# Patient Record
Sex: FEMALE | Race: WHITE | NOT HISPANIC OR LATINO | Employment: OTHER | URBAN - METROPOLITAN AREA
[De-identification: names, ages, dates, MRNs, and addresses within clinical notes are randomized per-mention and may not be internally consistent; named-entity substitution may affect disease eponyms.]

---

## 2017-03-06 ENCOUNTER — ALLSCRIPTS OFFICE VISIT (OUTPATIENT)
Dept: OTHER | Facility: OTHER | Age: 82
End: 2017-03-06

## 2017-03-06 DIAGNOSIS — E03.9 HYPOTHYROIDISM: ICD-10-CM

## 2017-03-06 DIAGNOSIS — R23.1 PALLOR: ICD-10-CM

## 2017-08-07 DIAGNOSIS — E03.9 HYPOTHYROIDISM: ICD-10-CM

## 2017-09-11 ENCOUNTER — ALLSCRIPTS OFFICE VISIT (OUTPATIENT)
Dept: OTHER | Facility: OTHER | Age: 82
End: 2017-09-11

## 2017-10-13 ENCOUNTER — APPOINTMENT (EMERGENCY)
Dept: CT IMAGING | Facility: HOSPITAL | Age: 82
DRG: 305 | End: 2017-10-13
Payer: MEDICARE

## 2017-10-13 ENCOUNTER — OFFICE VISIT (OUTPATIENT)
Dept: URGENT CARE | Facility: MEDICAL CENTER | Age: 82
End: 2017-10-13
Payer: MEDICARE

## 2017-10-13 ENCOUNTER — APPOINTMENT (EMERGENCY)
Dept: RADIOLOGY | Facility: HOSPITAL | Age: 82
DRG: 305 | End: 2017-10-13
Payer: MEDICARE

## 2017-10-13 ENCOUNTER — HOSPITAL ENCOUNTER (INPATIENT)
Facility: HOSPITAL | Age: 82
LOS: 7 days | Discharge: HOME | DRG: 305 | End: 2017-10-20
Attending: EMERGENCY MEDICINE | Admitting: FAMILY MEDICINE
Payer: MEDICARE

## 2017-10-13 DIAGNOSIS — I16.0 HYPERTENSIVE URGENCY: ICD-10-CM

## 2017-10-13 DIAGNOSIS — I48.91 ATRIAL FIBRILLATION (HCC): ICD-10-CM

## 2017-10-13 DIAGNOSIS — E03.9 HYPOTHYROIDISM: ICD-10-CM

## 2017-10-13 DIAGNOSIS — R55 SYNCOPE: ICD-10-CM

## 2017-10-13 DIAGNOSIS — R51.9 HEADACHE: ICD-10-CM

## 2017-10-13 DIAGNOSIS — R53.1 GENERALIZED WEAKNESS: Primary | ICD-10-CM

## 2017-10-13 LAB
ALBUMIN SERPL BCP-MCNC: 4 G/DL (ref 3.5–5)
ALP SERPL-CCNC: 75 U/L (ref 46–116)
ALT SERPL W P-5'-P-CCNC: 30 U/L (ref 12–78)
ANION GAP SERPL CALCULATED.3IONS-SCNC: 7 MMOL/L (ref 4–13)
APTT PPP: 26 SECONDS (ref 23–35)
AST SERPL W P-5'-P-CCNC: 23 U/L (ref 5–45)
BASOPHILS # BLD AUTO: 0.01 THOUSANDS/ΜL (ref 0–0.1)
BASOPHILS NFR BLD AUTO: 0 % (ref 0–1)
BILIRUB SERPL-MCNC: 0.44 MG/DL (ref 0.2–1)
BILIRUB UR QL STRIP: NEGATIVE
BUN SERPL-MCNC: 16 MG/DL (ref 5–25)
CALCIUM SERPL-MCNC: 9.8 MG/DL (ref 8.3–10.1)
CHLORIDE SERPL-SCNC: 99 MMOL/L (ref 100–108)
CLARITY UR: CLEAR
CO2 SERPL-SCNC: 31 MMOL/L (ref 21–32)
COLOR UR: YELLOW
CREAT SERPL-MCNC: 0.78 MG/DL (ref 0.6–1.3)
EOSINOPHIL # BLD AUTO: 0.05 THOUSAND/ΜL (ref 0–0.61)
EOSINOPHIL NFR BLD AUTO: 1 % (ref 0–6)
ERYTHROCYTE [DISTWIDTH] IN BLOOD BY AUTOMATED COUNT: 14 % (ref 11.6–15.1)
GFR SERPL CREATININE-BSD FRML MDRD: 69 ML/MIN/1.73SQ M
GLUCOSE SERPL-MCNC: 97 MG/DL (ref 65–140)
GLUCOSE UR STRIP-MCNC: NEGATIVE MG/DL
HCT VFR BLD AUTO: 40.1 % (ref 34.8–46.1)
HGB BLD-MCNC: 13.7 G/DL (ref 11.5–15.4)
HGB UR QL STRIP.AUTO: NEGATIVE
INR PPP: 0.87 (ref 0.86–1.16)
KETONES UR STRIP-MCNC: ABNORMAL MG/DL
LEUKOCYTE ESTERASE UR QL STRIP: NEGATIVE
LYMPHOCYTES # BLD AUTO: 1.36 THOUSANDS/ΜL (ref 0.6–4.47)
LYMPHOCYTES NFR BLD AUTO: 20 % (ref 14–44)
MCH RBC QN AUTO: 31.5 PG (ref 26.8–34.3)
MCHC RBC AUTO-ENTMCNC: 34.2 G/DL (ref 31.4–37.4)
MCV RBC AUTO: 92 FL (ref 82–98)
MONOCYTES # BLD AUTO: 0.57 THOUSAND/ΜL (ref 0.17–1.22)
MONOCYTES NFR BLD AUTO: 8 % (ref 4–12)
NEUTROPHILS # BLD AUTO: 4.81 THOUSANDS/ΜL (ref 1.85–7.62)
NEUTS SEG NFR BLD AUTO: 71 % (ref 43–75)
NITRITE UR QL STRIP: NEGATIVE
NRBC BLD AUTO-RTO: 0 /100 WBCS
PH UR STRIP.AUTO: 7 [PH] (ref 4.5–8)
PLATELET # BLD AUTO: 194 THOUSANDS/UL (ref 149–390)
PMV BLD AUTO: 9.7 FL (ref 8.9–12.7)
POTASSIUM SERPL-SCNC: 3.9 MMOL/L (ref 3.5–5.3)
PROT SERPL-MCNC: 7.7 G/DL (ref 6.4–8.2)
PROT UR STRIP-MCNC: NEGATIVE MG/DL
PROTHROMBIN TIME: 11.8 SECONDS (ref 12.1–14.4)
RBC # BLD AUTO: 4.35 MILLION/UL (ref 3.81–5.12)
SODIUM SERPL-SCNC: 137 MMOL/L (ref 136–145)
SP GR UR STRIP.AUTO: 1.01 (ref 1–1.03)
SPECIMEN SOURCE: NORMAL
TROPONIN I BLD-MCNC: 0 NG/ML (ref 0–0.08)
TSH SERPL DL<=0.05 MIU/L-ACNC: 8.07 UIU/ML (ref 0.36–3.74)
UROBILINOGEN UR QL STRIP.AUTO: 0.2 E.U./DL
WBC # BLD AUTO: 6.8 THOUSAND/UL (ref 4.31–10.16)

## 2017-10-13 PROCEDURE — 36415 COLL VENOUS BLD VENIPUNCTURE: CPT | Performed by: EMERGENCY MEDICINE

## 2017-10-13 PROCEDURE — 85610 PROTHROMBIN TIME: CPT | Performed by: EMERGENCY MEDICINE

## 2017-10-13 PROCEDURE — 70450 CT HEAD/BRAIN W/O DYE: CPT

## 2017-10-13 PROCEDURE — 71020 HB CHEST X-RAY 2VW FRONTAL&LATL: CPT

## 2017-10-13 PROCEDURE — 96361 HYDRATE IV INFUSION ADD-ON: CPT

## 2017-10-13 PROCEDURE — 84484 ASSAY OF TROPONIN QUANT: CPT

## 2017-10-13 PROCEDURE — 84443 ASSAY THYROID STIM HORMONE: CPT | Performed by: EMERGENCY MEDICINE

## 2017-10-13 PROCEDURE — G0463 HOSPITAL OUTPT CLINIC VISIT: HCPCS

## 2017-10-13 PROCEDURE — 99202 OFFICE O/P NEW SF 15 MIN: CPT

## 2017-10-13 PROCEDURE — 80053 COMPREHEN METABOLIC PANEL: CPT | Performed by: EMERGENCY MEDICINE

## 2017-10-13 PROCEDURE — 99285 EMERGENCY DEPT VISIT HI MDM: CPT

## 2017-10-13 PROCEDURE — 85025 COMPLETE CBC W/AUTO DIFF WBC: CPT | Performed by: EMERGENCY MEDICINE

## 2017-10-13 PROCEDURE — 96374 THER/PROPH/DIAG INJ IV PUSH: CPT

## 2017-10-13 PROCEDURE — 81003 URINALYSIS AUTO W/O SCOPE: CPT

## 2017-10-13 PROCEDURE — 81002 URINALYSIS NONAUTO W/O SCOPE: CPT | Performed by: EMERGENCY MEDICINE

## 2017-10-13 PROCEDURE — 85730 THROMBOPLASTIN TIME PARTIAL: CPT | Performed by: EMERGENCY MEDICINE

## 2017-10-13 PROCEDURE — 93005 ELECTROCARDIOGRAM TRACING: CPT

## 2017-10-13 RX ORDER — ALPRAZOLAM 0.25 MG/1
0.25 TABLET ORAL ONCE
Status: DISCONTINUED | OUTPATIENT
Start: 2017-10-13 | End: 2017-10-13

## 2017-10-13 RX ORDER — OLMESARTAN MEDOXOMIL 40 MG/1
40 TABLET ORAL DAILY
COMMUNITY
End: 2017-10-20 | Stop reason: HOSPADM

## 2017-10-13 RX ORDER — ALPRAZOLAM 0.25 MG/1
0.12 TABLET ORAL ONCE
Status: COMPLETED | OUTPATIENT
Start: 2017-10-13 | End: 2017-10-13

## 2017-10-13 RX ORDER — SERTRALINE HYDROCHLORIDE 25 MG/1
25 TABLET, FILM COATED ORAL DAILY
COMMUNITY
End: 2018-06-20 | Stop reason: SDUPTHER

## 2017-10-13 RX ORDER — LEVOTHYROXINE SODIUM 0.03 MG/1
25 TABLET ORAL DAILY
COMMUNITY
End: 2017-10-20 | Stop reason: HOSPADM

## 2017-10-13 RX ORDER — LABETALOL HYDROCHLORIDE 5 MG/ML
10 INJECTION, SOLUTION INTRAVENOUS ONCE
Status: COMPLETED | OUTPATIENT
Start: 2017-10-13 | End: 2017-10-13

## 2017-10-13 RX ORDER — AMLODIPINE BESYLATE AND BENAZEPRIL HYDROCHLORIDE 5; 20 MG/1; MG/1
1 CAPSULE ORAL DAILY
COMMUNITY
End: 2017-10-20 | Stop reason: HOSPADM

## 2017-10-13 RX ORDER — ALPRAZOLAM 0.25 MG/1
0.25 TABLET ORAL AS NEEDED
COMMUNITY
End: 2018-05-17 | Stop reason: ALTCHOICE

## 2017-10-13 RX ADMIN — LABETALOL 20 MG/4 ML (5 MG/ML) INTRAVENOUS SYRINGE 10 MG: at 19:11

## 2017-10-13 RX ADMIN — ALPRAZOLAM 0.12 MG: 0.25 TABLET ORAL at 21:58

## 2017-10-13 RX ADMIN — LABETALOL 20 MG/4 ML (5 MG/ML) INTRAVENOUS SYRINGE 10 MG: at 18:43

## 2017-10-13 RX ADMIN — SODIUM CHLORIDE 1000 ML: 0.9 INJECTION, SOLUTION INTRAVENOUS at 16:55

## 2017-10-13 NOTE — ED NOTES
Patient transported to [address] [place] [address] [facility]  10/13/17 7375

## 2017-10-13 NOTE — ED NOTES
Physician notified of patient BP, Orders received        [address], RN  10/13/17 5996

## 2017-10-13 NOTE — ED PROVIDER NOTES
History  Chief Complaint   Patient presents with    Weakness - Generalized     Pt reports generalized weakness, fatigue and headache off and on since wednesday  pt reports had a syncopal episode wednesday but denies hitting head or LOC    Headache     Pt  Had a syncopal episode 2 days ago, witnessed by her   +LOC for few seconds  Pt  Lives in independent living -walks with a walker  Then yesterday pt  Called the nurse at the facility that she lives at and was checked out , her vitals were fine   +generalized weakness since 2 days ago  +headaches (pressure), comes and goes for 2 days  No fevers, pt  Has some post nasal drip, no cough, no n/v/d  Pt  Is eating and drinking okay the past 2 days  Pt  Was at urgent care and then sent here  Her bp was normal at urgent care but elevated to 221/84 here now in the ER  She is taking her bp meds regularly  Doesn't take blood thinners  Prior to Admission Medications   Prescriptions Last Dose Informant Patient Reported? Taking? ALPRAZolam (XANAX) 0 25 mg tablet   Yes Yes   Sig: Take 0 25 mg by mouth as needed for anxiety   amLODIPine-benazepril (LOTREL 5-20) 5-20 MG per capsule   Yes Yes   Sig: Take 1 capsule by mouth daily   levothyroxine 25 mcg tablet   Yes Yes   Sig: Take 25 mcg by mouth daily   olmesartan (BENICAR) 40 mg tablet   Yes Yes   Sig: Take 40 mg by mouth daily   sertraline (ZOLOFT) 25 mg tablet   Yes Yes   Sig: Take 25 mg by mouth daily      Facility-Administered Medications: None       Past Medical History:   Diagnosis Date    Hypertension        History reviewed  No pertinent surgical history  History reviewed  No pertinent family history  I have reviewed and agree with the history as documented  Social History   Substance Use Topics    Smoking status: Former Smoker    Smokeless tobacco: Never Used    Alcohol use Yes        Review of Systems   Constitutional: Negative for appetite change, fatigue and fever     HENT: Negative for rhinorrhea and sore throat  Respiratory: Negative for cough, shortness of breath and wheezing  Cardiovascular: Negative for chest pain and leg swelling  Gastrointestinal: Negative for abdominal pain, diarrhea, nausea and vomiting  Genitourinary: Negative for dysuria and flank pain  Musculoskeletal: Negative for back pain and neck pain  Skin: Negative for rash  Neurological: Positive for dizziness, syncope, weakness, light-headedness and headaches  Psychiatric/Behavioral:        Mood normal       Physical Exam  ED Triage Vitals [10/13/17 1615]   Temperature Pulse Respirations Blood Pressure SpO2   97 8 °F (36 6 °C) 80 18 (!) 221/84 100 %      Temp Source Heart Rate Source Patient Position - Orthostatic VS BP Location FiO2 (%)   Oral Monitor Lying Right arm --      Pain Score       5           Physical Exam   Constitutional: She is oriented to person, place, and time  She appears well-developed and well-nourished  HENT:   Head: Normocephalic and atraumatic  Mouth/Throat: Oropharynx is clear and moist    Eyes: Pupils are equal, round, and reactive to light  Neck: Normal range of motion  Neck supple  Cardiovascular: Normal rate and regular rhythm  Pulmonary/Chest: Effort normal and breath sounds normal    Abdominal: Soft  There is no tenderness  Musculoskeletal: Normal range of motion  All ext  FROM, nontender   Neurological: She is alert and oriented to person, place, and time  Skin: Skin is warm and dry  Nursing note and vitals reviewed        ED Medications  Medications   sodium chloride 0 9 % bolus 1,000 mL (0 mL Intravenous Stopped 10/13/17 1831)   labetalol (NORMODYNE) injection 10 mg (10 mg Intravenous Given 10/13/17 1843)   labetalol (NORMODYNE) injection 10 mg (10 mg Intravenous Given 10/13/17 1911)   ALPRAZolam (XANAX) tablet 0 125 mg (0 125 mg Oral Given 10/13/17 2158)       Diagnostic Studies  Labs Reviewed   PROTIME-INR - Abnormal        Result Value Ref Range Status    Protime 11 8 (*) 12 1 - 14 4 seconds Final    INR 0 87  0 86 - 1 16 Final   COMPREHENSIVE METABOLIC PANEL - Abnormal     Chloride 99 (*) 100 - 108 mmol/L Final    Sodium 137  136 - 145 mmol/L Final    Potassium 3 9  3 5 - 5 3 mmol/L Final    CO2 31  21 - 32 mmol/L Final    Anion Gap 7  4 - 13 mmol/L Final    BUN 16  5 - 25 mg/dL Final    Creatinine 0 78  0 60 - 1 30 mg/dL Final    Comment: Standardized to IDMS reference method    Glucose 97  65 - 140 mg/dL Final    Comment:   If the patient is fasting, the ADA then defines impaired fasting glucose as > 100 mg/dL and diabetes as > or equal to 123 mg/dL  Specimen collection should occur prior to Sulfasalazine administration due to the potential for falsely depressed results  Specimen collection should occur prior to Sulfapyridine administration due to the potential for falsely elevated results  Calcium 9 8  8 3 - 10 1 mg/dL Final    AST 23  5 - 45 U/L Final    Comment:   Specimen collection should occur prior to Sulfasalazine administration due to the potential for falsely depressed results  ALT 30  12 - 78 U/L Final    Comment:   Specimen collection should occur prior to Sulfasalazine administration due to the potential for falsely depressed results  Alkaline Phosphatase 75  46 - 116 U/L Final    Total Protein 7 7  6 4 - 8 2 g/dL Final    Albumin 4 0  3 5 - 5 0 g/dL Final    Total Bilirubin 0 44  0 20 - 1 00 mg/dL Final    eGFR 69  ml/min/1 73sq m Final    Narrative:     National Kidney Disease Education Program recommendations are as follows:  GFR calculation is accurate only with a steady state creatinine  Chronic Kidney disease less than 60 ml/min/1 73 sq  meters  Kidney failure less than 15 ml/min/1 73 sq  meters     TSH, 3RD GENERATION - Abnormal     [facility] (*) 0 358 - 3 740 uIU/mL Final    Narrative:     Patients undergoing fluorescein dye angiography may retain small amounts of fluorescein in the body for 48-72 hours post procedure  Samples containing fluorescein can produce falsely depressed TSH values  If the patient had this procedure,a specimen should be resubmitted post fluorescein clearance            The recommended reference ranges for TSH during pregnancy are as follows:  First trimester 0 1 to 2 5 uIU/mL  Second trimester  0 2 to 3 0 uIU/mL  Third trimester 0 3 to 3 0 uIU/m     POCT URINALYSIS DIPSTICK - Abnormal    ED URINE MACROSCOPIC - Abnormal     Ketones, UA Trace (*) Negative mg/dl Final    Color, UA Yellow   Final    Clarity, UA Clear   Final    pH, UA 7 0  4 5 - 8 0 Final    Leukocytes, UA Negative  Negative Final    Nitrite, UA Negative  Negative Final    Protein, UA Negative  Negative mg/dl Final    Glucose, UA Negative  Negative mg/dl Final    Urobilinogen, UA 0 2  0 2, 1 0 E U /dl E U /dl Final    Bilirubin, UA Negative  Negative Final    Blood, UA Negative  Negative Final    Specific [place], UA 1 015  1 003 - 1 030 Final    Narrative:     CLINITEK RESULT   CBC AND DIFFERENTIAL - Normal    WBC 6 80  4 31 - 10 16 Thousand/uL Final    RBC 4 35  3 81 - 5 12 Million/uL Final    Hemoglobin 13 7  11 5 - 15 4 g/dL Final    Hematocrit 40 1  34 8 - 46 1 % Final    MCV 92  82 - 98 fL Final    MCH 31 5  26 8 - 34 3 pg Final    MCHC 34 2  31 4 - 37 4 g/dL Final    RDW 14 0  11 6 - 15 1 % Final    MPV 9 7  8 9 - 12 7 fL Final    Platelets 517  598 - 390 Thousands/uL Final    nRBC 0  /100 WBCs Final    Neutrophils Relative 71  43 - 75 % Final    Lymphocytes Relative 20  14 - 44 % Final    Monocytes Relative 8  4 - 12 % Final    Eosinophils Relative 1  0 - 6 % Final    Basophils Relative 0  0 - 1 % Final    Neutrophils Absolute 4 81  1 85 - 7 62 Thousands/µL Final    Lymphocytes Absolute 1 36  0 60 - 4 47 Thousands/µL Final    Monocytes Absolute 0 57  0 17 - 1 22 Thousand/µL Final    Eosinophils Absolute 0 05  0 00 - 0 61 Thousand/µL Final    Basophils Absolute 0 01  0 00 - 0 10 Thousands/µL Final   APTT - Normal    PTT 26  23 - 35 seconds Final    Narrative: Therapeutic Heparin Range = 60-90 seconds   POCT TROPONIN - Normal    POC Troponin I 0 00  0 00 - 0 08 ng/ml Final    Specimen Type VENOUS   Final    Narrative:     [employer] i-Stat handheld analyzer 99% cutoff is > 0 08ng/mL in [facility] Emergency Departments    o cTnI 99% cutoff is useful only when applied to patients in the clinical setting of myocardial ischemia  o cTnI 99% cutoff should be interpreted in the context of clinical history, ECG findings and possibly cardiac imaging to establish correct diagnosis  o cTnI 99% cutoff may be suggestive but clearly not indicative of a coronary event without the clinical setting of myocardial ischemia  CT head without contrast   Final Result   1  No acute intracranial abnormality  2  Hyperdensity in the right basal ganglia representing a focus of calcification  Workstation performed: [id number]         XR chest 2 views   Final Result      No active pulmonary disease  Workstation performed: [id number]         CT head without contrast   Final Result   1  Tiny 1 mm hyperdensity identified in the right basal ganglia likely represents focus of calcification and not acute hemorrhage  I have no priors for comparison  Follow-up noncontrast head CT is recommended in 3-4 hours to assure stability  No other    areas of concern for acute intracranial abnormality  2  Volume loss/atrophy and white matter microangiopathy           Workstation performed: [id number]             Procedures  ECG 12 Lead Documentation  Date/Time: 10/13/2017 4:29 PM  Performed by: DOMENICA Whelan  Authorized by: DOMENICA Whelan     Rate:     ECG rate:  68    ECG rate assessment: normal    Rhythm:     Rhythm: sinus rhythm    Comments:      No st elevation or depression          Phone Contacts  ED Phone Contact    ED Course  ED Course                                MDM  Number of Diagnoses or Management Options  Generalized weakness:   Headache:   Hypertensive urgency:   Syncope:      Amount and/or Complexity of Data Reviewed  Clinical lab tests: ordered and reviewed  Tests in the radiology section of CPT®: ordered and reviewed    Risk of Complications, Morbidity, and/or Mortality  Presenting problems: moderate  General comments: Pt  Admitted for further work up      Adilene Lund Time    Disposition  Final diagnoses:   Generalized weakness   Syncope   Headache   Hypertensive urgency     ED Disposition     ED Disposition Condition Comment    Admit  Case was discussed with CHRISSY  and the patient's admission status was agreed to be inpt /tele      Follow-up Information    None       Current Discharge Medication List      CONTINUE these medications which have NOT CHANGED    Details   ALPRAZolam (XANAX) 0 25 mg tablet Take 0 25 mg by mouth as needed for anxiety      amLODIPine-benazepril (LOTREL 5-20) 5-20 MG per capsule Take 1 capsule by mouth daily      levothyroxine 25 mcg tablet Take 25 mcg by mouth daily      olmesartan (BENICAR) 40 mg tablet Take 40 mg by mouth daily      sertraline (ZOLOFT) 25 mg tablet Take 25 mg by mouth daily           No discharge procedures on file      ED Provider  Electronically Signed by       Collins Gannon MD  10/14/17 0674

## 2017-10-13 NOTE — ED NOTES
Patient ambulatory to restroom with cane  Steady coordinated gait          [address], RN  10/13/17 1491

## 2017-10-14 PROBLEM — I16.0 HYPERTENSIVE URGENCY: Status: ACTIVE | Noted: 2017-10-14

## 2017-10-14 PROBLEM — R53.1 WEAKNESS: Status: ACTIVE | Noted: 2017-10-14

## 2017-10-14 PROBLEM — E78.5 HLD (HYPERLIPIDEMIA): Status: ACTIVE | Noted: 2017-10-14

## 2017-10-14 PROBLEM — E03.9 HYPOTHYROIDISM: Status: ACTIVE | Noted: 2017-10-14

## 2017-10-14 PROBLEM — R51.9 HEADACHE: Status: ACTIVE | Noted: 2017-10-14

## 2017-10-14 PROBLEM — R53.1 GENERALIZED WEAKNESS: Status: ACTIVE | Noted: 2017-10-14

## 2017-10-14 PROBLEM — R55 SYNCOPE: Status: ACTIVE | Noted: 2017-10-14

## 2017-10-14 LAB
ANION GAP SERPL CALCULATED.3IONS-SCNC: 7 MMOL/L (ref 4–13)
BUN SERPL-MCNC: 11 MG/DL (ref 5–25)
CALCIUM SERPL-MCNC: 9 MG/DL (ref 8.3–10.1)
CHLORIDE SERPL-SCNC: 104 MMOL/L (ref 100–108)
CO2 SERPL-SCNC: 27 MMOL/L (ref 21–32)
CREAT SERPL-MCNC: 0.71 MG/DL (ref 0.6–1.3)
ERYTHROCYTE [DISTWIDTH] IN BLOOD BY AUTOMATED COUNT: 14.1 % (ref 11.6–15.1)
GFR SERPL CREATININE-BSD FRML MDRD: 77 ML/MIN/1.73SQ M
GLUCOSE SERPL-MCNC: 83 MG/DL (ref 65–140)
HCT VFR BLD AUTO: 36 % (ref 34.8–46.1)
HGB BLD-MCNC: 12.2 G/DL (ref 11.5–15.4)
MCH RBC QN AUTO: 31.4 PG (ref 26.8–34.3)
MCHC RBC AUTO-ENTMCNC: 33.9 G/DL (ref 31.4–37.4)
MCV RBC AUTO: 93 FL (ref 82–98)
PLATELET # BLD AUTO: 172 THOUSANDS/UL (ref 149–390)
PMV BLD AUTO: 9.6 FL (ref 8.9–12.7)
POTASSIUM SERPL-SCNC: 3.4 MMOL/L (ref 3.5–5.3)
RBC # BLD AUTO: 3.88 MILLION/UL (ref 3.81–5.12)
SODIUM SERPL-SCNC: 138 MMOL/L (ref 136–145)
T4 FREE SERPL-MCNC: 0.86 NG/DL (ref 0.76–1.46)
WBC # BLD AUTO: 5.05 THOUSAND/UL (ref 4.31–10.16)

## 2017-10-14 PROCEDURE — 84439 ASSAY OF FREE THYROXINE: CPT | Performed by: PHYSICIAN ASSISTANT

## 2017-10-14 PROCEDURE — 80048 BASIC METABOLIC PNL TOTAL CA: CPT | Performed by: FAMILY MEDICINE

## 2017-10-14 PROCEDURE — 85027 COMPLETE CBC AUTOMATED: CPT | Performed by: FAMILY MEDICINE

## 2017-10-14 RX ORDER — SERTRALINE HYDROCHLORIDE 25 MG/1
25 TABLET, FILM COATED ORAL DAILY
Status: DISCONTINUED | OUTPATIENT
Start: 2017-10-14 | End: 2017-10-20 | Stop reason: HOSPADM

## 2017-10-14 RX ORDER — LEVOTHYROXINE SODIUM 0.07 MG/1
37.5 TABLET ORAL
Status: DISCONTINUED | OUTPATIENT
Start: 2017-10-15 | End: 2017-10-20 | Stop reason: HOSPADM

## 2017-10-14 RX ORDER — POTASSIUM CHLORIDE 20 MEQ/1
20 TABLET, EXTENDED RELEASE ORAL ONCE
Status: COMPLETED | OUTPATIENT
Start: 2017-10-14 | End: 2017-10-14

## 2017-10-14 RX ORDER — OLMESARTAN MEDOXOMIL 20 MG/1
40 TABLET ORAL DAILY
Status: DISCONTINUED | OUTPATIENT
Start: 2017-10-14 | End: 2017-10-14

## 2017-10-14 RX ORDER — ALPRAZOLAM 0.25 MG/1
0.25 TABLET ORAL 3 TIMES DAILY PRN
Status: DISCONTINUED | OUTPATIENT
Start: 2017-10-14 | End: 2017-10-20 | Stop reason: HOSPADM

## 2017-10-14 RX ORDER — AMLODIPINE BESYLATE 5 MG/1
5 TABLET ORAL DAILY
Status: DISCONTINUED | OUTPATIENT
Start: 2017-10-14 | End: 2017-10-20 | Stop reason: HOSPADM

## 2017-10-14 RX ORDER — ONDANSETRON 2 MG/ML
4 INJECTION INTRAMUSCULAR; INTRAVENOUS EVERY 6 HOURS PRN
Status: DISCONTINUED | OUTPATIENT
Start: 2017-10-14 | End: 2017-10-17

## 2017-10-14 RX ORDER — HEPARIN SODIUM 5000 [USP'U]/ML
5000 INJECTION, SOLUTION INTRAVENOUS; SUBCUTANEOUS EVERY 8 HOURS SCHEDULED
Status: DISCONTINUED | OUTPATIENT
Start: 2017-10-14 | End: 2017-10-20 | Stop reason: HOSPADM

## 2017-10-14 RX ORDER — SODIUM CHLORIDE 9 MG/ML
75 INJECTION, SOLUTION INTRAVENOUS CONTINUOUS
Status: DISCONTINUED | OUTPATIENT
Start: 2017-10-14 | End: 2017-10-15

## 2017-10-14 RX ORDER — FLUTICASONE PROPIONATE 50 MCG
1 SPRAY, SUSPENSION (ML) NASAL DAILY
Status: DISCONTINUED | OUTPATIENT
Start: 2017-10-14 | End: 2017-10-20 | Stop reason: HOSPADM

## 2017-10-14 RX ORDER — OLMESARTAN MEDOXOMIL 20 MG/1
20 TABLET ORAL DAILY
Status: DISCONTINUED | OUTPATIENT
Start: 2017-10-15 | End: 2017-10-16

## 2017-10-14 RX ORDER — HYDRALAZINE HYDROCHLORIDE 20 MG/ML
5 INJECTION INTRAMUSCULAR; INTRAVENOUS EVERY 6 HOURS PRN
Status: DISCONTINUED | OUTPATIENT
Start: 2017-10-14 | End: 2017-10-20 | Stop reason: HOSPADM

## 2017-10-14 RX ORDER — LEVOTHYROXINE SODIUM 0.03 MG/1
25 TABLET ORAL
Status: DISCONTINUED | OUTPATIENT
Start: 2017-10-14 | End: 2017-10-14

## 2017-10-14 RX ORDER — ACETAMINOPHEN 325 MG/1
650 TABLET ORAL EVERY 6 HOURS PRN
Status: DISCONTINUED | OUTPATIENT
Start: 2017-10-14 | End: 2017-10-20 | Stop reason: HOSPADM

## 2017-10-14 RX ORDER — HEPARIN SODIUM 5000 [USP'U]/ML
5000 INJECTION, SOLUTION INTRAVENOUS; SUBCUTANEOUS EVERY 8 HOURS SCHEDULED
Status: DISCONTINUED | OUTPATIENT
Start: 2017-10-14 | End: 2017-10-14

## 2017-10-14 RX ORDER — ALPRAZOLAM 0.25 MG/1
0.25 TABLET ORAL AS NEEDED
Status: DISCONTINUED | OUTPATIENT
Start: 2017-10-14 | End: 2017-10-14 | Stop reason: CLARIF

## 2017-10-14 RX ADMIN — SODIUM CHLORIDE 125 ML/HR: 0.9 INJECTION, SOLUTION INTRAVENOUS at 08:29

## 2017-10-14 RX ADMIN — LEVOTHYROXINE SODIUM 25 MCG: 25 TABLET ORAL at 05:52

## 2017-10-14 RX ADMIN — SODIUM CHLORIDE 75 ML/HR: 0.9 INJECTION, SOLUTION INTRAVENOUS at 23:54

## 2017-10-14 RX ADMIN — ACETAMINOPHEN 650 MG: 325 TABLET, FILM COATED ORAL at 14:08

## 2017-10-14 RX ADMIN — BENAZEPRIL HYDROCHLORIDE: 10 TABLET ORAL at 08:20

## 2017-10-14 RX ADMIN — SERTRALINE HYDROCHLORIDE 25 MG: 25 TABLET ORAL at 08:20

## 2017-10-14 RX ADMIN — ACETAMINOPHEN 650 MG: 325 TABLET, FILM COATED ORAL at 23:52

## 2017-10-14 RX ADMIN — HEPARIN SODIUM 5000 UNITS: 5000 INJECTION, SOLUTION INTRAVENOUS; SUBCUTANEOUS at 05:52

## 2017-10-14 RX ADMIN — SODIUM CHLORIDE 75 ML/HR: 0.9 INJECTION, SOLUTION INTRAVENOUS at 16:09

## 2017-10-14 RX ADMIN — OLMESARTAN MEDOXOMIL 40 MG: 20 TABLET, FILM COATED ORAL at 08:20

## 2017-10-14 RX ADMIN — HEPARIN SODIUM 5000 UNITS: 5000 INJECTION, SOLUTION INTRAVENOUS; SUBCUTANEOUS at 21:45

## 2017-10-14 RX ADMIN — HEPARIN SODIUM 5000 UNITS: 5000 INJECTION, SOLUTION INTRAVENOUS; SUBCUTANEOUS at 01:05

## 2017-10-14 RX ADMIN — POTASSIUM CHLORIDE 20 MEQ: 1500 TABLET, EXTENDED RELEASE ORAL at 08:20

## 2017-10-14 RX ADMIN — SODIUM CHLORIDE 125 ML/HR: 0.9 INJECTION, SOLUTION INTRAVENOUS at 01:04

## 2017-10-14 RX ADMIN — FLUTICASONE PROPIONATE 1 SPRAY: 50 SPRAY, METERED NASAL at 12:05

## 2017-10-14 RX ADMIN — HEPARIN SODIUM 5000 UNITS: 5000 INJECTION, SOLUTION INTRAVENOUS; SUBCUTANEOUS at 14:08

## 2017-10-14 NOTE — H&P
History and Physical - [facility] Internal Medicine    Patient Information: Tony Flood 80 y o  female MRN: [id number]  Unit/Bed#: E4 -01 Encounter: [id number]  Admitting Physician: Cliff Small MD  PCP: Shawn Reina DO  Date of Admission:  10/14/17    Assessment/Plan:    Hospital Problem List:     Principal Problem:    Syncope  Active Problems:    Generalized weakness    Headache    Hypertensive urgency    Weakness    Syncope/Head pressure  CT of hypertensive crisis head was unremarkable for any acute abnormality  Will get orthostatic vitals  Fall precaution  Under carotid Doppler and 2D echocardiogram for further workup of syncope     Accelerated hypertension  Blood pressure improved with IV labetalol  Allow permissive hypertension  Will put holding parameters on Lotrel 5/20 and olmesartan    Depression  Continue home medications    Hypokalemia   Replete    Hypothyroidism  Continue Synthroid    VTE Prophylaxis: Heparin  / sequential compression device   Code Status:  Full code  POLST: POLST form is not discussed and not completed at this time  Anticipated Length of Stay:  Patient will be admitted on an Inpatient basis with an anticipated length of stay of  < 2 midnights  Justification for Hospital Stay:  Syncope workup, blood pressure control and monitor    Total Time for Visit, including Counseling / Coordination of Care: 45 minutes  Greater than 50% of this total time spent on direct patient counseling and coordination of care  Chief Complaint:   Syncope, generalized weakness, head pressure    History of Present Illness:    Tony Flood is a 80 y o  female who presents with syncope  Patient resides at an independent living facility  Patient states the symptoms started 4 days ago when she became suddenly very weak and dizzy and passed out for for few seconds  This was witnessed by her     She does get a visiting nurse who checked her vital signs and her vital signs were okay  The next day she had a similar symptom, but this time  she did not pass out but suddenly became very weak  She laid down , and felt randy  Today again she had a similar problem and was advised to come to the urgent care center by her nurse  At the urgent care center , patient was evaluated and her blood pressure was markedly elevated  She was advised to go to the emergency room  She denies any chest pain, no palpitations, no shortness of breath, no fever, no nausea, no vomiting, no urinary symptoms  She ambulates with a walker  She does complain of pressure in the head  This has also been ongoing for a few days and not corresponding to her episodes of weakness  The head pressure is on and off  Review of Systems:    Review of Systems   Constitutional: Positive for fatigue  Cardiovascular: Negative for palpitations  Gastrointestinal: Negative for nausea  Neurological: Positive for dizziness  Head pressure   All other systems reviewed and are negative  Past Medical and Surgical History:     Past Medical History:   Diagnosis Date    Hypertension        History reviewed  No pertinent surgical history  Meds/Allergies:    Prior to Admission medications    Medication Sig Start Date End Date Taking? Authorizing Provider   ALPRAZolam Means María) 0 25 mg tablet Take 0 25 mg by mouth as needed for anxiety   Yes Historical Provider, MD   amLODIPine-benazepril (LOTREL 5-20) 5-20 MG per capsule Take 1 capsule by mouth daily   Yes Historical Provider, MD   levothyroxine 25 mcg tablet Take 25 mcg by mouth daily   Yes Historical Provider, MD   olmesartan (BENICAR) 40 mg tablet Take 40 mg by mouth daily   Yes Historical Provider, MD   sertraline (ZOLOFT) 25 mg tablet Take 25 mg by mouth daily   Yes Historical Provider, MD     I have reviewed home medications with patient personally  Allergies:    Allergies   Allergen Reactions    Penicillins        Social History:     Marital Status: /Civil [employer]   Occupation:   Patient Pre-hospital Living Situation:   Patient Pre-hospital Level of Mobility:   Patient Pre-hospital Diet Restrictions:   Substance Use History:   History   Alcohol Use    Yes     History   Smoking Status    Former Smoker   Smokeless Tobacco    Never Used     History   Drug Use No       Family History:    History reviewed  No pertinent family history  Physical Exam:     Vitals:   Blood Pressure: 142/60 (10/13/17 2343)  Pulse: 70 (10/13/17 2343)  Temperature: 98 7 °F (37 1 °C) (10/13/17 2343)  Temp Source: Oral (10/13/17 1615)  Respirations: 18 (10/13/17 2343)  Height: 5' (152 4 cm) (10/13/17 2343)  Weight - Scale: 45 4 kg (100 lb) (10/13/17 2343)  SpO2: 97 % (10/13/17 2343)    Physical Exam   Constitutional: She is oriented to person, place, and time  She appears well-developed and well-nourished  HENT:   Head: Normocephalic and atraumatic  Eyes: Conjunctivae and EOM are normal  Pupils are equal, round, and reactive to light  Neck: Normal range of motion  Neck supple  Cardiovascular: Normal rate, regular rhythm and normal heart sounds  Pulmonary/Chest: Effort normal and breath sounds normal    Abdominal: Soft  Bowel sounds are normal    Musculoskeletal: Normal range of motion  Neurological: She is alert and oriented to person, place, and time  She has normal reflexes  Skin: Skin is warm and dry  Psychiatric: She has a normal mood and affect  Her behavior is normal  Judgment and thought content normal    Nursing note and vitals reviewed  Additional Data:     Lab Results: I have personally reviewed pertinent reports          Results from last 7 days  Lab Units 10/14/17  0505 10/13/17  1654   WBC Thousand/uL 5 05 6 80   HEMOGLOBIN g/dL 12 2 13 7   HEMATOCRIT % 36 0 40 1   PLATELETS Thousands/uL 172 194   NEUTROS PCT %  --  71   LYMPHS PCT %  --  20   MONOS PCT %  --  8   EOS PCT %  --  1       Results from last 7 days  Lab Units 10/14/17  0505 10/13/17  1654   SODIUM mmol/L 138 137   POTASSIUM mmol/L 3 4* 3 9   CHLORIDE mmol/L 104 99*   CO2 mmol/L 27 31   BUN mg/dL 11 16   CREATININE mg/dL 0 71 0 78   CALCIUM mg/dL 9 0 9 8   TOTAL PROTEIN g/dL  --  7 7   BILIRUBIN TOTAL mg/dL  --  0 44   ALK PHOS U/L  --  75   ALT U/L  --  30   AST U/L  --  23   GLUCOSE RANDOM mg/dL 83 97       Results from last 7 days  Lab Units 10/13/17  1654   INR  0 87       Imaging: I have personally reviewed pertinent reports  Xr Chest 2 Views    Result Date: 10/13/2017  Narrative: CHEST INDICATION:  Syncope COMPARISON:  None VIEWS:  Frontal and lateral projections IMAGES:  2 FINDINGS:     Cardiomediastinal silhouette appears unremarkable  The lungs are clear  Mild chronic structure very changes are present  No pneumothorax or pleural effusion  There is moderate lumbar levoscoliosis  There is mild diffuse bone demineralization  Impression: No active pulmonary disease  Workstation performed: [id number]     Ct Head Without Contrast    Result Date: 10/13/2017  Narrative: CT BRAIN - WITHOUT CONTRAST INDICATION:  Hyperdensity in the right basal ganglia  COMPARISON:  Same date CT  TECHNIQUE:  CT examination of the brain was performed  In addition to axial images, coronal reformatted images were created and submitted for interpretation  Radiation dose length product (DLP) for this visit:  1004 mGy-cm   This examination, like all CT scans performed in the [place], was performed utilizing techniques to minimize radiation dose exposure, including the use of iterative reconstruction and automated exposure control  IMAGE QUALITY:  Diagnostic  FINDINGS:  PARENCHYMA:  No intracranial mass, mass effect or midline shift  No CT signs of acute infarction  There is no parenchymal hemorrhage  Right basal ganglia hyperdensity representing a focus of calcification rather than hemorrhage   VENTRICLES AND EXTRA-AXIAL SPACES:  Enlargement of ventricles and extra-axial CSF spaces consistent with cerebral and cerebellar atrophy  VISUALIZED ORBITS AND PARANASAL SINUSES:  Unchanged  CALVARIUM AND EXTRACRANIAL SOFT TISSUES:   Normal      Impression: 1  No acute intracranial abnormality  2  Hyperdensity in the right basal ganglia representing a focus of calcification  Workstation performed: [id number]     Ct Head Without Contrast    Result Date: 10/13/2017  Narrative: CT BRAIN - WITHOUT CONTRAST INDICATION:  Syncope, head injury COMPARISON:  None  TECHNIQUE:  CT examination of the brain was performed  In addition to axial images, coronal reformatted images were created and submitted for interpretation  Radiation dose length product (DLP) for this visit:  959 mGy-cm   This examination, like all CT scans performed in the [facility], was performed utilizing techniques to minimize radiation dose exposure, including the use of iterative reconstruction and automated exposure control  IMAGE QUALITY:  Diagnostic  FINDINGS:  PARENCHYMA:  Decreased attenuation is noted in the supratentorial white matter demonstrating an appearance most consistent with moderate to marked microangiopathic change  No intracranial mass, mass effect or midline shift  No CT signs of acute infarction  Tiny 1 mm hyperdensity identified in the right basal ganglia likely represents focus of calcification rather than acute hemorrhage  I have no priors for comparison  Follow-up noncontrast head CT is recommended in 3-4 hours  VENTRICLES AND EXTRA-AXIAL SPACES:  Enlargement of ventricles and extra-axial CSF spaces consistent with cerebral and cerebellar atrophy  VISUALIZED ORBITS AND PARANASAL SINUSES:  Unremarkable  CALVARIUM AND EXTRACRANIAL SOFT TISSUES:   Normal      Impression: 1  Tiny 1 mm hyperdensity identified in the right basal ganglia likely represents focus of calcification and not acute hemorrhage  I have no priors for comparison   Follow-up noncontrast head CT is recommended in 3-4 hours to assure stability  No other areas of concern for acute intracranial abnormality  2  Volume loss/atrophy and white matter microangiopathy  Workstation performed: [id number]       EKG, Pathology, and Other Studies Reviewed on Admission:   · EKG:  Normal sinus rhythm at 68 beats per minute    Allscripts/Epic Records Reviewed: Yes     ** Please Note: Dragon 360 Dictation voice to text software may have been used in the creation of this document   **

## 2017-10-14 NOTE — PROGRESS NOTES
[facility] Progress Note  Patient: Jeni Farrar 80 y o  female   MRN: [id number]  PCP: Margot Troy DO  Unit/Bed#: E4 -01 Encounter: [id number]  Date Of Visit: 10/14/17    Assessment:    Principal Problem:    Syncope  Active Problems:    Generalized weakness    Headache    Hypertensive urgency    Weakness    HLD (hyperlipidemia)    Hypothyroidism    Post admission note:  Please note the patient was admitted at 7:24 a m  [facility] Please refer to admission H&P for further details    Plan:    · Syncope:  Patient was noted have a syncopal episode on Wednesday  She felt weak prior to this however had no dizziness is preceding symptoms  Continue to monitor telemetry  No events at this time  Obtain orthostatic vital signs  Obtain echocardiogram and carotid artery ultrasound  Obtain orthostatic vital signs  No focal neurologic deficits on exam  · Head pressure:  Patient without evidence of sinus congestion  · Accelerated hypertension/hypertensive urgency, present on admission:  Status post IV labetalol  Continue amlodipine, benicar  Currently ordered as amlodipine/benazepril  This is not how patient takes it  She is already on an Arb  Patient takes 2 5 mg of Norvasc and 20 mg of Benicar  Will increase Norvasc to 5 mg, continue Benicar 20 mg, continue monitor blood pressure closely with IV hydralazine p r n  · 1 mm hyperdensity and right basal ganglia:  Initial CT scan was noted to have concern for hemorrhage versus calcification  Repeat was noted to show calcification with no evidence of hemorrhage  · Depression:  Continue Zoloft  · Hypokalemia:  Give 20 mEq p o  x1  · Hypothyroidism:  TSH 2 months ago was noted to be 2  Currently 8 069  We will check free T4, increased Synthroid from 25 mcg to 37 5 and check labs as outpatient in 4-6 weeks     · Hyperlipidemia:  Dietary and lifestyle modifications      VTE Pharmacologic Prophylaxis:   Pharmacologic: Heparin  Mechanical VTE Prophylaxis in Place: Yes    Patient Centered Rounds: I have performed bedside rounds with nursing staff today  Discussions with Specialists or Other Care Team Provider:     Education and Discussions with Family / Patient: patient    Time Spent for Care: 45 minutes  More than 50% of total time spent on counseling and coordination of care as described above  Current Length of Stay: 1 day(s)    Current Patient Status: Inpatient   Certification Statement: The patient will continue to require additional inpatient hospital stay due to syncope work up, continue to evaluate syncope etiology    Discharge Plan / Estimated Discharge Date: not stable for discharge- await ECHO, carotids  Monitor BP  Code Status: Level 1 - Full Code      Subjective:   She is having head pressure and feels her entire head has a pressure sensation  No unilateral weakness, no blurred vision or dizziness  No chest pain or SOB  On Wednesday was noted to have syncopal episode and was noted to be standing and walking cleaning up the bedroom and getting ready for the day and making the bed when she was noted to feel suddenly weak  She then was noted to called her  and was noted to lay on the bed and then had a syncopal episode for a few seconds when she awoke, she was not postictal   There was no seizure-like activity  There was no urinary incontinence  Since that time she has felt continually weak and has had head pressure  No med changes since december    Objective:     Vitals:   Temp (24hrs), Av °F (36 7 °C), Min:97 4 °F (36 3 °C), Max:98 7 °F (37 1 °C)    HR:  [60-84] 69  Resp:  [16-18] 18  BP: (142-221)/() 170/83  SpO2:  [97 %-100 %] 100 %  Body mass index is 19 53 kg/m²  Input and Output Summary (last 24 hours):        Intake/Output Summary (Last 24 hours) at 10/14/17 0989  Last data filed at 10/14/17 0623   Gross per 24 hour   Intake             1000 ml   Output              550 ml   Net              450 ml       Physical Exam:     Physical Exam   Constitutional: She is oriented to person, place, and time  No distress  HENT:   Head: Normocephalic and atraumatic  Eyes: Conjunctivae are normal    Neck: No JVD present  Cardiovascular: Normal rate, regular rhythm, normal heart sounds and intact distal pulses  No murmur heard  Pulmonary/Chest: Effort normal and breath sounds normal  No respiratory distress  She has no wheezes  She has no rales  Abdominal: Soft  Bowel sounds are normal  She exhibits no distension  There is no tenderness  There is no rebound and no guarding  Musculoskeletal: She exhibits no edema  Neurological: She is alert and oriented to person, place, and time  No cranial nerve deficit  Awake, alert, oriented to person place and time  Follows commands  Cranial nerves 2-12 intact  No evidence of nystagmus, strabismus, lid lag  Extraocular movements intact  Muscle strength 5/5 throughout  Skin: Skin is warm and dry  No rash noted  She is not diaphoretic  No erythema  Psychiatric: She has a normal mood and affect  Her behavior is normal    Nursing note and vitals reviewed  Additional Data:     Labs:      Results from last 7 days  Lab Units 10/14/17  0505 10/13/17  1654   WBC Thousand/uL 5 05 6 80   HEMOGLOBIN g/dL 12 2 13 7   HEMATOCRIT % 36 0 40 1   PLATELETS Thousands/uL 172 194   NEUTROS PCT %  --  71   LYMPHS PCT %  --  20   MONOS PCT %  --  8   EOS PCT %  --  1       Results from last 7 days  Lab Units 10/14/17  0505 10/13/17  1654   SODIUM mmol/L 138 137   POTASSIUM mmol/L 3 4* 3 9   CHLORIDE mmol/L 104 99*   CO2 mmol/L 27 31   BUN mg/dL 11 16   CREATININE mg/dL 0 71 0 78   CALCIUM mg/dL 9 0 9 8   TOTAL PROTEIN g/dL  --  7 7   BILIRUBIN TOTAL mg/dL  --  0 44   ALK PHOS U/L  --  75   ALT U/L  --  30   AST U/L  --  23   GLUCOSE RANDOM mg/dL 83 97       Results from last 7 days  Lab Units 10/13/17  1654   INR  0 87       * I Have Reviewed All Lab Data Listed Above    * Additional Pertinent Lab Tests Reviewed: [facility] Admission Reviewed    Imaging:    Imaging Reports Reviewed Today Include: CT head x 2 CXR  Imaging Personally Reviewed by Myself Includes:  none    Recent Cultures (last 7 days):           Last 24 Hours Medication List:     amLODIPine 5 mg Oral Daily   fluticasone 1 spray Each Nare Daily   heparin (porcine) 5,000 Units Subcutaneous Q8H [facility]   [START ON 10/15/2017] levothyroxine 37 5 mcg Oral Early Morning   [START ON 10/15/2017] olmesartan 20 mg Oral Daily   sertraline 25 mg Oral Daily        Today, Patient Was Seen By: Luna Christensen PA-C    ** Please Note: This note has been constructed using a voice recognition system   **

## 2017-10-14 NOTE — PLAN OF CARE
CARDIOVASCULAR - ADULT     Maintains optimal cardiac output and hemodynamic stability Progressing        DISCHARGE PLANNING     Discharge to home or other facility with appropriate resources Progressing        INFECTION - ADULT     Absence or prevention of progression during hospitalization Progressing     Absence of fever/infection during neutropenic period Progressing        Knowledge Deficit     Patient/family/caregiver demonstrates understanding of disease process, treatment plan, medications, and discharge instructions Progressing        METABOLIC, FLUID AND ELECTROLYTES - ADULT     Electrolytes maintained within normal limits Progressing     Fluid balance maintained Progressing        NEUROSENSORY - ADULT     Achieves stable or improved neurological status Progressing     Achieves maximal functionality and self care Progressing        PAIN - ADULT     Verbalizes/displays adequate comfort level or baseline comfort level Progressing        Potential for Falls     Patient will remain free of falls Progressing        SAFETY ADULT     Maintain or return to baseline ADL function Progressing     Maintain or return mobility status to optimal level Progressing        SKIN/TISSUE INTEGRITY - ADULT     Skin integrity remains intact Progressing

## 2017-10-15 LAB
ANION GAP SERPL CALCULATED.3IONS-SCNC: 4 MMOL/L (ref 4–13)
BUN SERPL-MCNC: 11 MG/DL (ref 5–25)
CALCIUM SERPL-MCNC: 8.6 MG/DL (ref 8.3–10.1)
CHLORIDE SERPL-SCNC: 106 MMOL/L (ref 100–108)
CO2 SERPL-SCNC: 25 MMOL/L (ref 21–32)
CREAT SERPL-MCNC: 0.74 MG/DL (ref 0.6–1.3)
ERYTHROCYTE [DISTWIDTH] IN BLOOD BY AUTOMATED COUNT: 14.2 % (ref 11.6–15.1)
GFR SERPL CREATININE-BSD FRML MDRD: 74 ML/MIN/1.73SQ M
GLUCOSE SERPL-MCNC: 91 MG/DL (ref 65–140)
HCT VFR BLD AUTO: 32.6 % (ref 34.8–46.1)
HGB BLD-MCNC: 10.9 G/DL (ref 11.5–15.4)
MAGNESIUM SERPL-MCNC: 1.8 MG/DL (ref 1.6–2.6)
MCH RBC QN AUTO: 31 PG (ref 26.8–34.3)
MCHC RBC AUTO-ENTMCNC: 33.4 G/DL (ref 31.4–37.4)
MCV RBC AUTO: 93 FL (ref 82–98)
PLATELET # BLD AUTO: 129 THOUSANDS/UL (ref 149–390)
PMV BLD AUTO: 9.3 FL (ref 8.9–12.7)
POTASSIUM SERPL-SCNC: 3.8 MMOL/L (ref 3.5–5.3)
RBC # BLD AUTO: 3.52 MILLION/UL (ref 3.81–5.12)
SODIUM SERPL-SCNC: 135 MMOL/L (ref 136–145)
WBC # BLD AUTO: 4.97 THOUSAND/UL (ref 4.31–10.16)

## 2017-10-15 PROCEDURE — 83735 ASSAY OF MAGNESIUM: CPT | Performed by: PHYSICIAN ASSISTANT

## 2017-10-15 PROCEDURE — 80048 BASIC METABOLIC PNL TOTAL CA: CPT | Performed by: PHYSICIAN ASSISTANT

## 2017-10-15 PROCEDURE — 97163 PT EVAL HIGH COMPLEX 45 MIN: CPT

## 2017-10-15 PROCEDURE — 85027 COMPLETE CBC AUTOMATED: CPT | Performed by: PHYSICIAN ASSISTANT

## 2017-10-15 PROCEDURE — G8978 MOBILITY CURRENT STATUS: HCPCS

## 2017-10-15 PROCEDURE — G8979 MOBILITY GOAL STATUS: HCPCS

## 2017-10-15 RX ADMIN — LEVOTHYROXINE SODIUM 37.5 MCG: 75 TABLET ORAL at 05:51

## 2017-10-15 RX ADMIN — OLMESARTAN MEDOXOMIL 20 MG: 20 TABLET, FILM COATED ORAL at 09:30

## 2017-10-15 RX ADMIN — FLUTICASONE PROPIONATE 1 SPRAY: 50 SPRAY, METERED NASAL at 09:30

## 2017-10-15 RX ADMIN — SERTRALINE HYDROCHLORIDE 25 MG: 25 TABLET ORAL at 09:30

## 2017-10-15 RX ADMIN — AMLODIPINE BESYLATE 5 MG: 5 TABLET ORAL at 09:30

## 2017-10-15 RX ADMIN — HEPARIN SODIUM 5000 UNITS: 5000 INJECTION, SOLUTION INTRAVENOUS; SUBCUTANEOUS at 22:30

## 2017-10-15 RX ADMIN — HEPARIN SODIUM 5000 UNITS: 5000 INJECTION, SOLUTION INTRAVENOUS; SUBCUTANEOUS at 14:16

## 2017-10-15 RX ADMIN — HEPARIN SODIUM 5000 UNITS: 5000 INJECTION, SOLUTION INTRAVENOUS; SUBCUTANEOUS at 05:51

## 2017-10-15 NOTE — PHYSICAL THERAPY NOTE
PT EVALUATION  Patient Active Problem List   Diagnosis    Generalized weakness    Headache    Hypertensive urgency    Syncope    Weakness    HLD (hyperlipidemia)    Hypothyroidism     Past Medical History:   Diagnosis Date    Hypertension      History reviewed  No pertinent surgical history  10/15/17 1233   Note Type   Note type Eval only   Pain Assessment   Pain Score No Pain   Home Living   Type of Home Apartment  (Independent Living)   Home Layout One level  (0STE)   Home Equipment Walker;Cane   Prior Function   Level of [place] Independent with ADLs and functional mobility  (w/ RW)   Lives With Spouse   Receives Help From Family   ADL Assistance Independent   Falls in the last 6 months 0   Restrictions/Precautions   Weight Bearing Precautions Per Order No   Other Precautions Telemetry; Fall Risk   General   Additional Pertinent History chronic LLE weakness w/ L foot drop 2* to spinal stenosis   Family/Caregiver Present No   Cognition   Overall Cognitive Status WFL   Arousal/Participation Alert   Orientation Level Oriented to person;Oriented to place;Oriented to time   Following Commands Follows multistep commands without difficulty   RUE Assessment   RUE Assessment WFL   RUE Strength   RUE Overall Strength Within Functional Limits - strength 5/5   LUE Assessment   LUE Assessment WFL   LUE Strength   LUE Overall Strength Within Functional Limits - strength 5/5   RLE Assessment   RLE Assessment WFL   Strength RLE   RLE Overall Strength 4+/5   LLE Assessment   LLE Assessment WFL   Strength LLE   LLE Overall Strength 4/5   L Ankle Dorsiflexion 3-/5   Coordination   Movements are Fluid and Coordinated 1   Sensation WFL   Bed Mobility   Additional Comments unable to assess as pt OOB in chair pre & post session   Transfers   Sit to Stand 4  Minimal assistance   Additional items Assist x 1; Armrests; Increased time required;Verbal cues   Stand to Sit 4  Minimal assistance   Additional items Assist x 1;Armrests; Increased time required;Verbal cues   Ambulation/Elevation   Gait pattern Decreased foot clearance; Short stride; Excessively slow; Steppage  (L foot drop)   Gait Assistance 4  Minimal assist   Additional items Assist x 1;Verbal cues; Tactile cues   Assistive Device Rolling walker   Distance 20'x1   Balance   Static Sitting Good   Static Standing Fair -   Ambulatory Poor +   Activity Tolerance   Activity Tolerance Treatment limited secondary to medical complications (Comment)  (elevated BP + pressure at base of head)   Nurse Made Aware Chastity   Assessment   Prognosis Good   Problem List Decreased strength;Decreased endurance; Impaired balance;Decreased mobility   Assessment Pt  86 y  o female admitted for syncope & hypertensive urgency  Pt referred to PT for mobility assessment & D/C planning  PTA, pt reports being I w/ RW  On eval, pt demonstrate dec mobility, balance, endurance & amb 2* to present illness & deconditioning  Pt require minAx1 for most functional mobility + cues for techniques  Gait deviations as above, slow w/ L steppage gait 2* to L foot drop but no gross LOB noted  Pt reports chronic LLE weakness & L foot drop 2* to spinal stenosis  Limit amb 2* to elevated BP  No dizziness reported t/o session however c/o pressure at the base of head  BP as follows: 170/76 sitting; 202/94 standing  Will continue skilled PT to improve function & safety  At this time, D/C rec: STR vs HHPT pending pt's progress  Of note, pt prefers to return home  CM to follow  Pt remain OOB in chair at end of session w/o issues  Call bell in reach  Nsg staff to continue to mobilized pt as tolerated to prevent further decline in function  Nsg notified  Barriers to Discharge Other (Comment)  (medical status)   Goals   Patient Goals go home   STG Expiration Date 10/25/17   Short Term Goal #1 Goals to be met in 10 days: 1) inc strength & balance by 1/2 grade; 2) inc functional mobility to modified I; 3) inc amb w/ RW approx  >80' w/ S; 4) inc barthel score to 80; 5) pt/caregiver ed   Treatment Day 0   Plan   Treatment/Interventions Functional transfer training;LE strengthening/ROM; Therapeutic exercise; Endurance training;Patient/family training;Bed mobility;Gait training;Spoke to nursing   PT Frequency 5x/wk   Recommendation   Recommendation Short-term skilled PT; Home PT; Home with family support  (depending on progress; pt prefers to return home)   Equipment Recommended Walker  (RW)   PT - OK to Discharge No   Additional Comments pt to achieve PT goals prior to D/C home   Barthel Index   Feeding 10   Bathing 0   Grooming Score 5   Dressing Score 5   Bladder Score 10   Bowels Score 10   Toilet Use Score 10   Transfers (Bed/Chair) Score 10   Mobility (Level Surface) Score 0   Stairs Score 0   Barthel Index Score 60   Hx/personal factors: co-morbidities; fall risk; currently functioning below baseline; telemetry; elevated BP  Examination: dec mobility, dec balance, dec endurance, dec amb, high risk for falls  Clinical: unpredictable (ongoing medical status; abnormal lab values; carotid doppler pending; fall risk)  Complexity: high    Velinda Bullocks, PT

## 2017-10-15 NOTE — PROGRESS NOTES
[facility] Progress Note  Patient: Rivas Christian 80 y o  female   MRN: [id number]  PCP: Loyd Colon DO  Unit/Bed#: E4 -01 Encounter: [id number]  Date Of Visit: 10/15/17    Assessment:    Principal Problem:    Syncope  Active Problems:    Generalized weakness    Headache    Hypertensive urgency    Weakness    HLD (hyperlipidemia)    Hypothyroidism      Plan:    · Syncope unclear etiology but did present with hypertensive crisis continues to have some postural changes and sensation of head fullness especially the back of her head  BP remains labile apparently was only on Benicar 20 mg and Norvasc 2 5 which has now been increased to 5 mg daily was not taking Ace inhibitor as noted in [facility] assess further with 2D echocardiogram  · Headache continue sensation of fullness await carotid Doppler may need to assess further with an MRA to assess posterior circulation  · Hypothyroidism relatively new diagnosis early this year with an elevated TSH of over 8 0 with a normal free T4 we have increased her dosage to 37 5 mcg and should have repeat TSH in 4 to 6 weeks      VTE Pharmacologic Prophylaxis:   Pharmacologic: Heparin  Mechanical VTE Prophylaxis in Place: Yes    Discussions with Specialists or Other Care Team Provider:  No    Time Spent for Care: 45 minutes  More than 50% of total time spent on counseling and coordination of care as described above  Subjective:   Continue to complain of a fullness in the back of her head also some lightheadedness when she standing in remains with labile blood pressures and postural changes  She denies any nausea denies any ewa headache or chest pain  Objective:     Vitals:   Temp (24hrs), Av 7 °F (36 5 °C), Min:97 2 °F (36 2 °C), Max:98 °F (36 7 °C)    HR:  [61-99] 85  Resp:  [18] 18  BP: (101-176)/(56-79) 176/79  SpO2:  [96 %-99 %] 98 %  Body mass index is 19 53 kg/m²  Input and Output Summary (last 24 hours):        Intake/Output Summary (Last 24 hours) at 10/15/17 1007  Last data filed at 10/15/17 0901   Gross per 24 hour   Intake             3187 ml   Output             3075 ml   Net              112 ml       Physical Exam:     Physical Exam:   General appearance: alert, appears stated age and cooperative  Head: Normocephalic, without obvious abnormality, atraumatic  Lungs: clear to auscultation bilaterally  Heart: regular rate and rhythm  Abdomen: soft, non-tender; bowel sounds normal; no masses,  no organomegaly  Back: negative  Extremities: extremities normal, atraumatic, no cyanosis or edema  Neurologic: Grossly normal      Additional Data:     Labs:      Results from last 7 days  Lab Units 10/15/17  0438  10/13/17  1654   WBC Thousand/uL 4 97  < > 6 80   HEMOGLOBIN g/dL 10 9*  < > 13 7   HEMATOCRIT % 32 6*  < > 40 1   PLATELETS Thousands/uL 129*  < > 194   NEUTROS PCT %  --   --  71   LYMPHS PCT %  --   --  20   MONOS PCT %  --   --  8   EOS PCT %  --   --  1   < > = values in this interval not displayed  Results from last 7 days  Lab Units 10/15/17  0438  10/13/17  1654   SODIUM mmol/L 135*  < > 137   POTASSIUM mmol/L 3 8  < > 3 9   CHLORIDE mmol/L 106  < > 99*   CO2 mmol/L 25  < > 31   BUN mg/dL 11  < > 16   CREATININE mg/dL 0 74  < > 0 78   CALCIUM mg/dL 8 6  < > 9 8   TOTAL PROTEIN g/dL  --   --  7 7   BILIRUBIN TOTAL mg/dL  --   --  0 44   ALK PHOS U/L  --   --  75   ALT U/L  --   --  30   AST U/L  --   --  23   GLUCOSE RANDOM mg/dL 91  < > 97   < > = values in this interval not displayed  Results from last 7 days  Lab Units 10/13/17  1654   INR  0 87       * I Have Reviewed All Lab Data Listed Above  * Additional Pertinent Lab Tests Reviewed: All Labs For Current Hospital Admission Reviewed    Imaging:  Xr Chest 2 Views    Result Date: 10/13/2017  Narrative: CHEST INDICATION:  Syncope COMPARISON:  None VIEWS:  Frontal and lateral projections IMAGES:  2 FINDINGS:     Cardiomediastinal silhouette appears unremarkable   The lungs are clear  Mild chronic structure very changes are present  No pneumothorax or pleural effusion  There is moderate lumbar levoscoliosis  There is mild diffuse bone demineralization  Impression: No active pulmonary disease  Workstation performed: [id number]     Ct Head Without Contrast    Result Date: 10/13/2017  Narrative: CT BRAIN - WITHOUT CONTRAST INDICATION:  Hyperdensity in the right basal ganglia  COMPARISON:  Same date CT  TECHNIQUE:  CT examination of the brain was performed  In addition to axial images, coronal reformatted images were created and submitted for interpretation  Radiation dose length product (DLP) for this visit:  1004 mGy-cm   This examination, like all CT scans performed in the [facility], was performed utilizing techniques to minimize radiation dose exposure, including the use of iterative reconstruction and automated exposure control  IMAGE QUALITY:  Diagnostic  FINDINGS:  PARENCHYMA:  No intracranial mass, mass effect or midline shift  No CT signs of acute infarction  There is no parenchymal hemorrhage  Right basal ganglia hyperdensity representing a focus of calcification rather than hemorrhage  VENTRICLES AND EXTRA-AXIAL SPACES:  Enlargement of ventricles and extra-axial CSF spaces consistent with cerebral and cerebellar atrophy  VISUALIZED ORBITS AND PARANASAL SINUSES:  Unchanged  CALVARIUM AND EXTRACRANIAL SOFT TISSUES:   Normal      Impression: 1  No acute intracranial abnormality  2  Hyperdensity in the right basal ganglia representing a focus of calcification  Workstation performed: [id number]     Ct Head Without Contrast    Result Date: 10/13/2017  Narrative: CT BRAIN - WITHOUT CONTRAST INDICATION:  Syncope, head injury COMPARISON:  None  TECHNIQUE:  CT examination of the brain was performed  In addition to axial images, coronal reformatted images were created and submitted for interpretation    Radiation dose length product (DLP) for this visit:  959 mGy-cm   This examination, like all CT scans performed in the [facility], was performed utilizing techniques to minimize radiation dose exposure, including the use of iterative reconstruction and automated exposure control  IMAGE QUALITY:  Diagnostic  FINDINGS:  PARENCHYMA:  Decreased attenuation is noted in the supratentorial white matter demonstrating an appearance most consistent with moderate to marked microangiopathic change  No intracranial mass, mass effect or midline shift  No CT signs of acute infarction  Tiny 1 mm hyperdensity identified in the right basal ganglia likely represents focus of calcification rather than acute hemorrhage  I have no priors for comparison  Follow-up noncontrast head CT is recommended in 3-4 hours  VENTRICLES AND EXTRA-AXIAL SPACES:  Enlargement of ventricles and extra-axial CSF spaces consistent with cerebral and cerebellar atrophy  VISUALIZED ORBITS AND PARANASAL SINUSES:  Unremarkable  CALVARIUM AND EXTRACRANIAL SOFT TISSUES:   Normal      Impression: 1  Tiny 1 mm hyperdensity identified in the right basal ganglia likely represents focus of calcification and not acute hemorrhage  I have no priors for comparison  Follow-up noncontrast head CT is recommended in 3-4 hours to assure stability  No other areas of concern for acute intracranial abnormality  2  Volume loss/atrophy and white matter microangiopathy  Workstation performed: [id number]     Imaging Reports Reviewed Today Include:  Review CT of head and chest x-ray  Imaging Personally Reviewed by Myself Includes:  No  Procedure: Xr Chest 2 Views    Result Date: 10/13/2017  Narrative: CHEST INDICATION:  Syncope COMPARISON:  None VIEWS:  Frontal and lateral projections IMAGES:  2 FINDINGS:     Cardiomediastinal silhouette appears unremarkable  The lungs are clear  Mild chronic structure very changes are present  No pneumothorax or pleural effusion  There is moderate lumbar levoscoliosis    There is mild diffuse bone demineralization  Impression: No active pulmonary disease  Workstation performed: [id number]     Procedure: Ct Head Without Contrast    Result Date: 10/13/2017  Narrative: CT BRAIN - WITHOUT CONTRAST INDICATION:  Hyperdensity in the right basal ganglia  COMPARISON:  Same date CT  TECHNIQUE:  CT examination of the brain was performed  In addition to axial images, coronal reformatted images were created and submitted for interpretation  Radiation dose length product (DLP) for this visit:  1004 mGy-cm   This examination, like all CT scans performed in the [facility], was performed utilizing techniques to minimize radiation dose exposure, including the use of iterative reconstruction and automated exposure control  IMAGE QUALITY:  Diagnostic  FINDINGS:  PARENCHYMA:  No intracranial mass, mass effect or midline shift  No CT signs of acute infarction  There is no parenchymal hemorrhage  Right basal ganglia hyperdensity representing a focus of calcification rather than hemorrhage  VENTRICLES AND EXTRA-AXIAL SPACES:  Enlargement of ventricles and extra-axial CSF spaces consistent with cerebral and cerebellar atrophy  VISUALIZED ORBITS AND PARANASAL SINUSES:  Unchanged  CALVARIUM AND EXTRACRANIAL SOFT TISSUES:   Normal      Impression: 1  No acute intracranial abnormality  2  Hyperdensity in the right basal ganglia representing a focus of calcification  Workstation performed: [id number]     Procedure: Ct Head Without Contrast    Result Date: 10/13/2017  Narrative: CT BRAIN - WITHOUT CONTRAST INDICATION:  Syncope, head injury COMPARISON:  None  TECHNIQUE:  CT examination of the brain was performed  In addition to axial images, coronal reformatted images were created and submitted for interpretation  Radiation dose length product (DLP) for this visit:  959 mGy-cm     This examination, like all CT scans performed in the [facility], was performed utilizing techniques to minimize radiation dose exposure, including the use of iterative reconstruction and automated exposure control  IMAGE QUALITY:  Diagnostic  FINDINGS:  PARENCHYMA:  Decreased attenuation is noted in the supratentorial white matter demonstrating an appearance most consistent with moderate to marked microangiopathic change  No intracranial mass, mass effect or midline shift  No CT signs of acute infarction  Tiny 1 mm hyperdensity identified in the right basal ganglia likely represents focus of calcification rather than acute hemorrhage  I have no priors for comparison  Follow-up noncontrast head CT is recommended in 3-4 hours  VENTRICLES AND EXTRA-AXIAL SPACES:  Enlargement of ventricles and extra-axial CSF spaces consistent with cerebral and cerebellar atrophy  VISUALIZED ORBITS AND PARANASAL SINUSES:  Unremarkable  CALVARIUM AND EXTRACRANIAL SOFT TISSUES:   Normal      Impression: 1  Tiny 1 mm hyperdensity identified in the right basal ganglia likely represents focus of calcification and not acute hemorrhage  I have no priors for comparison  Follow-up noncontrast head CT is recommended in 3-4 hours to assure stability  No other areas of concern for acute intracranial abnormality  2  Volume loss/atrophy and white matter microangiopathy  Workstation performed: [id number]        Recent Cultures (last 7 days):           Last 24 Hours Medication List:     amLODIPine 5 mg Oral Daily   fluticasone 1 spray Each Nare Daily   heparin (porcine) 5,000 Units Subcutaneous Q8H [address]   levothyroxine 37 5 mcg Oral Early Morning   olmesartan 20 mg Oral Daily   sertraline 25 mg Oral Daily        Today, Patient Was Seen By: Beto Simpson MD    ** Please Note: Dragon 360 Dictation voice to text software may have been used in the creation of this document   **

## 2017-10-15 NOTE — PROGRESS NOTES
Assessment  1  Syncope (780 2) (R55)    Discussion/Summary  Discussion Summary: To emergency room, Noelle called Liliya  Counseling Documentation With Imm: The was counseled regarding impressions,-- importance of compliance with treatment  Chief Complaint  1  Weakness  Chief Complaint Free Text Note Form: Pt states Wed morning 8 am she felt weak, called her  to help her back to her bed and she passed out, woke up on the floor  She lives at [address] apartment, the nurse came over and all her vitals were fine  Thurs 10:30 am felt weak again but did not pass out  Nurse came again and vitals were fine  The nurse told her she could not call them again, if it happens she must to go emergicenter  Today at 11 am felt weak again, has head pressure and hot and cold  History of Present Illness  HPI: Patient states Wednesday morning she awoke, felt weak and passed out  She awoke on the floor and not sure how long she was out  Yesterday she had a near syncopal episode with this similar symptoms  Today she feels weak and is if she could pass out again  She also feels a pressure in the left side of her head  She denies any visual changes  No other associated complaints  No previous history  Hospital Based Practices Required Assessment:   Abuse And Domestic Violence Screen   Domestic violence screen not done today  Reason DV Screen not done: family in room    Depression And Suicide Screen  Suicide screen not done today  -- Reason suicide screen not done: family in room  Prefered Language is  [place]  Primary Language is  English  Review of Systems  ROS Reviewed:   ROS reviewed  Active Problems  1  Adult hypothyroidism (244 9) (E03 9)   2  Anxiety and depression (300 00,311) (F41 8)   3  Benign essential HTN (401 1) (I10)   4  Mixed hyperlipidemia (272 2) (E78 2)   5  Need for pneumococcal vaccination (V03 82) (Z23)   6  Pallor (782 61) (R23 1)   7   Sick sinus syndrome (427 81) (I49 5)   8  Spinal stenosis of lumbar region (724 02) (M48 061)    Past Medical History  1  History of hyperlipidemia (V12 29) (Z86 39)   2  History of hypertension (V12 59) (Z86 79)   3  History of spinal stenosis (V13 59) (Z87 39)   4  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)    Family History  Father    1  Family history of cardiac pacemaker (V17 49) (Z84 89)    Social History   · Housewife or homemaker   · Never a smoker   · Social drinker (V49 89) (Z78 9)    Surgical History  1  Denied: History of Recent Surgery    Current Meds   1  AmLODIPine Besylate 2 5 MG Oral Tablet; TAKE 1 TABLET DAILY  Requested for:   05Lbg0858; Last Rx:[id number] Ordered   2  Levothyroxine Sodium 25 MCG Oral Tablet; TAKE 1 TABLET DAILY AS DIRECTED; Therapy: 37BVZ4616 to [phone])  Requested for: 05Uhf9303; Last   Rx:[id number] Ordered   3  Olmesartan Medoxomil 20 MG Oral Tablet; TAKE 1 TABLET DAILY; Therapy: 72IHW4371 to (Evaluate:10Mar2018)  Requested for: 02Pze1825; Last   Rx:[id number] Ordered   4  Sertraline HCl - 25 MG Oral Tablet; one P O  in am with breakfast;   Therapy: 29UUP7487 to (Last Rx:06Mar2017)  Requested for: 72ZKC3786 Ordered    Allergies  1  Cozaar   2  HydroCHLOROthiazide TABS   3  Penicillins    Vitals  Signs   Recorded: 13SBX7498 03:27PM   Temperature: 97 3 F  Heart Rate: 75  Respiration: 18  Systolic: 254  Diastolic: 66  O2 Saturation: 100    Physical Exam    Constitutional   General appearance: No acute distress, well appearing and well nourished  Pulmonary   Respiratory effort: No increased work of breathing or signs of respiratory distress      Psychiatric   Orientation to person, place, and time: Normal     Mood and affect: Normal        Future Appointments    Date/Time Provider Specialty Site   05/26/3881 54:44 AM Kasey Van, [facility]     Signatures   Electronically signed by : Jamari Alicia, [facility]; Oct 13 2017  3:44PM EST (Author)    Electronically signed by : Campbell Goldsmith DO; Oct 14 2017  8:48AM EST                       (Co-author)

## 2017-10-15 NOTE — PLAN OF CARE
Problem: PHYSICAL THERAPY ADULT  Goal: Performs mobility at highest level of function for planned discharge setting  See evaluation for individualized goals  Treatment/Interventions: Functional transfer training, LE strengthening/ROM, Therapeutic exercise, Endurance training, Patient/family training, Bed mobility, Gait training, Spoke to nursing  Equipment Recommended: Darling Dill (RW)       See flowsheet documentation for full assessment, interventions and recommendations  Outcome: Progressing  Prognosis: Good  Problem List: Decreased strength, Decreased endurance, Impaired balance, Decreased mobility  Assessment: Pt  86 y  o female admitted for syncope & hypertensive urgency  Pt referred to PT for mobility assessment & D/C planning  PTA, pt reports being I w/ RW  On eval, pt demonstrate dec mobility, balance, endurance & amb 2* to present illness & deconditioning  Pt require minAx1 for most functional mobility + cues for techniques  Gait deviations as above, slow w/ L steppage gait 2* to L foot drop but no gross LOB noted  Pt reports chronic LLE weakness & L foot drop 2* to spinal stenosis  Limit amb 2* to elevated BP  No dizziness reported t/o session however c/o pressure at the base of head  BP as follows: 170/76 sitting; 202/94 standing  Will continue skilled PT to improve function & safety  At this time, D/C rec: STR vs HHPT pending pt's progress  Of note, pt prefers to return home  CM to follow  Pt remain OOB in chair at end of session w/o issues  Call bell in reach  [facility] staff to continue to mobilized pt as tolerated to prevent further decline in function  [facility] notified  Barriers to Discharge: Other (Comment) (medical status)     Recommendation: Short-term skilled PT, Home PT, Home with family support (depending on progress; pt prefers to return home)     PT - OK to Discharge: No    See flowsheet documentation for full assessment

## 2017-10-16 ENCOUNTER — GENERIC CONVERSION - ENCOUNTER (OUTPATIENT)
Dept: OTHER | Facility: OTHER | Age: 82
End: 2017-10-16

## 2017-10-16 ENCOUNTER — APPOINTMENT (INPATIENT)
Dept: NON INVASIVE DIAGNOSTICS | Facility: HOSPITAL | Age: 82
DRG: 305 | End: 2017-10-16
Payer: MEDICARE

## 2017-10-16 LAB
ANION GAP SERPL CALCULATED.3IONS-SCNC: 6 MMOL/L (ref 4–13)
BUN SERPL-MCNC: 11 MG/DL (ref 5–25)
CALCIUM SERPL-MCNC: 9.7 MG/DL (ref 8.3–10.1)
CHLORIDE SERPL-SCNC: 104 MMOL/L (ref 100–108)
CO2 SERPL-SCNC: 26 MMOL/L (ref 21–32)
CREAT SERPL-MCNC: 0.67 MG/DL (ref 0.6–1.3)
GFR SERPL CREATININE-BSD FRML MDRD: 80 ML/MIN/1.73SQ M
GLUCOSE SERPL-MCNC: 105 MG/DL (ref 65–140)
POTASSIUM SERPL-SCNC: 3.7 MMOL/L (ref 3.5–5.3)
SODIUM SERPL-SCNC: 136 MMOL/L (ref 136–145)

## 2017-10-16 PROCEDURE — 93005 ELECTROCARDIOGRAM TRACING: CPT | Performed by: INTERNAL MEDICINE

## 2017-10-16 PROCEDURE — 93306 TTE W/DOPPLER COMPLETE: CPT

## 2017-10-16 PROCEDURE — 93880 EXTRACRANIAL BILAT STUDY: CPT

## 2017-10-16 PROCEDURE — 80048 BASIC METABOLIC PNL TOTAL CA: CPT | Performed by: INTERNAL MEDICINE

## 2017-10-16 PROCEDURE — 97530 THERAPEUTIC ACTIVITIES: CPT

## 2017-10-16 PROCEDURE — 97116 GAIT TRAINING THERAPY: CPT

## 2017-10-16 RX ORDER — KETOROLAC TROMETHAMINE 30 MG/ML
15 INJECTION, SOLUTION INTRAMUSCULAR; INTRAVENOUS EVERY 12 HOURS PRN
Status: DISPENSED | OUTPATIENT
Start: 2017-10-16 | End: 2017-10-18

## 2017-10-16 RX ORDER — ASPIRIN 81 MG/1
81 TABLET ORAL DAILY
Status: DISCONTINUED | OUTPATIENT
Start: 2017-10-16 | End: 2017-10-20 | Stop reason: HOSPADM

## 2017-10-16 RX ORDER — METOPROLOL TARTRATE 5 MG/5ML
5 INJECTION INTRAVENOUS EVERY 6 HOURS PRN
Status: DISCONTINUED | OUTPATIENT
Start: 2017-10-16 | End: 2017-10-16

## 2017-10-16 RX ADMIN — AMLODIPINE BESYLATE 5 MG: 5 TABLET ORAL at 09:32

## 2017-10-16 RX ADMIN — SERTRALINE HYDROCHLORIDE 25 MG: 25 TABLET ORAL at 09:32

## 2017-10-16 RX ADMIN — LEVOTHYROXINE SODIUM 37.5 MCG: 75 TABLET ORAL at 05:05

## 2017-10-16 RX ADMIN — FLUTICASONE PROPIONATE 1 SPRAY: 50 SPRAY, METERED NASAL at 09:32

## 2017-10-16 RX ADMIN — HEPARIN SODIUM 5000 UNITS: 5000 INJECTION, SOLUTION INTRAVENOUS; SUBCUTANEOUS at 21:45

## 2017-10-16 RX ADMIN — METOPROLOL TARTRATE 25 MG: 25 TABLET ORAL at 21:45

## 2017-10-16 RX ADMIN — METOPROLOL TARTRATE 5 MG: 5 INJECTION INTRAVENOUS at 07:26

## 2017-10-16 RX ADMIN — METOPROLOL TARTRATE 25 MG: 25 TABLET ORAL at 09:32

## 2017-10-16 RX ADMIN — HEPARIN SODIUM 5000 UNITS: 5000 INJECTION, SOLUTION INTRAVENOUS; SUBCUTANEOUS at 13:37

## 2017-10-16 RX ADMIN — ASPIRIN 81 MG: 81 TABLET, COATED ORAL at 09:32

## 2017-10-16 NOTE — CONSULTS
Consultation - Neurology   Mag Hoffmann 80 y o  female MRN: [id number]  Unit/Bed#: E4 -01 Encounter: [id number]      Assessment/Plan   Assessment:  1  Episodes syncope and near syncope likely due to cerebral hypoperfusion from cardiac dysrhythmias (atrial fibrillation with rapid ventricular rate, pauses or bradycardia)  2  Intermittent brief episodes of head pressure associated with 1  These episodes do not seem to correlate with hypertension  3  Cognitive decline, MCI by history  4  Hypothyroidism  5  History of lumbar stenosis with left lower extremity neuropathic pain and distal left lower extremity weakness  This was redemonstrated on today's exam, additionally has mild sensory loss in the left toes  Otherwise neurologic examination is nonfocal  Plan:  1  Management of atrial fibrillation/sick sinus syndrome per Cardiology  This will likely result in relief of the head pressure sensations as well  2   Head pressure episodes are too brief to treat pharmacologically  3  Continue physical therapy and occupational therapy  4  Can follow in the [facility] [place] [id number] if desired  5  Further comments/recommendations per the attending neurologist    History of Present Illness     Physician Requesting Consult: Destiney Sanchez MD  Reason for Consult / Principal Problem:  Headache, syncope  Hx and PE limited by: Inconsistent and vague historian, intermittent anxiety    HPI: Mag Hoffmann is a 80y o  year old female who was referred from the urgent care to the emergency department at [facility] in [facility] and admitted on 10/13/2017 after 3 episodes of syncope or near-syncope since 10/11/2017  She presented with hypertension, highest reading to 221/105, for which she was given labetalol  She was admitted with syncope, hypertensive urgency  Her blood pressure medicines have been adjusted by the primary service, but she remains hypertensive at intervals  Her records indicate a history of sick sinus syndrome, but she remained in sinus rhythm until 0 300 on 10/16/2017, at which time atrial fibrillation was noted on telemetry with rapid ventricular rate, for which she was symptomatic with "weakness and heaviness "  Rate control was achieved using metoprolol  She has been seen by Cardiology, who will continue to use a beta-blocker, and are considering amiodarone  Anticoagulation at this point is deemed to be of greater risk than benefit  Neurology is asked to evaluate regarding the syncopal episodes and headaches  She has had episodes of weakness and collapse for several years, recalling at least 1 episode 5 years ago when she loss consciousness and was unable to [facility] from the neck down    She does not recall the results of her workup, but she was seen by a neurologist in [place]  In the past several months she has noted more frequency of these episodes culminating in multiple episodes last week  The 1st reason syncopal event happened on 10/11:  She became weak after exiting the bathroom after having a bowel movement, called for assistance, then passed out in the presence of her   Loss of consciousness was brief, and she was assessed by the facility nurse and found to be stable  She recalls that her blood pressure was 132/78  On 10/12, she again had an episode of weakness in the morning but without syncope, and her vital signs were again deemed stable by the facility nurse  On 10/13 at 1100 hours, she felt weak, felt pressure in the left side of her head, and was presyncopal   She was sent to the urgent care center, where her blood pressure was 152/66  She was then forwarded to the emergency department at [facility], as above    Since admission, she has had complaints of head fullness or pressure, sometimes described as localized to the back of the head, and sometimes seeming more global   During this interview, she insists that the pressure sensation is always posterior in her head, but he can radiate down her neck, through the bilateral shoulders into both arms  Events last anywhere from 30 seconds to 1 minute, always occur when she feels weak but can also occur without weakness symptoms  She believes she has had these head pressure sensations for several months  She can identify no triggers, nor any alleviating factors  She denies any associated neurologic deficits when she has the head pressure to include: visual changes, numbness or tingling, focal weakness  She denies nausea or vomiting  She has had occasional lightheadedness with stance  At time she describes her weakness as only occurring in her head      At the time of this examination, she is feeling 100% normal, without headache, weakness, heaviness  Inpatient consult to Neurology  Consult performed by: Maurice Burch ordered by: Chetna Kunz          Review of Systems   Constitutional:        No recent acute illnesses   HENT: Positive for hearing loss and sinus pressure  Eyes: Negative for visual disturbance  Respiratory: Negative for chest tightness and shortness of breath  Cardiovascular: Negative for chest pain and palpitations  Gastrointestinal: Negative for constipation, diarrhea, nausea and vomiting  Endocrine:        Hands and feet her chronically cold   Genitourinary: Positive for urgency  Hesitancy and slow stream   Musculoskeletal: Positive for back pain and gait problem  Negative for neck pain and neck stiffness  Chronic left ankle weakness   Skin: Negative  Neurological: Positive for syncope, weakness (Chronic left lower extremity weakness with foot drop), light-headedness and headaches  Neuropathic pain left lower extremity  Previously diagnosed with mild cognitive impairment    Psychiatric/Behavioral: Negative for confusion  The patient is nervous/anxious      All other systems reviewed and are negative  Historical Information   Past Medical History:   Diagnosis Date    Anxiety     Depression     Hyperlipidemia     Hypertension     Hyponatremia     Unclear etiology, possible SIADH    Left hand weakness     Neuralgia    Lumbar stenosis     w/ LLE weakness, pain    MCI (mild cognitive impairment)     Neuro cognitive testing revealed below standard memory performance      Sick sinus syndrome ([address] )      History reviewed  No pertinent surgical history  Social History   History   Alcohol Use    Yes     History   Drug Use No     History   Smoking Status    Former Smoker   Smokeless Tobacco    Never Used     Family History:   Family History   Problem Relation Age of Onset    Atrial fibrillation Sister     Father  at age 80, had pacemaker implanted    Review of previous medical records was  completed  Meds/Allergies   current meds:   Current Facility-Administered Medications   Medication Dose Route Frequency    acetaminophen (TYLENOL) tablet 650 mg  650 mg Oral Q6H PRN    ALPRAZolam (XANAX) tablet 0 25 mg  0 25 mg Oral TID PRN    amLODIPine (NORVASC) tablet 5 mg  5 mg Oral Daily    aspirin (ECOTRIN LOW STRENGTH) EC tablet 81 mg  81 mg Oral Daily    fluticasone (FLONASE) 50 mcg/act nasal spray 1 spray  1 spray Each Nare Daily    heparin (porcine) subcutaneous injection 5,000 Units  5,000 Units Subcutaneous Q8H [facility]    hydrALAZINE (APRESOLINE) injection 5 mg  5 mg Intravenous Q6H PRN    ketorolac (TORADOL) 30 mg/mL injection 15 mg  15 mg Intravenous Q12H PRN    levothyroxine tablet 37 5 mcg  37 5 mcg Oral Early Morning    metoprolol tartrate (LOPRESSOR) tablet 25 mg  25 mg Oral Q12H EVIE    ondansetron (ZOFRAN) injection 4 mg  4 mg Intravenous Q6H PRN    sertraline (ZOLOFT) tablet 25 mg  25 mg Oral Daily    and PTA meds:   Prior to Admission Medications   Prescriptions Last Dose Informant Patient Reported? Taking?    ALPRAZolam (XANAX) 0 25 mg tablet   Yes Yes   Sig: Take 0 25 mg by mouth as needed for anxiety   amLODIPine-benazepril (LOTREL 5-20) 5-20 MG per capsule   Yes Yes   Sig: Take 1 capsule by mouth daily   levothyroxine 25 mcg tablet   Yes Yes   Sig: Take 25 mcg by mouth daily   olmesartan (BENICAR) 40 mg tablet   Yes Yes   Sig: Take 40 mg by mouth daily   sertraline (ZOLOFT) 25 mg tablet   Yes Yes   Sig: Take 25 mg by mouth daily      Facility-Administered Medications: None    Aspirin 81 mg was recently discontinued by the PCP    Allergies   Allergen Reactions    Penicillins        Objective   Vitals:Blood pressure 121/85, pulse 63, temperature (!) 97 3 °F (36 3 °C), temperature source Tympanic, resp  rate 20, height 5' (1 524 m), weight 45 4 kg (100 lb), SpO2 98 %  ,Body mass index is 19 53 kg/m²  Intake/Output Summary (Last 24 hours) at 10/16/17 1425  Last data filed at 10/16/17 1401   Gross per 24 hour   Intake              480 ml   Output             1700 ml   Net            -1220 ml    BP ranges:    10/16 121//110  10/15 141//79  10/14 101//83  10/13 142//105    1 set of orthostatic vital signs on 10/15 showed increase by 30 mmHg from sitting to upright    Physical Exam   Constitutional: No distress  Thin, frail   HENT:   Head: Normocephalic and atraumatic  Mouth/Throat: Oropharynx is clear and moist    Nontender scalp   Eyes: Conjunctivae and EOM are normal  Pupils are equal, round, and reactive to light  No scleral icterus  Neck: Normal range of motion  Neck supple  No spinous process tenderness and no muscular tenderness present  Carotid bruit is not present  Passive range of motion does not trigger   Cardiovascular: Intact distal pulses  An irregularly irregular rhythm present  No murmur heard  Pulmonary/Chest: Effort normal and breath sounds normal    Abdominal: Soft  Bowel sounds are normal    Musculoskeletal: Normal range of motion  She exhibits edema (Trace in right greater than left foot)     Neurological: She has a normal Finger-Nose-Finger Test and a normal Heel to [employer]    Reflex Scores:       Tricep reflexes are 3+ on the right side and 3+ on the left side  Bicep reflexes are 3+ on the right side and 3+ on the left side  Brachioradialis reflexes are 3+ on the right side and 3+ on the left side  Patellar reflexes are 3+ on the right side and 3+ on the left side  Achilles reflexes are 2+ on the right side and 0 on the left side  Skin: Skin is dry  She is not diaphoretic  Distal lower extremities cool to touch   Psychiatric: Her speech is normal and behavior is normal  Judgment and thought content normal    Anxious; anxiety escalates during interview  Vitals reviewed  Neurologic Exam     Mental Status   Oriented to person  Oriented to place  Registration: recalls 3 of 3 objects  Recall at 5 minutes: recalls 2 of 3 objects (Unable to retrieve 3rd object from category cues or list)  Follows 2 step commands  Attention: decreased (Intact recitation of reverse order months, impaired attention to exam and examiner)  Concentration: decreased  Speech: speech is normal   Level of consciousness: alert  Able to perform simple calculations (Able to correct errors)  Able to name object  Able to repeat  Normal comprehension  Cranial Nerves     CN II   Visual fields full to confrontation  CN III, IV, VI   Pupils are equal, round, and reactive to light  Extraocular motions are normal    Right pupil: Size: 2 mm  Shape: regular  Reactivity: brisk  Left pupil: Size: 2 mm  Shape: regular  Reactivity: brisk  Nystagmus: none   Conjugate gaze: present    CN V   Facial sensation intact  Right facial sensation deficit: none  Left facial sensation deficit: none    CN VII   Facial expression full, symmetric     Right facial weakness: none  Left facial weakness: none    CN VIII   CN VIII normal    Hearing: intact (To finger rub)    CN IX, X   CN IX normal    CN X normal    Palate: symmetric    CN XI   CN XI normal    Right sternocleidomastoid strength: normal  Left sternocleidomastoid strength: normal  Right trapezius strength: normal  Left trapezius strength: normal    CN XII   CN XII normal    Tongue deviation: none  Unable to visualize fundi     Motor Exam   Muscle bulk: normal  Overall muscle tone: normal  Right arm pronator drift: absent  Left arm pronator drift: absent    Strength   Strength 5/5 except as noted     Left anterior tibial: 3/5  Left posterior tibial: 4/5    Sensory Exam   Light touch normal    Right arm vibration: normal  Left arm vibration: normal  Right leg vibration: normal  Left leg vibration: decreased from toes  Right arm pinprick: normal  Left arm pinprick: normal  Right leg pinprick: normal  Left leg pinprick: decreased from toes  Temperature intact     Gait, Coordination, and Reflexes     Gait  Gait: (Deferred)    Coordination   Finger to nose coordination: normal  Heel to shin coordination: normal    Tremor   Action tremor: left arm and right arm (Very low-amplitude high-frequency)    Reflexes   Right brachioradialis: 3+  Left brachioradialis: 3+  Right biceps: 3+  Left biceps: 3+  Right triceps: 3+  Left triceps: 3+  Right patellar: 3+  Left patellar: 3+  Right achilles: 2+  Left achilles: 0  Right plantar: upgoing  Left plantar: equivocal  Right ankle clonus: absent  Left ankle clonus: absentFine motor repetitions:  Symmetric finger taps           Lab Results:   CBC:   Results from last 7 days  Lab Units 10/15/17  0438 10/14/17  0505 10/13/17  1654   WBC Thousand/uL 4 97 5 05 6 80   RBC Million/uL 3 52* 3 88 4 35   HEMOGLOBIN g/dL 10 9* 12 2 13 7   HEMATOCRIT % 32 6* 36 0 40 1   MCV fL 93 93 92   PLATELETS Thousands/uL 129* 172 194   , BMP/CMP:   Results from last 7 days  Lab Units 10/16/17  0459 10/15/17  0438 10/14/17  0505 10/13/17  1654   SODIUM mmol/L 136 135* 138 137   POTASSIUM mmol/L 3 7 3 8 3 4* 3 9   CHLORIDE mmol/L 104 106 104 99*   CO2 mmol/L 26 25 27 31   ANION GAP mmol/L 6 4 7 7   BUN mg/dL 11 11 11 16   CREATININE mg/dL 0 67 0 74 0 71 0 78   GLUCOSE RANDOM mg/dL 105 91 83 97   CALCIUM mg/dL 9 7 8 6 9 0 9 8   AST U/L  --   --   --  23   ALT U/L  --   --   --  30   ALK PHOS U/L  --   --   --  75   TOTAL PROTEIN g/dL  --   --   --  7 7   ALBUMIN g/dL  --   --   --  4 0   BILIRUBIN TOTAL mg/dL  --   --   --  0 44   EGFR ml/min/1 73sq m 80 74 77 69   , TSH:   Results from last 7 days  Lab Units 10/13/17  1654   TSH 3RD GENERATON uIU/mL 8 069*   , Coagulation:   Results from last 7 days  Lab Units 10/13/17  1654   INR  0 87   , Urinalysis:   Results from last 7 days  Lab Units 10/13/17  1718   COLOR UA  Yellow   CLARITY UA  Clear   SPEC GRAV UA  1 015   PH UA  7 0   LEUKOCYTES UA  Negative   NITRITE UA  Negative   PROTEIN UA mg/dl Negative   GLUCOSE UA mg/dl Negative   KETONES UA mg/dl Trace*   BILIRUBIN UA  Negative   BLOOD UA  Negative   Mag 1 8    TSH: 8 02 (03/2017) FT4 0 83, Rx Synthroid 25mcgm=> 04/2017 TSH 2 73=> 08/2017 TSH 0 57  03/2017 B12 >1450, Vit D 36    Imaging Studies: I have personally reviewed pertinent reports  and I have personally reviewed pertinent films in PACS   10/13/2017 @ 2135 CT head: No intracranial mass, mass effect or midline shift  No CT signs of acute infarction  There is no parenchymal hemorrhage  Right basal ganglia hyperdensity representing a focus of calcification rather than hemorrhage  Enlargement of ventricles and extra-axial CSF spaces consistent with cerebral and cerebellar atrophy  10/13/2017 @ 1720 CT head: Decreased attenuation is noted in the supratentorial white matter demonstrating an appearance most consistent with moderate to marked microangiopathic change  No intracranial mass, mass effect or midline shift  No CT signs of acute infarction  Tiny 1 mm hyperdensity identified in the right basal ganglia likely represents focus of calcification rather than acute hemorrhage  No priors for comparison       EKG, Pathology, and Other Studies: I have personally reviewed pertinent reports  CXR:Cardiomediastinal silhouette appears unremarkable  The lungs are clear  Mild chronic structure very changes are present  No pneumothorax or pleural effusion  There is moderate lumbar levoscoliosis  There is mild diffuse bone demineralization      Carotid ultrasound:  Pending  Echocardiogram:  Pending  VTE Prophylaxis: Sequential compression device (Venodyne)  and Heparin

## 2017-10-16 NOTE — CONSULTS
Consult - Cardiology   Mohamud Snowden 80 y o  female MRN: [id number]  Unit/Bed#: E4 -01 Encounter: [id number]        Reason For Consult:  Atrial fibrillation               Assessment and Plan:     1  Paroxysmal atrial fibrillation: New diagnosis  Variable ventricular rate, at times with RVR    Pt poorly aware of rate/rhythm   Agree with initiation of  AV blocking Rx (metoprolol) by [facility] ([facility])  Would consider amiodarone for maintenance of sinus rhythm especially if blood pressure issues become problematic   Check echocardiogram   With recent syncopal falls, risks of systemic anticoagulation seemingly exceeds benefit at this point  Can reconsider in the future  2   Syncope: Etiology uncertain thus far   Echocardiogram forthcoming as above for evaluation of structural heart disease to risk stratify for related etiologies  Favorably, current exam does not suggest significant valvular heart disease and history does not seem to imply significant cardiomyopathy  With concern for possible sick sinus syndrome/tachy-frederic syndrome, possibly with conversion pauses, we would advise continued telemetry  Should a definitive diagnosis not be found during her hospitalization post hospital monitoring may prove beneficial   While plausible, orthostatic hypotension has seemingly been discounted   Though inconclusive, some of the patient's historical features may suggest a vasovagal etiology   Neurology evaluation forthcoming for consideration of alternative etiologies  3   Hypertension:     Somewhat labile without hypotension  Beta-blocker initiated as above  Follow response  ECHO pending  4  Hypothyroidism:     Current TSH elevated at 8 06 ~~> Synthroid adjusted by [facility] ([facility])        History Of Present Illness:   This woman is an 27-year-old not cared for by cardiologist   She had moved to our local area within the last year and is presently cared for by Dr Damaris Salamanca  Her  currently reside at [facility]  Though some of the details are vague on 10/11 the patient seemingly suffered a syncopal fall in her residence  Following an unremarkable bowel movement the patient was exiting the bathroom when she felt acutely weak  It is hard for the patient to clarify exactly what weak entails as she denies: dizziness, lightheadedness, visual disturbance, gait dysfunction, cardiac ectopy or chest pain  She called to her  for assistance with him promptly coming to her aid  She fell to the floor with apparent loss of consciousness  She suggested that she quickly woke without sequela and with the assistance of her  was assisted back to bed  She indicates that she was checked by one of the nurses with  Everything "checking out ok"  She sought no other evaluation at that time  The following day, the patient states she suffered a 2nd episode  The details of this are less clear but she suggests that she had been on her feet ambulating for approximately 2 minutes when she had an acute sensation of recurrent weakness  Again I have tried to clarify this with her denying associated visual disturbance lightheadedness, chest pain, or perceived ectopy  It does not seem that the patient had fallen  She believe she sat down with improvement in symptoms again without sequelae  The following day, Friday, 10/13, the patient may have had recurrent symptoms and after prompting of her daughter, she was evaluated at an urgent care facility  From there she was referred to the hospital and admitted  Review of that note indicates that the patient had described a sensation of some head pressure and perhaps feeling flushed then weak  Greater detail regarding the circumstances is unknown  Since arrival the patient has been observed to have intermittent atrial fibrillation - at times with RVR  She denies a previous known history of the same    She does not perceive her heart rate rhythm though with questioning she tells me she has in the past with pulse checks noticed intermittent irregularity and rapidity  Since her admission she was also been observed to have some intermittent accelerated hypertension with systolic pressures at times to the 190s  There are some wide variability with additional blood pressures at times in the low 100s  Orthostatic blood pressures have not shown any orthostatic gradient  She has had an abnormal TSH of 8 though she does have a known history of hypothyroidism  Past Medical History:        Past Medical History:   Diagnosis Date    Hypertension     History reviewed  No pertinent surgical history  Allergy:        Allergies   Allergen Reactions    Penicillins        Medications:       Prior to Admission medications    Medication Sig Start Date End Date Taking?  Authorizing Provider   ALPRAZolam Jimmey Maze) 0 25 mg tablet Take 0 25 mg by mouth as needed for anxiety   Yes Historical Provider, MD   amLODIPine-benazepril (LOTREL 5-20) 5-20 MG per capsule Take 1 capsule by mouth daily   Yes Historical Provider, MD   levothyroxine 25 mcg tablet Take 25 mcg by mouth daily   Yes Historical Provider, MD   olmesartan (BENICAR) 40 mg tablet Take 40 mg by mouth daily   Yes Historical Provider, MD   sertraline (ZOLOFT) 25 mg tablet Take 25 mg by mouth daily   Yes Historical Provider, MD       Family History:     Family History   Problem Relation Age of Onset    Atrial fibrillation Sister         Social History:       Social History     Social History    Marital status: /Civil Union     Spouse name: N/A    Number of children: N/A    Years of education: N/A     Social History Main Topics    Smoking status: Former Smoker    Smokeless tobacco: Never Used    Alcohol use Yes    Drug use: No    Sexual activity: Not Asked     Other Topics Concern    None     Social History Narrative    None       ROS:  Symptoms per HPI  Ambulates with the assistance of walker and a cane  Occasional arthralgias      Exam:  General:  Alert, pleasant, cooperative, comfortable-appearing female  Appears slightly younger than her chronologic age  Head: Normocephalic, atraumatic  Eyes:  EOMI  Pupils - equal, round, reactive to accomodation  No icterus  Normal Conjunctiva  Oropharynx:  Moist without lesion  Tongue without signs of trauma  Neck:  No bruit or JVD  Heart:  Regular with controlled rate and no pathologic murmur and no rub  Lungs:  Clear without rales, rhonchi, wheeze  Abdomen: flat, normal findings: bowel sounds normal, no masses palpable, no organomegaly and soft, non-tender  Lower Limbs:  no pitting edema  Pulses[de-identified]  RLE - DP: present 2+, PT: present 2+                 LLE - DP: present 2+, PT: present 2+  Musculoskeletal: Independent movement of limbs observed, Formal ROM and strength eval not performed  Neurologic:    Oriented to: person , place, situation  Cranial Nerves: grossly intact - vision, smell, taste, and hearing  were not tested  Motor function: grossly normal, symmetric   Sensation: was not tested  DATA:      ECG:  Normal sinus rhythm with normal intervals and normal axis  Heart rate approximately 60 beats per minute  No acute ischemic change  Telemetry:  Intermittent atrial fibrillation with variable ventricular rates   Lowest sinus rate = 60 beats per minute          Echocardiogram:   Pending            Weights: Wt Readings from Last 3 Encounters:   10/13/17 45 4 kg (100 lb)   , Body mass index is 19 53 kg/m²           Lab Studies:               Results from last 7 days  Lab Units 10/15/17  0438 10/14/17  0505 10/13/17  1654   WBC Thousand/uL 4 97 5 05 6 80   HEMOGLOBIN g/dL 10 9* 12 2 13 7   HEMATOCRIT % 32 6* 36 0 40 1   PLATELETS Thousands/uL 129* 172 194   ,   Results from last 7 days  Lab Units 10/16/17  0459 10/15/17  0438 10/14/17  0505 10/13/17  1654   SODIUM mmol/L 136 135* 138 137   POTASSIUM mmol/L 3 7 3 8 3 4* 3 9   CHLORIDE mmol/L 104 106 104 99*   CO2 mmol/L 26 25 27 31   BUN mg/dL 11 11 11 16   CREATININE mg/dL 0 67 0 74 0 71 0 78   CALCIUM mg/dL 9 7 8 6 9 0 9 8   TOTAL PROTEIN g/dL  --   --   --  7 7   BILIRUBIN TOTAL mg/dL  --   --   --  0 44   ALK PHOS U/L  --   --   --  75   ALT U/L  --   --   --  30   AST U/L  --   --   --  23   GLUCOSE RANDOM mg/dL 105 91 83 97

## 2017-10-16 NOTE — CASE MANAGEMENT
Initial Clinical Review    Admission: Date/Time/Statement: 10/13/17 @ 2206     Orders Placed This Encounter   Procedures    Inpatient Admission (expected length of stay for this patient is greater than two midnights)     Standing Status:   Standing     Number of Occurrences:   1     Order Specific Question:   Admitting Physician     Answer:   Peña Kilpatrick [[id number]]     Order Specific Question:   Level of Care     Answer:   Med Surg [[id number]]     Order Specific Question:   Estimated length of stay     Answer:   More than 2 Midnights     Order Specific Question:   Certification     Answer:   I certify that inpatient services are medically necessary for this patient for a duration of greater than two midnights  See H&P and MD Progress Notes for additional information about the patient's course of treatment  ED: Date/Time/Mode of Arrival:   ED Arrival Information     Expected Arrival Acuity Means of Arrival Escorted By Service Admission Type    10/13/2017 15:42 10/13/2017 16:09 Urgent Walk-In Family Member General Medicine Urgent    Arrival Complaint    SYNCOPAL EPISODES          Chief Complaint:   Chief Complaint   Patient presents with    Weakness - Generalized     Pt reports generalized weakness, fatigue and headache off and on since wednesday  pt reports had a syncopal episode wednesday but denies hitting head or LOC    Headache     History of Illness:  Pt  Had a syncopal episode 2 days ago, witnessed by her   +LOC for few seconds  Pt  Lives in independent living -walks with a walker  Then yesterday pt  Called the nurse at the facility that she lives at and was checked out , her vitals were fine   +generalized weakness since 2 days ago  +headaches (pressure), comes and goes for 2 days        No fevers, pt  Has some post nasal drip, no cough, no n/v/d  Pt  Is eating and drinking okay the past 2 days  Pt  Was at urgent care and then sent here    Her bp was normal at urgent care but elevated to 221/84 here now in the ER  She is taking her bp meds regularly  Doesn't take blood thinners      ED Vital Signs:   ED Triage Vitals [10/13/17 1615]   Temperature Pulse Respirations Blood Pressure SpO2   97 8 °F (36 6 °C) 80 18 (!) 221/84 100 %      Temp Source Heart Rate Source Patient Position - Orthostatic VS BP Location FiO2 (%)   Oral Monitor Lying Right arm --      Pain Score       5        Wt Readings from Last 1 Encounters:   10/13/17 45 4 kg (100 lb)       Vital Signs (abnormal):   10/13/17 2226 -- 60 16  175/74 99 % -- JUAN MIGUEL   10/13/17 2115 -- 64 16 155/69 99 % -- JUAN MIGUEL   10/13/17 2018 -- 62 16 163/73 100 % -- JUAN MIGUEL   10/13/17 1918 -- 84 16  180/88 99 % -- JUAN MIGUEL   10/13/17 1850 -- 62 16  178/80 99 % -- JUAN MIGUEL   10/13/17 1848 -- 65 16  192/72 99 % -- JUAN MIGUEL   10/13/17 1809 -- 71 18  221/105        Abnormal Labs/Diagnostic Test Results: tsh 8 069,   PT 11 8,  Cl 99    CXR: No active pulmonary disease  CT Head:1  Tiny 1 mm hyperdensity identified in the right basal ganglia likely represents focus of calcification and not acute hemorrhage  I have no priors for comparison  Follow-up noncontrast head CT is recommended in 3-4 hours to assure stability  No other   areas of concern for acute intracranial abnormality  2  Volume loss/atrophy and white matter microangiopathy  Repeat CT Head: 1  No acute intracranial abnormality  2  Hyperdensity in the right basal ganglia representing a focus of calcification      ED Treatment:   Medication Administration from 10/13/2017 1542 to 10/13/2017 2335       Date/Time Order Dose Route Action Action by Comments     10/13/2017 1831 sodium chloride 0 9 % bolus 1,000 mL 0 mL Intravenous Stopped Bertha Marquez RN      10/13/2017 1655 sodium chloride 0 9 % bolus 1,000 mL 1,000 mL Intravenous New Bag Chilo Medel RN      10/13/2017 1843 labetalol (NORMODYNE) injection 10 mg 10 mg Intravenous Given Chilo Medel RN BP:236/98HR:71     10/13/2017 1911 labetalol (NORMODYNE) injection 10 mg 10 mg Intravenous Given Keon Ford RN BP:200/101HR: 98     10/13/2017 2158 ALPRAZolam Buddie Frederick) tablet 0 25 mg 0 25 mg Oral Not Given Keon Ford RN      10/13/2017 2158 ALPRAZolam Buddie James) tablet 0 125 mg 0 125 mg Oral Given Keon Ford RN           Past Medical/Surgical History: Active Ambulatory Problems     Diagnosis Date Noted    No Active Ambulatory Problems     Resolved Ambulatory Problems     Diagnosis Date Noted    No Resolved Ambulatory Problems     Past Medical History:   Diagnosis Date    Hypertension        Admitting Diagnosis: Syncope [R55]  Weakness [R53 1]  Hypertensive urgency [I16 0]  Generalized weakness [R53 1]  Headache [R51]    Age/Sex: 80 y o  female    Assessment/Plan:   Principal Problem:    Syncope  Active Problems:    Generalized weakness    Headache    Hypertensive urgency    Weakness     Syncope/Head pressure  CT of hypertensive crisis head was unremarkable for any acute abnormality  Will get orthostatic vitals  Fall precaution  Under carotid Doppler and 2D echocardiogram for further workup of syncope      Accelerated hypertension  Blood pressure improved with IV labetalol  Allow permissive hypertension  Will put holding parameters on Lotrel 5/20 and olmesartan     Depression  Continue home medications     Hypokalemia   Replete     Hypothyroidism  Continue Synthroid     VTE Prophylaxis: Heparin  / sequential compression device   Code Status:  Full code  POLST: POLST form is not discussed and not completed at this time  Anticipated Length of Stay:  Patient will be admitted on an Inpatient basis with an anticipated length of stay of  < 2 midnights     Justification for Hospital Stay:  Syncope workup, blood pressure control and monitor       Admission Orders:  TELE  SCD's  Fall precautions  ECHO  Carotid US  Ortho BP's q shift  PT Eval    KDUR 20 po x 1  Scheduled Meds:   amLODIPine 5 mg Oral Daily   aspirin 81 mg Oral Daily   fluticasone 1 spray Each Nare Daily   heparin (porcine) 5,000 Units Subcutaneous Q8H [address]   levothyroxine 37 5 mcg Oral Early Morning   metoprolol tartrate 25 mg Oral Q12H [address]   sertraline 25 mg Oral Daily     Continuous Infusions:  IVF at 75 / hr  PRN Meds:   Acetaminophen x 2    ALPRAZolam    hydrALAZINE    ketorolac    ondansetron      St  [address] [facility] in the [facility] by Lon Page for 2017  Network Utilization Review Department  Phone: [phone]; Fax [phone]  ATTENTION: The Network Utilization Review Department is now centralized for our 7 Facilities  Make a note that we have a new phone and fax numbers for our Department  Please call with any questions or concerns to [phone] and carefully follow the prompts so that you are directed to the right person  All voicemails are confidential  Fax any determinations, approvals, denials, and requests for initial or continue stay review clinical to [phone]  Due to HIGH CALL volume, it would be easier if you could please send faxed requests to expedite your requests and in part, help us provide discharge notifications faster

## 2017-10-16 NOTE — PROGRESS NOTES
Pt's HR was consistently between 120-130 for about an hour  Pt is asymptomatic  Notified Rapid Response Practitioner and will continue to monitor heart rate  Interventions may be needed if HR sustained above 130's

## 2017-10-16 NOTE — PROGRESS NOTES
Progress Note - Bruce Blancas 80 y o  female MRN: [id number]    Unit/Bed#: E4 -01 Encounter: [id number]      Assessment/Plan:  1-new atrial fibrillation with rapid ventricular rate:  Patient was noted to have atrial fibrillation early this morning, with a rapid ventricular rate  She was given 5 mg of IV metoprolol, and is currently in normal sinus rhythm-sinus tachycardia  -2D echocardiogram pending   -continue telemetry   -TSH noted to be elevated, patient without history of hyperthyroidism    -Eusebia Vasc 2 score is 4: Will confer with Cardiology regarding anticoagulation  -reviewed her outpatient records  No history of atrial fibrillation in the past    -check EKG   -will start oral beta blocker with metoprolol 25 mg q 12 hours  -patient noted posterior headache, heaviness going down both arms, and a sensation of weakness in her head, with her episode of rapid ventricular rate this morning  She notes this was similar to her symptoms on presentation  2-syncope:  Patient presented after a syncopal episode last wed  She felt generalized weakness when she was standing  She then called her , and asked him to assist her in walking to the bed  She notes however she blacked out  When she awakened she was lying on her stomach on the floor, and not yet reached the bed  She and her  estimated she was unresponsive for half of a minute  There was no postictal state  There was no seizure-like activity witnessed or urinary incontinence  Patient denies any associated new weakness, numbness, balance problems, visual changes  She noted a posterior headache  She denied any chest pain, chest pressure, or palpitations associated with that episode    -Patient is undergoing evaluation for the etiology of her syncope  A) neurologic:  Patient's CT scan of the brain did not reveal any acute changes  She has no focal deficits on exam consistent with acute infarct    She however has had persistent sensation of headache as well as weakness in her head since that time  Question whether this could be a posterior circulation event    -will confer with the neurology service   -no seizure activity witnessed    -carotid ultrasound pending     B) cardiology:  Patient's initial troponin did not reveal any evidence of ischemia    -check EKG   -echocardiogram pending   -patient's symptoms may be secondary to arrhythmia  Continue to monitor on telemetry  continue to evaluate for atrial fibrillation as above   -check orthostatic vital signs    3-hypertensive urgency:  Patient initially presented with elevated blood pressure  Antihypertensive regimen has been titrated with improved control  Patient's initial blood pressure this morning however was 196/110  She was given 5 mg of IV metoprolol with improvement in her blood pressure to 150/74    -will change Benicar to metoprolol in light of atrial fibrillation   -continue amlodipine  This may be changed to diltiazem, and she requires further rate control  4-headache:  Possibly secondary to atrial fibrillation  Continue analgesics and monitor  Will confer with the neurology service  Patient notes that is a posterior headache  She notes she has associated symptom of heaviness radiating down her posterior neck, and down both arms  She denies any exacerbating or alleviating factors  She notes that occurs intermittently without any trigger  She denies any associated with position  She denies any associated numbness, weakness, tingling, or decreased sensation  She denies any associated chest pain, pressure, or dyspnea  5-hypothyroidism:  Patient's initial TSH was elevated at 8  Her Synthroid was increased to 37 5 mcg  Continue to monitor    -outpatient TSH in 6 weeks  6-hyperlipidemia:  On diet therapy  7-incidental finding:  Admission CT scan revealed a 1 mm hyperdensity in the right basal ganglia    Repeat CT scan noted this was calcification, and clarified there is no evidence of hemorrhage  8-depression:  Continue home Zoloft    9-incidental finding: Thrombocytopenia: platelet count noted to be 129 on 10/15   -will repeat  10-family:  Called daughter Sandy Cartagena and gave update    VTE Pharmacologic Prophylaxis: Heparin  VTE Mechanical Prophylaxis: sequential compression device    Certification Statement: The patient will continue to require additional inpatient hospital stay due to need for further acute intervention for new atrial fibrillation, syncope    Status: inpatient     Total time spent today including reviewing her chart, her outpatient records, her interview and exam, discussion with patient and her nurse:  50 minutes     ===================================================================    Subjective:  Patient relates that she felt weak  She relates this sensation occurs only in her head  She notes that awaken her from sleep at approximately 6:00 a m  Ciarra Lightning She denies any dizziness or lightheadedness  She denies any balance instability when walking  She notes this sensation of weakness is located only in her head, not in her extremities  Patient notes the headache is located in the back of her head, in the lower region  She denies any other areas of pain in her head  She notes however this discomfort radiates down both arms as a heaviness  She denies any associated chest pain, pressure, tightness or heaviness  She denies any shortness of breath  She denies any associated visual changes  Patient notes the headache with associated heaviness down both arms occurs intermittently  Last only a few seconds to a few minutes  Never as long as an hour  She denies any exacerbating or alleviating factors  She denies any triggers as his exertion, or positional changes  She denies any associated numbness, tingling, or decreased sensation      She relates she has a history of lumbar spinal stenosis, as well as neuropathic pain down her left leg  She notes the pain is most prominent usually at the plantar surface of her left foot  She denies any dizziness with standing up today  She denied unsteady gait with ambulating to the restroom  Patient she continues to have a headache  She denies any focal weakness  She denies any chest pain or chest pressure  She denies any pain anywhere else apart from a headache  Patient denies any previous history of heart disease, MI, or arrhythmias  She notes her father had a pacemaker  Patient notes she has excellent exercise tolerance  She relates she was ambulating up 3 sets of stairs daily in her house, without dyspnea on exertion, prior to December, when she moved to an apartment  She denies any shortness of breath or cough  She denies any nausea, vomiting, diarrhea  Physical Exam:   Temp:  [95 7 °F (35 4 °C)-98 °F (36 7 °C)] 98 °F (36 7 °C)  HR:  [] 102  Resp:  [18-20] 20  BP: (142-196)/() 150/74    Gen:  Pleasant, non-tachypnic, non-dyspnic  Conversant  Heart: regular rate and rhythm, S1S2 present, no murmur, rub or gallop  Lungs: clear to ausculatation bilaterally  No wheezing, crackles, or rhonchi  No accessory muscle use or respiratory distress  Abd: soft, non-tender, non-distended  NABS, no guarding, rebound or peritoneal signs  Extremities: no clubbing, cyanosis or edema  2+pedal pulses bilaterally  Neuro: awake, alert and oriented  Observed ambulating to the restroom with the assistance of an aide, and her cane in her left hand  Unsteady gait noted  Cranial nerves 2-12 intact  Strength:  Bilateral  5/5, bilateral biceps 5/5  Right hip 5/5 flexion  Left hip:  Limited by pain during exam   Speech fluent and goal directed  Skin: warm and dry: no petechiae, purpura and rash      LABS:     Results from last 7 days  Lab Units 10/15/17  0438 10/14/17  0505 10/13/17  1654   WBC Thousand/uL 4 97 5 05 6 80   HEMOGLOBIN g/dL 10 9* 12 2 13 7   HEMATOCRIT % 32 6* 36 0 40 1   PLATELETS Thousands/uL 129* 172 194       Results from last 7 days  Lab Units 10/16/17  0459 10/15/17  0438 10/14/17  0505   SODIUM mmol/L 136 135* 138   POTASSIUM mmol/L 3 7 3 8 3 4*   CHLORIDE mmol/L 104 106 104   CO2 mmol/L 26 25 27   BUN mg/dL 11 11 11   CREATININE mg/dL 0 67 0 74 0 71   GLUCOSE RANDOM mg/dL 105 91 83   CALCIUM mg/dL 9 7 8 6 9 0       Hospital Data:    10/13:  CT brain:  1  No acute intracranial abnormality  2  Hyperdensity in the right basal ganglia representing a focus of calcification    10/13:  CT brain:  1  Tiny 1 mm hyperdensity identified in the right basal ganglia likely represents focus of calcification and not acute hemorrhage  I have no priors for comparison  Follow-up noncontrast head CT is recommended in 3-4 hours to assure stability  No other   areas of concern for acute intracranial abnormality  2  Volume loss/atrophy and white matter microangiopathy  10/13:  Chest x-ray:  No acute disease    Echocardiogram:  Pending    Carotid ultrasound:  Pending    Urinalysis:  Negative nitrates, leukocyte esterase           ---------------------------------------------------------------------------------------------------------------  This note has been constructed using a voice recognition system

## 2017-10-16 NOTE — PROGRESS NOTES
Received order from rapid response NP for IV metoprolol  Given at 0726  Patient now in sinus rhythm/sinus tachycardia  Heart rate 100  Patient is feeling much better  Dr Salinas Whittaker aware of afib this morning  Will monitor

## 2017-10-16 NOTE — PLAN OF CARE
Problem: DISCHARGE PLANNING - CARE MANAGEMENT  Goal: Discharge to post-acute care or home with appropriate resources  INTERVENTIONS:  - Conduct assessment to determine patient/family and health care team treatment goals, and need for post-acute services based on payer coverage, community resources, and patient preferences, and barriers to discharge  - Address psychosocial, clinical, and financial barriers to discharge as identified in assessment in conjunction with the patient/family and health care team  - Arrange appropriate level of post-acute services according to patients   needs and preference and payer coverage in collaboration with the physician and health care team  - Communicate with and update the patient/family, physician, and health care team regarding progress on the discharge plan  - Arrange appropriate transportation to post-acute venues  Outcome: Progressing  Referral sent to [facility] for [address] [facility] to follow up

## 2017-10-16 NOTE — PROGRESS NOTES
Called rapid response NP as patient's heart rate is in 140's uncontrolled afib  BP elevated  Patient is symptomatic  She feels very weak and states that she feels worse than when she came in  Rapid response instructed to call SLIM  SLIM paged, no return call

## 2017-10-16 NOTE — SOCIAL WORK
CM met with patient at bedside to address d/c plan  Patient lives in an apartment at the [facility] with her   She is independnet with ADLs, uses cane and rw, has hx of VNA, unsure of agency  Patient does not drive, but will have transportation home  Has familial support and rx coverage, gets meds through [facility] (RN for [facility])  PCP is Dr Elie Charles  PT recommending HHPT vs  STR for patient  Patient feels more comfortable with STR, prefers [facility]  Referral sent  CM to follow

## 2017-10-16 NOTE — PHYSICIAN ADVISOR
Patient was admitted with a hypertensive crisis, headache, weakness and a syncopal event  Her planned work-up includes an in-patient cardiology evaluation with possible including a carotid doppler study and a 2D echocardiogram   She is to also have an in-patient neurology consult  A 2 night admission to the hospital should be considered appropriate

## 2017-10-16 NOTE — PLAN OF CARE
Problem: PHYSICAL THERAPY ADULT  Goal: Performs mobility at highest level of function for planned discharge setting  See evaluation for individualized goals  Treatment/Interventions: Functional transfer training, LE strengthening/ROM, Therapeutic exercise, Endurance training, Patient/family training, Bed mobility, Gait training, Spoke to nursing  Equipment Recommended: [place] [employer] (SRAVAN)       See flowsheet documentation for full assessment, interventions and recommendations  Outcome: Progressing  Prognosis: Fair  Problem List: Decreased strength, Decreased range of motion, Decreased endurance, Impaired balance, Decreased mobility  Assessment: Pt  seated at EOB upon my arrival  Reports amb  with her daughter in room this AM with no complaints  Reports increased fatigue this PM  Able to complete all transfers practicing proper technique with no noted LOB  Progressed with increased amb  trial with use of RW and Alfonso of therapist  Nora Bridges by increased fatigue requiring a quick return to bed  Repositioned supine in bed at end of treatment session  Pt  will continue to progress with PT goals to ensure a safe d/c home with Home PT and family support as needed when medically stable  Barriers to Discharge: Other (Comment) (medical status)     Recommendation: Home PT, Home with family support     PT - OK to Discharge: No (Continued progression of PT goals )    See flowsheet documentation for full assessment

## 2017-10-17 ENCOUNTER — GENERIC CONVERSION - ENCOUNTER (OUTPATIENT)
Dept: OTHER | Facility: OTHER | Age: 82
End: 2017-10-17

## 2017-10-17 LAB
ANION GAP SERPL CALCULATED.3IONS-SCNC: 8 MMOL/L (ref 4–13)
BUN SERPL-MCNC: 15 MG/DL (ref 5–25)
CALCIUM SERPL-MCNC: 8.9 MG/DL (ref 8.3–10.1)
CHLORIDE SERPL-SCNC: 102 MMOL/L (ref 100–108)
CO2 SERPL-SCNC: 27 MMOL/L (ref 21–32)
CREAT SERPL-MCNC: 0.65 MG/DL (ref 0.6–1.3)
ERYTHROCYTE [DISTWIDTH] IN BLOOD BY AUTOMATED COUNT: 14.1 % (ref 11.6–15.1)
GFR SERPL CREATININE-BSD FRML MDRD: 81 ML/MIN/1.73SQ M
GLUCOSE SERPL-MCNC: 96 MG/DL (ref 65–140)
HCT VFR BLD AUTO: 35.7 % (ref 34.8–46.1)
HGB BLD-MCNC: 12 G/DL (ref 11.5–15.4)
MCH RBC QN AUTO: 31.1 PG (ref 26.8–34.3)
MCHC RBC AUTO-ENTMCNC: 33.6 G/DL (ref 31.4–37.4)
MCV RBC AUTO: 93 FL (ref 82–98)
PLATELET # BLD AUTO: 157 THOUSANDS/UL (ref 149–390)
PMV BLD AUTO: 9.5 FL (ref 8.9–12.7)
POTASSIUM SERPL-SCNC: 4 MMOL/L (ref 3.5–5.3)
RBC # BLD AUTO: 3.86 MILLION/UL (ref 3.81–5.12)
SODIUM SERPL-SCNC: 137 MMOL/L (ref 136–145)
WBC # BLD AUTO: 6.21 THOUSAND/UL (ref 4.31–10.16)

## 2017-10-17 PROCEDURE — 97110 THERAPEUTIC EXERCISES: CPT

## 2017-10-17 PROCEDURE — 85027 COMPLETE CBC AUTOMATED: CPT | Performed by: INTERNAL MEDICINE

## 2017-10-17 PROCEDURE — 80048 BASIC METABOLIC PNL TOTAL CA: CPT | Performed by: INTERNAL MEDICINE

## 2017-10-17 PROCEDURE — 97116 GAIT TRAINING THERAPY: CPT

## 2017-10-17 RX ORDER — DOCUSATE SODIUM 100 MG/1
100 CAPSULE, LIQUID FILLED ORAL 2 TIMES DAILY
Status: DISCONTINUED | OUTPATIENT
Start: 2017-10-17 | End: 2017-10-20 | Stop reason: HOSPADM

## 2017-10-17 RX ORDER — POLYETHYLENE GLYCOL 3350 17 G/17G
17 POWDER, FOR SOLUTION ORAL ONCE
Status: COMPLETED | OUTPATIENT
Start: 2017-10-17 | End: 2017-10-17

## 2017-10-17 RX ORDER — AMIODARONE HYDROCHLORIDE 200 MG/1
200 TABLET ORAL
Status: DISCONTINUED | OUTPATIENT
Start: 2017-10-17 | End: 2017-10-20 | Stop reason: HOSPADM

## 2017-10-17 RX ADMIN — ASPIRIN 81 MG: 81 TABLET, COATED ORAL at 08:21

## 2017-10-17 RX ADMIN — HEPARIN SODIUM 5000 UNITS: 5000 INJECTION, SOLUTION INTRAVENOUS; SUBCUTANEOUS at 05:18

## 2017-10-17 RX ADMIN — DOCUSATE SODIUM 100 MG: 100 CAPSULE, LIQUID FILLED ORAL at 21:25

## 2017-10-17 RX ADMIN — METOPROLOL TARTRATE 25 MG: 25 TABLET ORAL at 08:21

## 2017-10-17 RX ADMIN — LEVOTHYROXINE SODIUM 37.5 MCG: 75 TABLET ORAL at 05:18

## 2017-10-17 RX ADMIN — AMLODIPINE BESYLATE 5 MG: 5 TABLET ORAL at 08:21

## 2017-10-17 RX ADMIN — SERTRALINE HYDROCHLORIDE 25 MG: 25 TABLET ORAL at 08:21

## 2017-10-17 RX ADMIN — METOPROLOL TARTRATE 25 MG: 25 TABLET ORAL at 21:25

## 2017-10-17 RX ADMIN — POLYETHYLENE GLYCOL 3350 17 G: 17 POWDER, FOR SOLUTION ORAL at 21:25

## 2017-10-17 RX ADMIN — AMIODARONE HYDROCHLORIDE 200 MG: 200 TABLET ORAL at 11:27

## 2017-10-17 RX ADMIN — FLUTICASONE PROPIONATE 1 SPRAY: 50 SPRAY, METERED NASAL at 08:22

## 2017-10-17 RX ADMIN — HEPARIN SODIUM 5000 UNITS: 5000 INJECTION, SOLUTION INTRAVENOUS; SUBCUTANEOUS at 21:26

## 2017-10-17 RX ADMIN — HEPARIN SODIUM 5000 UNITS: 5000 INJECTION, SOLUTION INTRAVENOUS; SUBCUTANEOUS at 13:28

## 2017-10-17 RX ADMIN — AMIODARONE HYDROCHLORIDE 200 MG: 200 TABLET ORAL at 17:23

## 2017-10-17 NOTE — PLAN OF CARE
Problem: PHYSICAL THERAPY ADULT  Goal: Performs mobility at highest level of function for planned discharge setting  See evaluation for individualized goals  Treatment/Interventions: Functional transfer training, LE strengthening/ROM, Therapeutic exercise, Endurance training, Patient/family training, Bed mobility, Gait training, Spoke to nursing  Equipment Recommended: Stephany Seals (SRAVAN)       See flowsheet documentation for full assessment, interventions and recommendations  Outcome: Progressing  Prognosis: Good  Problem List: Decreased range of motion, Decreased endurance, Impaired balance, Decreased mobility, Decreased strength  Assessment: Pt showing improved ability to perform transfers and ambulation  Less assistance for all aspects of mobility  NO gross lob noted with transfers and ambulation on levels with use of Rw  Pt progressed with ambulation distances to 150'x2,one standing rest required due to fatigue  PT performed seated b/l l arom exercises without difficulty, verbal cues required for correct exercise techniques  Recommend d/c to home with family support and HHPT  Barriers to Discharge: Other (Comment) (medical status)     Recommendation: Home PT, Home with family support     PT - OK to Discharge: Yes (to home when medically cleared)    See flowsheet documentation for full assessment

## 2017-10-17 NOTE — PROGRESS NOTES
Progress Note - Cardiology   Fritz Coelho 80 y o  female MRN: [id number]  Unit/Bed#: E4 -01 Encounter: [id number]      Assessment:     1  Paroxysmal atrial fibrillation:  Reviewed on telemetry  2  Syncope, unclear etiology, possibly secondary to rapid ventricular response from atrial fibrillation  3  Hypertension  4  Hypothyroidism    Discussion/Recommendations:    · In light of syncopal episode as presentation, fairly rapid ventricular response during times of atrial fibrillation, will initiate amiodarone 200 mg 3 times daily  Reviewed indications, risks, benefits including potential adverse drug reactions and patient agreeable to initiate  · Continue metoprolol and amlodipine for now  · Discussed increased risk of CVA in setting of paroxysmal atrial fibrillation  Patient kindly refusing anticoagulation at this time  Reasonable to hold for now, and continue aspirin only in light of recent syncopal episode to ensure no evidence of recurrence  If remains symptom-free after discharge, patient agreeable to further discussion  Subjective:  Patient seen and examined, feels well without lightheadedness, dizziness, shortness of breath, chest pain        Physical Exam:  GEN:  NAD  HEENT:  MMM, NCAT, pink conjunctiva, EOMI, nonicteric sclera  CV:  NO JVD/HJR, RR, NO M/R/G, +S1/S2, NO PARASTERNAL HEAVE/THRILL, NO LE EDEMA, NO HEPATIC SYSTOLIC PULSATION, WARM EXTREMITIES  RESP:  CTAB/L  ABD:  SOFT, NT, NO GROSS ORGANOMEGALY        Vitals:   /72   Pulse 64   Temp (!) 96 2 °F (35 7 °C) (Tympanic)   Resp 18   Ht 5' (1 524 m)   Wt 45 4 kg (100 lb)   SpO2 99%   BMI 19 53 kg/m²   Vitals:    10/13/17 1615 10/13/17 2343   Weight: 43 1 kg (95 lb) 45 4 kg (100 lb)       Intake/Output Summary (Last 24 hours) at 10/17/17 1008  Last data filed at 10/17/17 0901   Gross per 24 hour   Intake              400 ml   Output             1100 ml   Net             -700 ml         TELEMETRY:  Paroxysmal atrial fibrillation  Lab Results:    Results from last 7 days  Lab Units 10/17/17  0429   WBC Thousand/uL 6 21   HEMOGLOBIN g/dL 12 0   HEMATOCRIT % 35 7   PLATELETS Thousands/uL 157       Results from last 7 days  Lab Units 10/17/17  0429  10/13/17  1654   SODIUM mmol/L 137  < > 137   POTASSIUM mmol/L 4 0  < > 3 9   CHLORIDE mmol/L 102  < > 99*   CO2 mmol/L 27  < > 31   BUN mg/dL 15  < > 16   CREATININE mg/dL 0 65  < > 0 78   CALCIUM mg/dL 8 9  < > 9 8   TOTAL PROTEIN g/dL  --   --  7 7   BILIRUBIN TOTAL mg/dL  --   --  0 44   ALK PHOS U/L  --   --  75   ALT U/L  --   --  30   AST U/L  --   --  23   GLUCOSE RANDOM mg/dL 96  < > 97   < > = values in this interval not displayed      Results from last 7 days  Lab Units 10/17/17  0429   SODIUM mmol/L 137   POTASSIUM mmol/L 4 0   CHLORIDE mmol/L 102   CO2 mmol/L 27   BUN mg/dL 15   CREATININE mg/dL 0 65   GLUCOSE RANDOM mg/dL 96   CALCIUM mg/dL 8 9             Medications:    Current Facility-Administered Medications:     acetaminophen (TYLENOL) tablet 650 mg, 650 mg, Oral, Q6H PRN, Delmi Knight PA-C, 650 mg at 10/14/17 2352    ALPRAZolam (XANAX) tablet 0 25 mg, 0 25 mg, Oral, TID PRN, [facility], DO    amLODIPine (NORVASC) tablet 5 mg, 5 mg, Oral, Daily, Delmi Knight PA-C, 5 mg at 10/17/17 0821    aspirin (ECOTRIN LOW STRENGTH) EC tablet 81 mg, 81 mg, Oral, Daily, Brayan Zhu MD, 81 mg at 10/17/17 0821    fluticasone (FLONASE) 50 mcg/act nasal spray 1 spray, 1 spray, Each Nare, Daily, Delmi Knight PA-C, 1 spray at 10/17/17 0822    heparin (porcine) subcutaneous injection 5,000 Units, 5,000 Units, Subcutaneous, Q8H [address], 5,000 Units at 10/17/17 0518 **AND** Platelet count, , , Once, Nadia Ritchie MD    hydrALAZINE (APRESOLINE) injection 5 mg, 5 mg, Intravenous, Q6H PRN, Delmi Knight PA-C    ketorolac (TORADOL) 30 mg/mL injection 15 mg, 15 mg, Intravenous, Q12H PRN, Brayan Zhu MD    levothyroxine tablet 37 5 mcg, 37 5 mcg, Oral, Early Morning, Thompson Jaramillo BRIGIDA Knight, 37 5 mcg at 10/17/17 0518    metoprolol tartrate (LOPRESSOR) tablet 25 mg, 25 mg, Oral, Q12H [address], Iftikhar Forrest MD, 25 mg at 10/17/17 0821    ondansetron (ZOFRAN) injection 4 mg, 4 mg, Intravenous, Q6H PRN, Sayda Baumann MD    sertraline (ZOLOFT) tablet 25 mg, 25 mg, Oral, Daily, Nadia Rudy Kumar MD, 25 mg at 10/17/17 1800    Portions of the record may have been created with voice recognition software  Occasional wrong word or "sound a like" substitutions may have occurred due to the inherent limitations of voice recognition software  Read the chart carefully and recognize, using context, where substitutions have occurred

## 2017-10-17 NOTE — PHYSICAL THERAPY NOTE
Physical Therapy Treatment Note     10/17/17 1718   Pain Assessment   Pain Assessment No/denies pain   Pain Score No Pain   Restrictions/Precautions   Other Precautions Telemetry; Fall Risk   General   Chart Reviewed Yes   Response to Previous Treatment Patient with no complaints from previous session  Family/Caregiver Present No   Cognition   Overall Cognitive Status WFL   Arousal/Participation Alert   Attention Within functional limits   Orientation Level Oriented X4   Following Commands Follows multistep commands without difficulty   Subjective   Subjective PT agreeable to therapy  pt reports walking in room with rw with daughter earlier today  Transfers   Sit to Stand 5  Supervision   Additional items Assist x 1; Armrests   Stand to Sit 5  Supervision   Additional items Assist x 1; Armrests   Ambulation/Elevation   Gait pattern (reciprocal gait pattern, mild steppage gait l le)   Gait Assistance 5  Supervision   Additional items Assist x 1   Assistive Device Rolling walker   Distance 150'x2   Balance   Static Sitting Good   Dynamic Sitting Fair +   Static Standing Fair +   Dynamic Standing Fair   Ambulatory Fair +   Activity Tolerance   Activity Tolerance Patient tolerated treatment well   Exercises   Hip Abduction Sitting;AROM; Bilateral  (x 12 reps  )   Hip Adduction Sitting;AROM; Bilateral  (x 12 reps )   Knee AROM [employer]; Bilateral  (x 12 reps  )   Ankle Pumps Sitting;AROM; Bilateral  (x 12 reps  limited L le due to foot drop  )   Marching Sitting;AROM; Bilateral  (x 12 reps   )   Assessment   Prognosis Good   Problem List Decreased range of motion;Decreased endurance; Impaired balance;Decreased mobility; Decreased strength   Assessment Pt showing improved ability to perform transfers and ambulation  Less assistance for all aspects of mobility  NO gross lob noted with transfers and ambulation on levels with use of Rw    Pt progressed with ambulation distances to 150'x2,one standing rest required due to fatigue  PT performed seated b/l l arom exercises without difficulty, verbal cues required for correct exercise techniques  Recommend d/c to home with family support and HHPT  Goals   Patient Goals to go home  STG Expiration Date 10/25/17   Treatment Day 2   Plan   Treatment/Interventions Functional transfer training;LE strengthening/ROM; Therapeutic exercise; Endurance training;Patient/family training;Equipment eval/education; Bed mobility;Gait training   Progress Progressing toward goals   PT Frequency 5x/wk   Recommendation   Recommendation Home PT; Home with family support   PT - OK to Discharge Yes  (to home when medically cleared)         Jorge South, PTA

## 2017-10-17 NOTE — PLAN OF CARE
Problem: Potential for Falls  Goal: Patient will remain free of falls  INTERVENTIONS:  - Assess patient frequently for physical needs  -  Identify cognitive and physical deficits and behaviors that affect risk of falls    -  [place] fall precautions as indicated by assessment   - Educate patient/family on patient safety including physical limitations  - Instruct patient to call for assistance with activity based on assessment  - Modify environment to reduce risk of injury  - Consider OT/PT consult to assist with strengthening/mobility   Outcome: Progressing      Problem: PAIN - ADULT  Goal: Verbalizes/displays adequate comfort level or baseline comfort level  Interventions:  - Encourage patient to monitor pain and request assistance  - Assess pain using appropriate pain scale  - Administer analgesics based on type and severity of pain and evaluate response  - Implement non-pharmacological measures as appropriate and evaluate response  - Consider cultural and social influences on pain and pain management  - Notify physician/advanced practitioner if interventions unsuccessful or patient reports new pain   Outcome: Progressing      Problem: INFECTION - ADULT  Goal: Absence or prevention of progression during hospitalization  INTERVENTIONS:  - Assess and monitor for signs and symptoms of infection  - Monitor lab/diagnostic results  - Monitor all insertion sites, i e  indwelling lines, tubes, and drains  - [place] appropriate cooling/warming therapies per order  - Administer medications as ordered  - Instruct and encourage patient and family to use good hand hygiene technique  - Identify and instruct in appropriate isolation precautions for identified infection/condition   Outcome: Progressing    Goal: Absence of fever/infection during neutropenic period  INTERVENTIONS:  - Monitor WBC  - Implement neutropenic guidelines   Outcome: Progressing      Problem: SAFETY ADULT  Goal: Maintain or return to baseline ADL function  INTERVENTIONS:  -  Assess patient's ability to carry out ADLs; assess patient's baseline for ADL function and identify physical deficits which impact ability to perform ADLs (bathing, care of mouth/teeth, toileting, grooming, dressing, etc )  - Assess/evaluate cause of self-care deficits   - Assess range of motion  - Assess patient's mobility; develop plan if impaired  - Assess patient's need for assistive devices and provide as appropriate  - Encourage maximum independence but intervene and supervise when necessary  ¯ Involve family in performance of ADLs  ¯ Assess for home care needs following discharge   ¯ Request OT consult to assist with ADL evaluation and planning for discharge  ¯ Provide patient education as appropriate   Outcome: Progressing    Goal: Maintain or return mobility status to optimal level  INTERVENTIONS:  - Assess patient's baseline mobility status (ambulation, transfers, stairs, etc )    - Identify cognitive and physical deficits and behaviors that affect mobility  - Identify mobility aids required to assist with transfers and/or ambulation (gait belt, sit-to-stand, lift, walker, cane, etc )  - [place] fall precautions as indicated by assessment  - Record patient progress and toleration of activity level on Mobility SBAR; progress patient to next Phase/Stage  - Instruct patient to call for assistance with activity based on assessment  - Request Rehabilitation consult to assist with strengthening/weightbearing, etc    Outcome: Progressing      Problem: DISCHARGE PLANNING  Goal: Discharge to home or other facility with appropriate resources  INTERVENTIONS:  - Identify barriers to discharge w/patient and caregiver  - Arrange for needed discharge resources and transportation as appropriate  - Identify discharge learning needs (meds, wound care, etc )  - Arrange for interpretive services to assist at discharge as needed  - Refer to Case Management Department for coordinating discharge planning if the patient needs post-hospital services based on physician/advanced practitioner order or complex needs related to functional status, cognitive ability, or social support system   Outcome: Progressing      Problem: Knowledge Deficit  Goal: Patient/family/caregiver demonstrates understanding of disease process, treatment plan, medications, and discharge instructions  Complete learning assessment and assess knowledge base  Interventions:  - Provide teaching at level of understanding  - Provide teaching via preferred learning methods   Outcome: Progressing      Problem: NEUROSENSORY - ADULT  Goal: Achieves stable or improved neurological status  INTERVENTIONS  - Monitor and report changes in neurological status  - Initiate measures to prevent increased intracranial pressure  - Maintain blood pressure and fluid volume within ordered parameters to optimize cerebral perfusion  - Monitor temperature, glucose, and sodium or any other associated labs   Initiate appropriate interventions as ordered  - Monitor for seizure activity   - Administer anti-seizure medications as ordered   Outcome: Progressing    Goal: Achieves maximal functionality and self care  INTERVENTIONS  - Monitor swallowing and airway patency with patient fatigue and changes in neurological status  - Encourage and assist patient to increase activity and self care with guidance from rehab services  - Encourage visually impaired, hearing impaired and aphasic patients to use assistive/communication devices   Outcome: Progressing      Problem: CARDIOVASCULAR - ADULT  Goal: Maintains optimal cardiac output and hemodynamic stability  INTERVENTIONS:  - Monitor I/O, vital signs and rhythm  - Monitor for S/S and trends of decreased cardiac output i e  bleeding, hypotension  - Administer and titrate ordered vasoactive medications to optimize hemodynamic stability  - Assess quality of pulses, skin color and temperature  - Assess for signs of decreased with the patient/family and health care team  - Arrange appropriate level of post-acute services according to patients   needs and preference and payer coverage in collaboration with the physician and health care team  - Communicate with and update the patient/family, physician, and health care team regarding progress on the discharge plan  - Arrange appropriate transportation to post-acute venues   Outcome: Progressing

## 2017-10-17 NOTE — PLAN OF CARE
CARDIOVASCULAR - ADULT     Maintains optimal cardiac output and hemodynamic stability Progressing        DISCHARGE PLANNING     Discharge to home or other facility with appropriate resources Progressing        DISCHARGE PLANNING - CARE MANAGEMENT     Discharge to post-acute care or home with appropriate resources Progressing        INFECTION - ADULT     Absence or prevention of progression during hospitalization Progressing     Absence of fever/infection during neutropenic period Progressing        Knowledge Deficit     Patient/family/caregiver demonstrates understanding of disease process, treatment plan, medications, and discharge instructions Progressing        METABOLIC, FLUID AND ELECTROLYTES - ADULT     Electrolytes maintained within normal limits Progressing     Fluid balance maintained Progressing        NEUROSENSORY - ADULT     Achieves stable or improved neurological status Progressing     Achieves maximal functionality and self care Progressing        PAIN - ADULT     Verbalizes/displays adequate comfort level or baseline comfort level Progressing        Potential for Falls     Patient will remain free of falls Progressing        SAFETY ADULT     Maintain or return to baseline ADL function Progressing     Maintain or return mobility status to optimal level Progressing        SKIN/TISSUE INTEGRITY - ADULT     Skin integrity remains intact Progressing

## 2017-10-17 NOTE — PROGRESS NOTES
Progress Note - Bruce Blancas 80 y o  female MRN: [id number]    Unit/Bed#: E4 -01 Encounter: [id number]      Assessment/Plan:  1-new onset paroxysmal atrial fibrillation with rapid ventricular rate:  Patient was noted to have atrial fibrillation with a rapid ventricular rate  She was given 5 mg of IV metoprolol, return to normal sinus rhythm              -she was started on p o  metoprolol for rate control              -she was evaluated by the cardiology service who noted that she had a rapid ventricular response while she was in atrial fibrillation  They recommended amiodarone 200 mg t i d  -2D echocardiogram without significant valvular heart disease  She had mild-moderate tricuspid regurgitation  No significant aortic stenosis  Mild mitral regurgitation                         -continue telemetry                        -TSH noted to be elevated, (patient with h/o hypothyroidism on synthroid)                        -Eusebia Vasc 2 score is 4:   patient however not amenable to anticoagulation at this time                                     2-syncope:  Patient presented after a syncopal episode last wed  She felt generalized weakness when she was standing  She then called her , and asked him to assist her in walking to the bed  She notes however she blacked out  When she awakened she was lying on her stomach on the floor, and not yet reached the bed  She and her  estimated she was unresponsive for half of a minute  There was no postictal state  There was no seizure-like activity witnessed or urinary incontinence  Patient denies any associated new weakness, numbness, balance problems, visual changes  She noted a posterior headache    She denied any chest pain, chest pressure, or palpitations associated with that episode                         -Patient is undergoing evaluation for the etiology of her syncope                            A) neurologic: Patient's CT scan of the brain did not reveal any acute changes  She has no focal deficits on exam consistent with acute infarct  She however has had persistent sensation of headache as well as weakness in her head since that time  she was evaluated by the neurology service who did not feel this was secondary to an acute neurologic event or neurologic etiology  -patient had a CT scan of her brain without any acute abnormality  She had a carotid ultrasound without significant stenosis                           B) cardiology:  Patient's initial troponin did not reveal any evidence of ischemia  -echocardiogram without significant valvular heart disease/aortic stenosis  -patient did not have orthostatic hypotension    -symptoms most likely secondary to atrial fibrillation with rapid ventricular rate, and subsequent cerebral hypoperfusion      3-hypertensive urgency:  Patient initially presented with elevated blood pressure  her medication regimen has been titrated with improved control  Her blood pressure today has predominantly ranged a systolic of 660-744C     4-status post headache:   associated with her syncopal episode  This is likely secondary to atrial fibrillation with rapid ventricular rate      5-hypothyroidism:  Patient's initial TSH was elevated at 8  Her Synthroid was increased to 37 5 mcg  Continue to monitor                         -outpatient TSH in 6 weeks      6-hyperlipidemia:  On diet therapy      7-incidental finding:  Admission CT scan revealed a 1 mm hyperdensity in the right basal ganglia  Repeat CT scan noted this was calcification, and clarified there is no evidence of hemorrhage      8-depression:  Continue home Zoloft     9-incidental finding: Thrombocytopenia:  patient had a transient reading of thrombocytopenia on 10/15 with a platelet count of 970    This is most likely spurious as her current platelet count and previous platelet counts were normal      10-family:  Called daughter Gus Watson and gave update      VTE Pharmacologic Prophylaxis: Heparin  VTE Mechanical Prophylaxis: sequential compression device    D/w dr fraire      Certification Statement: The patient will continue to require additional inpatient hospital stay due to need for further acute intervention for a fib    Status: inpatient     ===================================================================    Subjective:  Patient relates that she feels better today  She denies any headache  She denies any current pain or discomfort anywhere at all  She notes she ambulated today  She denies any weak sensation in her head  She denies any focal weakness  She denies any dizziness or lightheadedness  She denies any syncopal episode  She denies any headache, chest pain, abdominal pain  She denies any shortness of breath or cough  She notes she has been ambulating without any dizziness, lightheadedness, or other problems  She denies any fever or chills  She denies any nausea, vomiting, diarrhea  She notes she has not passed a bowel movement in 2 days and request both a stool softener and a laxative  Physical Exam:   Temp:  [96 °F (35 6 °C)-98 3 °F (36 8 °C)] 97 5 °F (36 4 °C)  HR:  [58-68] 66  Resp:  [16-18] 18  BP: (121-158)/(63-81) 146/67    Gen:  Pleasant, non-tachypnic, non-dyspnic  Conversant  Ambulating in her room  Heart: regular rate and rhythm, S1S2 present, no murmur, rub or gallop  Lungs: clear to ausculatation bilaterally  No wheezing, crackles, or rhonchi  No accessory muscle use or respiratory distress  Abd: soft, non-tender, non-distended  NABS, no guarding, rebound or peritoneal signs  Extremities: no clubbing, cyanosis or edema  2+pedal pulses bilaterally  Neuro: awake, alert  Fluent and goal directed speech  Moving all 4 extremities  Steady gait observed  Skin: warm and dry: no petechiae, purpura and rash      LABS:     Results from last 7 days  Lab Units 10/17/17  0429 10/15/17  0438 10/14/17  0505   WBC Thousand/uL 6 21 4 97 5 05   HEMOGLOBIN g/dL 12 0 10 9* 12 2   HEMATOCRIT % 35 7 32 6* 36 0   PLATELETS Thousands/uL 157 129* 172       Results from last 7 days  Lab Units 10/17/17  0429 10/16/17  0459 10/15/17  0438   SODIUM mmol/L 137 136 135*   POTASSIUM mmol/L 4 0 3 7 3 8   CHLORIDE mmol/L 102 104 106   CO2 mmol/L 27 26 25   BUN mg/dL 15 11 11   CREATININE mg/dL 0 65 0 67 0 74   GLUCOSE RANDOM mg/dL 96 105 91   CALCIUM mg/dL 8 9 9 7 8 6       Hospital Data:    10/13:  CT brain:  1  No acute intracranial abnormality  2  Hyperdensity in the right basal ganglia representing a focus of calcification     10/13:  CT brain:  1  Tiny 1 mm hyperdensity identified in the right basal ganglia likely represents focus of calcification and not acute hemorrhage  I have no priors for comparison  Follow-up noncontrast head CT is recommended in 3-4 hours to assure stability  No other   areas of concern for acute intracranial abnormality  2  Volume loss/atrophy and white matter microangiopathy      10/13:  Chest x-ray:  No acute disease     Echocardiogram:   left ventricular ejection fraction 60%  No regional wall motion abnormality  Right atrium mildly dilated  Mild-moderate tricuspid regurgitation      Carotid ultrasound:  No significant stenosis bilaterally      Urinalysis:  Negative nitrates, leukocyte esterase         ---------------------------------------------------------------------------------------------------------------  This note has been constructed using a voice recognition system

## 2017-10-18 ENCOUNTER — GENERIC CONVERSION - ENCOUNTER (OUTPATIENT)
Dept: OTHER | Facility: OTHER | Age: 82
End: 2017-10-18

## 2017-10-18 LAB
TROPONIN I SERPL-MCNC: <0.02 NG/ML

## 2017-10-18 PROCEDURE — 97530 THERAPEUTIC ACTIVITIES: CPT

## 2017-10-18 PROCEDURE — 97110 THERAPEUTIC EXERCISES: CPT

## 2017-10-18 PROCEDURE — 93005 ELECTROCARDIOGRAM TRACING: CPT | Performed by: INTERNAL MEDICINE

## 2017-10-18 PROCEDURE — 93005 ELECTROCARDIOGRAM TRACING: CPT

## 2017-10-18 PROCEDURE — 84484 ASSAY OF TROPONIN QUANT: CPT | Performed by: INTERNAL MEDICINE

## 2017-10-18 PROCEDURE — 97116 GAIT TRAINING THERAPY: CPT

## 2017-10-18 RX ORDER — LORAZEPAM 2 MG/ML
0.5 INJECTION INTRAMUSCULAR ONCE AS NEEDED
Status: DISCONTINUED | OUTPATIENT
Start: 2017-10-18 | End: 2017-10-20 | Stop reason: HOSPADM

## 2017-10-18 RX ADMIN — HEPARIN SODIUM 5000 UNITS: 5000 INJECTION, SOLUTION INTRAVENOUS; SUBCUTANEOUS at 06:18

## 2017-10-18 RX ADMIN — HEPARIN SODIUM 5000 UNITS: 5000 INJECTION, SOLUTION INTRAVENOUS; SUBCUTANEOUS at 15:18

## 2017-10-18 RX ADMIN — ASPIRIN 81 MG: 81 TABLET, COATED ORAL at 09:38

## 2017-10-18 RX ADMIN — LEVOTHYROXINE SODIUM 37.5 MCG: 75 TABLET ORAL at 06:18

## 2017-10-18 RX ADMIN — HEPARIN SODIUM 5000 UNITS: 5000 INJECTION, SOLUTION INTRAVENOUS; SUBCUTANEOUS at 21:37

## 2017-10-18 RX ADMIN — SERTRALINE HYDROCHLORIDE 25 MG: 25 TABLET ORAL at 09:38

## 2017-10-18 RX ADMIN — AMIODARONE HYDROCHLORIDE 200 MG: 200 TABLET ORAL at 17:43

## 2017-10-18 RX ADMIN — DOCUSATE SODIUM 100 MG: 100 CAPSULE, LIQUID FILLED ORAL at 17:43

## 2017-10-18 RX ADMIN — AMIODARONE HYDROCHLORIDE 200 MG: 200 TABLET ORAL at 12:08

## 2017-10-18 RX ADMIN — AMIODARONE HYDROCHLORIDE 200 MG: 200 TABLET ORAL at 09:38

## 2017-10-18 RX ADMIN — METOPROLOL TARTRATE 25 MG: 25 TABLET ORAL at 21:37

## 2017-10-18 RX ADMIN — FLUTICASONE PROPIONATE 1 SPRAY: 50 SPRAY, METERED NASAL at 09:37

## 2017-10-18 RX ADMIN — METOPROLOL TARTRATE 25 MG: 25 TABLET ORAL at 09:37

## 2017-10-18 RX ADMIN — AMLODIPINE BESYLATE 5 MG: 5 TABLET ORAL at 09:37

## 2017-10-18 NOTE — PHYSICAL THERAPY NOTE
Physical Therapy Progress Note     10/18/17 1014   Pain Assessment   Pain Assessment No/denies pain   Pain Score No Pain   Restrictions/Precautions   Weight Bearing Precautions Per Order No   Other Precautions Telemetry   General   Chart Reviewed Yes   Response to Previous Treatment Patient with no complaints from previous session  Family/Caregiver Present No   Subjective   Subjective Willing to participate in therapy this AM   Transfers   Sit to Stand 5  Supervision   Additional items Assist x 1; Armrests; Increased time required;Verbal cues   Stand to Sit 5  Supervision   Additional items Assist x 1; Armrests; Increased time required;Verbal cues   Ambulation/Elevation   Gait pattern Steppage  (reciprocal gait pattern, LLE steppage)   Gait Assistance 5  Supervision   Additional items Assist x 1; Tactile cues; Verbal cues   Assistive Device Rolling walker   Distance 220'   Stair Management Assistance Not tested   Balance   Static Sitting Good   Dynamic Sitting Fair +   Static Standing Fair +   Dynamic Standing Fair   Ambulatory Fair   Endurance Deficit   Endurance Deficit No   Activity Tolerance   Activity Tolerance Patient tolerated treatment well   Nurse Made Aware Yes   Exercises   TKR Sitting;10 reps;AROM; Bilateral   Assessment   Prognosis Good   Problem List Decreased strength;Decreased range of motion;Decreased endurance; Impaired balance;Decreased mobility   Assessment Pt  seated in bedside chair upon my arrival  Able to complete all transfers during treatment session practicing proper technique with no noted LOB  Progressed with increased amb  trial with use of RW and standbyA of therapist  After additional amb  trial return to room with repositioning seated in bedside chair  Continue to recommend amb  trials with family while remaining in hospital for continued progression of functional mobililty   Pt  has completed all her PT goals and is safe for d/c home with Home PT and family support as needed when medically stable  Barriers to Discharge None   Goals   Patient Goals To go home  STG Expiration Date 10/25/17   Treatment Day 3   Plan   Treatment/Interventions Functional transfer training;LE strengthening/ROM; Endurance training; Therapeutic exercise;Gait training;Spoke to nursing;Spoke to case management   Progress Improving as expected   PT Frequency 5x/wk   Recommendation   Recommendation Home PT; Home with family support   Equipment Recommended Other (Comment)  (has rollator)   PT - OK to Discharge Yes  (if d/c when medically stable )     Emmanuel Garcia, WENDY

## 2017-10-18 NOTE — PROGRESS NOTES
Progress Note - Bethany Cheadle 80 y o  female MRN: [id number]    Unit/Bed#: E4 -01 Encounter: [id number]      Assessment/Plan:  1-new onset paroxysmal atrial fibrillation with rapid ventricular rate:  Patient was noted to have atrial fibrillation with a rapid ventricular rate   She was given 5 mg of IV metoprolol, return to normal sinus rhythm                                   -she was started on p o  metoprolol for rate control                                   -she was evaluated by the cardiology service who noted that she had a rapid ventricular response while she was in atrial fibrillation  They recommended amiodarone 200 mg t i d  Maria E Mcnair                       -5U echocardiogram without significant valvular heart disease  She had mild-moderate tricuspid regurgitation  No significant aortic stenosis    Mild mitral regurgitation                         -continue telemetry                        -TSH noted to be elevated, (patient with h/o hypothyroidism on synthroid)                        -Emeterio Vasc 2 score is 4:   patient however not amenable to anticoagulation at this time                                     2-syncope:  Patient presented after a syncopal episode last wed   She felt generalized weakness when she was standing   She then called her , and asked him to assist her in walking to the bed   She notes however she blacked out  Kash Leys she awakened she was lying on her stomach on the floor, and not yet reached the bed   She and her  estimated she was unresponsive for half of a minute   There was no postictal state   There was no seizure-like activity witnessed or urinary incontinence   Patient denies any associated new weakness, numbness, balance problems, visual changes   She noted a posterior headache   She denied any chest pain, chest pressure, or palpitations associated with that episode                         -Patient is undergoing evaluation for the etiology of her syncope                            A) neurologic:  Patient's CT scan of the brain did not reveal any acute changes   She has no focal deficits on exam consistent with acute infarct   She however has had persistent sensation of headache as well as weakness in her head since that time    she was evaluated by the neurology service who did not feel this was secondary to an acute neurologic event or neurologic etiology  -patient had a CT scan of her brain without any acute abnormality  She had a carotid ultrasound without significant stenosis                           B) cardiology:  Patient's initial troponin did not reveal any evidence of ischemia                        -[id number] without significant valvular heart disease/aortic stenosis                        -patient did not have orthostatic hypotension                        -symptoms most likely secondary to atrial fibrillation with rapid ventricular rate, and subsequent cerebral hypoperfusion      3-hypertensive urgency:  Patient initially presented with elevated blood pressure    her medication regimen has been titrated with improved control  Her blood pressure today has predominantly ranged a systolic of 589-060V     4-status post headache:    patient had episode of posterior head pressure radiating down both arms again today  She notes this episode also occurred prior to admission and prior to her syncopal episode  She did not have any associated visual changes, numbness, decreased sensation, or weakness  The episode spontaneously resolved after several minutes  She did have associated shortness of breath    -patient's case was discussed with the cardiology service  Her telemetry, EKG, and cardiac enzyme was reviewed  No evidence of ischemia  Additional stress testing not recommended    Continue monitor on telemetry and check serial cardiac enzymes   -case was discussed with the neurology service: This is not consistent with an acute neurologic syndrome/event  The recommend check an MRI of the cervical spine      5-hypothyroidism:  Patient's initial TSH was elevated at 8   Her Synthroid was increased to 37 5 mcg   Continue to monitor                         -outpatient TSH in 6 weeks      6-hyperlipidemia:  On diet therapy      7-incidental finding:  Admission CT scan revealed a 1 mm hyperdensity in the right basal ganglia   Repeat CT scan noted this was calcification, and clarified there is no evidence of hemorrhage      8-depression:  Continue home Zoloft     9-incidental finding:  Thrombocytopenia:  patient had a transient reading of thrombocytopenia on 10/15 with a platelet count of 030  This is most likely spurious as her current platelet count and previous platelet counts were normal      VTE Pharmacologic Prophylaxis: Heparin  VTE Mechanical Prophylaxis: sequential compression device    Certification Statement: The patient will continue to require additional inpatient hospital stay due to need for further acute intervention for headache    Status: inpatient     ===================================================================    Subjective:  Patient relates that she had an episode earlier today, similar to the prior to admission  This morning she felt at her baseline  She is ambulating without any difficulty  She took a nap this afternoon  She was awakened from sleep after approximately 30 minutes with sensation of heaviness and discomfort of the posterior aspect of her head  She noted the heaviness radiating down both arms  She notes she possibly had very mild heaviness in her chest   She noted associated difficulty breathing  The symptoms however spontaneously resolved shortly after  She notes she has had intermittent discomfort at the base of her neck since that time  Patient denies any associated visual changes, numbness, weakness, decreased sensation, dizziness      She denies any current chest tightness, chest heaviness, difficulty breathing, coughing, dyspnea on exertion  Physical Exam:   Temp:  [95 4 °F (35 2 °C)-99 1 °F (37 3 °C)] 98 3 °F (36 8 °C)  HR:  [53-66] 64  Resp:  [16-18] 18  BP: (116-169)/(56-75) 156/72    Gen:  Pleasant, non-tachypnic, non-dyspnic  Conversant  Heart: regular rate and rhythm, S1S2 present, no murmur, rub or gallop  Lungs: clear to ausculatation bilaterally  No wheezing, crackles, or rhonchi  No accessory muscle use or respiratory distress  Abd: soft, non-tender, non-distended  NABS, no guarding, rebound or peritoneal signs  Extremities: no clubbing, cyanosis or edema  2+pedal pulses bilaterally  Full range of motion  Neuro: awake, alert and oriented  Cranial nerves 2-12 intact  Strength and sensation grossly intact  Skin: warm and dry: no petechiae, purpura and rash  LABS:     Results from last 7 days  Lab Units 10/17/17  0429 10/15/17  0438 10/14/17  0505   WBC Thousand/uL 6 21 4 97 5 05   HEMOGLOBIN g/dL 12 0 10 9* 12 2   HEMATOCRIT % 35 7 32 6* 36 0   PLATELETS Thousands/uL 157 129* 172       Results from last 7 days  Lab Units 10/17/17  0429 10/16/17  0459 10/15/17  0438   SODIUM mmol/L 137 136 135*   POTASSIUM mmol/L 4 0 3 7 3 8   CHLORIDE mmol/L 102 104 106   CO2 mmol/L 27 26 25   BUN mg/dL 15 11 11   CREATININE mg/dL 0 65 0 67 0 74   GLUCOSE RANDOM mg/dL 96 105 91   CALCIUM mg/dL 8 9 9 7 8 6       Hospital Data:    10/13:  CT brain:  1  No acute intracranial abnormality  2  Hyperdensity in the right basal ganglia representing a focus of calcification     10/13:  CT brain:  1  Tiny 1 mm hyperdensity identified in the right basal ganglia likely represents focus of calcification and not acute hemorrhage  I have no priors for comparison  Follow-up noncontrast head CT is recommended in 3-4 hours to assure stability  No other   areas of concern for acute intracranial abnormality    2  Volume loss/atrophy and white matter microangiopathy      10/13:  Chest x-ray:  No acute disease     Echocardiogram:   left ventricular ejection fraction 60%  No regional wall motion abnormality  Right atrium mildly dilated  Mild-moderate tricuspid regurgitation      Carotid ultrasound:  No significant stenosis bilaterally      Urinalysis:  Negative nitrates, leukocyte esterase         ---------------------------------------------------------------------------------------------------------------  This note has been constructed using a voice recognition system

## 2017-10-18 NOTE — CASE MANAGEMENT
Continued Stay Review    Date: 10/17/2017    Vital Signs: /59   Pulse (!) 53   Temp (!) 95 6 °F (35 3 °C) (Tympanic)   Resp 18   Ht 5' (1 524 m)   Wt 45 4 kg (100 lb)   SpO2 100%   BMI 19 53 kg/m²     Medications:   Scheduled Meds:   amiodarone 200 mg Oral TID With Meals   amLODIPine 5 mg Oral Daily   aspirin 81 mg Oral Daily   docusate sodium 100 mg Oral BID   fluticasone 1 spray Each Nare Daily   heparin (porcine) 5,000 Units Subcutaneous Q8H [facility]   levothyroxine 37 5 mcg Oral Early Morning   metoprolol tartrate 25 mg Oral Q12H EVIE   sertraline 25 mg Oral Daily     Continuous Infusions:    PRN Meds:   acetaminophen    ALPRAZolam    hydrALAZINE    Abnormal Labs/Diagnostic Results:     Age/Sex: 80 y o  female     Assessment/Plan: 1-new onset paroxysmal atrial fibrillation with rapid ventricular rate:  Patient was noted to have atrial fibrillation with a rapid ventricular rate   She was given 5 mg of IV metoprolol, return to normal sinus rhythm                                   -she was started on p o  metoprolol for rate control                                   -she was evaluated by the cardiology service who noted that she had a rapid ventricular response while she was in atrial fibrillation  They recommended amiodarone 200 mg t i d  Luis Andrade                       -4H echocardiogram without significant valvular heart disease  She had mild-moderate tricuspid regurgitation  No significant aortic stenosis    Mild mitral regurgitation                         -continue telemetry                        -TSH noted to be elevated, (patient with h/o hypothyroidism on synthroid)                        -Emeterio Vasc 2 score is 4:   patient however not amenable to anticoagulation at this time                                     2-syncope:  Patient presented after a syncopal episode last wed   She felt generalized weakness when she was standing   She then called her , and asked him to assist her in walking to the bed   She notes however she blacked out  Genesis Leak she awakened she was lying on her stomach on the floor, and not yet reached the bed   She and her  estimated she was unresponsive for half of a minute   There was no postictal state   There was no seizure-like activity witnessed or urinary incontinence   Patient denies any associated new weakness, numbness, balance problems, visual changes   She noted a posterior headache   She denied any chest pain, chest pressure, or palpitations associated with that episode                         -Patient is undergoing evaluation for the etiology of her syncope                            A) neurologic:  Patient's CT scan of the brain did not reveal any acute changes   She has no focal deficits on exam consistent with acute infarct   She however has had persistent sensation of headache as well as weakness in her head since that time    she was evaluated by the neurology service who did not feel this was secondary to an acute neurologic event or neurologic etiology  -patient had a CT scan of her brain without any acute abnormality  She had a carotid ultrasound without significant stenosis                           B) cardiology:  Patient's initial troponin did not reveal any evidence of ischemia                        -[id number] without significant valvular heart disease/aortic stenosis                        -patient did not have orthostatic hypotension                                              -symptoms most likely secondary to atrial fibrillation with rapid ventricular rate, and subsequent cerebral hypoperfusion      3-hypertensive urgency:  Patient initially presented with elevated blood pressure    her medication regimen has been titrated with improved control  Her blood pressure today has predominantly ranged a systolic of 275-062H     4-status post headache:   associated with her syncopal episode    This is likely secondary to atrial fibrillation with rapid ventricular rate      5-hypothyroidism:  Patient's initial TSH was elevated at 8   Her Synthroid was increased to 37 5 mcg   Continue to monitor                         -outpatient TSH in 6 weeks      6-hyperlipidemia:  On diet therapy      7-incidental finding:  Admission CT scan revealed a 1 mm hyperdensity in the right basal ganglia   Repeat CT scan noted this was calcification, and clarified there is no evidence of hemorrhage      8-depression:  Continue home Zoloft     9-incidental finding:  Thrombocytopenia:  patient had a transient reading of thrombocytopenia on 10/15 with a platelet count of 828    This is most likely spurious as her current platelet count and previous platelet counts were normal        Discharge Plan:  patient will continue to require additional inpatient hospital stay due to need for further acute intervention for a fib

## 2017-10-18 NOTE — PLAN OF CARE
Problem: PHYSICAL THERAPY ADULT  Goal: Performs mobility at highest level of function for planned discharge setting  See evaluation for individualized goals  Treatment/Interventions: Functional transfer training, LE strengthening/ROM, Therapeutic exercise, Endurance training, Patient/family training, Bed mobility, Gait training, Spoke to nursing  Equipment Recommended: Paul Singh (SRAVAN)       See flowsheet documentation for full assessment, interventions and recommendations  Outcome: Progressing  Prognosis: Good  Problem List: Decreased strength, Decreased range of motion, Decreased endurance, Impaired balance, Decreased mobility  Assessment: Pt  seated in bedside chair upon my arrival  Able to complete all transfers during treatment session practicing proper technique with no noted LOB  Progressed with increased amb  trial with use of RW and standbyA of therapist  After additional amb  trial return to room with repositioning seated in bedside chair  Continue to recommend amb  trials with family while remaining in hospital for continued progression of functional mobililty  Pt  has completed all her PT goals and is safe for d/c home with Home PT and family support as needed when medically stable  Barriers to Discharge: None     Recommendation: Home PT, Home with family support     PT - OK to Discharge: Yes (if d/c when medically stable )    See flowsheet documentation for full assessment

## 2017-10-18 NOTE — PROGRESS NOTES
Heart Failure Service Progress Note - [employer] 80 y o  female MRN: [id number]    Unit/Bed#: E4 -01 Encounter: [id number]      Assessment:    Principal Problem:    Syncope  Active Problems:    Generalized weakness    Headache    Hypertensive urgency    Weakness    HLD (hyperlipidemia)    Hypothyroidism    # paroxysmal AFib now in NSR s/p metoprolol  [id number] = 4  Rates controlled in NSR  TTE shows normal LVEF w/o significant valvular disease  --Currently on amiodarone and metoprolol  Continue both @ current dose  --Re-address oral anticoagulation as outpatient  Especially as presented w/ syncope on this admission  Concerned for possible trauma and risk of hemorrhage  # HTN  # Hypothyroidism  # Syncope: ? 2/2 to AFib w/ RVR  Currently in NSR  Symptoms have resolved  Subjective:   Patient seen and examined  No significant events overnight  Objective:       [employer] (day, reason): Leal catheter (day, reason):    Vitals: Blood pressure 127/59, pulse (!) 53, temperature (!) 95 6 °F (35 3 °C), temperature source Tympanic, resp  rate 18, height 5' (1 524 m), weight 45 4 kg (100 lb), SpO2 100 %  , Body mass index is 19 53 kg/m² , I/O last 3 completed shifts: In: 80 [P O :580]  Out: 1675 [SQBLK:8223]  I/O this shift: In: 416 [P O :416]  Out: -   Wt Readings from Last 3 Encounters:   10/13/17 45 4 kg (100 lb)       Intake/Output Summary (Last 24 hours) at 10/18/17 1257  Last data filed at 10/18/17 0901   Gross per 24 hour   Intake              536 ml   Output              975 ml   Net             -439 ml     I/O last 3 completed shifts: In: 80 [P O :580]  Out: 1675 [Urine:1675]    No significant arrhythmias seen on telemetry review   NSR      Physical Exam:  Vitals:    10/18/17 0728 10/18/17 0729 10/18/17 0730 10/18/17 1200   BP: 143/67 149/74 169/75 127/59   Pulse: 58 65 62 (!) 53   Resp: 18      Temp: (!) 95 6 °F (35 3 °C)      TempSrc: Tympanic      SpO2: 100%      Weight: Height:           GEN: Bethany Cheadle appears well, alert and oriented x 3, pleasant and cooperative   HEENT: pupils equal, round, and reactive to light; extraocular muscles intact  NECK: supple, no carotid bruits   HEART: irregular rhythm, normal S1 and S2, no murmurs, clicks, gallops or rubs, JVD is flat    LUNGS: clear to auscultation bilaterally; no wheezes, rales, or rhonchi   ABDOMEN: normal bowel sounds, soft, no tenderness, no distention  EXTREMITIES: peripheral pulses normal; no clubbing, cyanosis, or edema  NEURO: no focal findings   SKIN: normal without suspicious lesions on exposed skin      Current Facility-Administered Medications:     acetaminophen (TYLENOL) tablet 650 mg, 650 mg, Oral, Q6H PRN, Delmi Knight PA-C, 650 mg at 10/14/17 2352    ALPRAZolam (XANAX) tablet 0 25 mg, 0 25 mg, Oral, TID PRN, Nika Spark DO    amiodarone tablet 200 mg, 200 mg, Oral, TID With Meals, Rujul DO Quinn, 200 mg at 10/18/17 1208    amLODIPine (NORVASC) tablet 5 mg, 5 mg, Oral, Daily, Delmi Knight PA-C, 5 mg at 10/18/17 6711    aspirin (ECOTRIN LOW STRENGTH) EC tablet 81 mg, 81 mg, Oral, Daily, Dora Hagan MD, 81 mg at 10/18/17 3781    docusate sodium (COLACE) capsule 100 mg, 100 mg, Oral, BID, Dora Hagan MD, 100 mg at 10/17/17 2125    fluticasone (FLONASE) 50 mcg/act nasal spray 1 spray, 1 spray, Each Nare, Daily, Delmi Knight PA-C, 1 spray at 10/18/17 0937    heparin (porcine) subcutaneous injection 5,000 Units, 5,000 Units, Subcutaneous, Q8H [address], 5,000 Units at 10/18/17 0618 **AND** Platelet count, , , Once, Nadia Jaime Byrne MD    hydrALAZINE (APRESOLINE) injection 5 mg, 5 mg, Intravenous, Q6H PRN, Herber Leni BRIGIDA Knight    levothyroxine tablet 37 5 mcg, 37 5 mcg, Oral, Early Morning, Delmi Knight PA-C, 37 5 mcg at 10/18/17 0618    metoprolol tartrate (LOPRESSOR) tablet 25 mg, 25 mg, Oral, Q12H [address], Dora Hagan MD, 25 mg at 10/18/17 0937    sertraline (ZOLOFT) tablet 25 mg, 25 mg, Oral, Daily, Shanice Ramsey MD, 25 mg at 10/18/17 0971      Labs & Results:        Results from last 7 days  Lab Units 10/17/17  0429 10/15/17  0438 10/14/17  0505   WBC Thousand/uL 6 21 4 97 5 05   HEMOGLOBIN g/dL 12 0 10 9* 12 2   HEMATOCRIT % 35 7 32 6* 36 0   PLATELETS Thousands/uL 157 129* 172           Results from last 7 days  Lab Units 10/17/17  0429 10/16/17  0459 10/15/17  0438  10/13/17  1654   SODIUM mmol/L 137 136 135*  < > 137   POTASSIUM mmol/L 4 0 3 7 3 8  < > 3 9   CHLORIDE mmol/L 102 104 106  < > 99*   CO2 mmol/L 27 26 25  < > 31   BUN mg/dL 15 11 11  < > 16   CREATININE mg/dL 0 65 0 67 0 74  < > 0 78   CALCIUM mg/dL 8 9 9 7 8 6  < > 9 8   TOTAL PROTEIN g/dL  --   --   --   --  7 7   BILIRUBIN TOTAL mg/dL  --   --   --   --  0 44   ALK PHOS U/L  --   --   --   --  75   ALT U/L  --   --   --   --  30   AST U/L  --   --   --   --  23   GLUCOSE RANDOM mg/dL 96 105 91  < > 97   < > = values in this interval not displayed  Results from last 7 days  Lab Units 10/13/17  1654   INR  0 87     LVEF: >55%, mild MR, mild to mod TR    EKG personally reviewed by Katie Harley MD      Counseling / Coordination of Care  Total floor / unit time spent today 40 minutes  Greater than 50% of total time was spent with the patient and / or family counseling and / or coordination of care  A description of the counseling / coordination of care: 25 min  Thank you for the opportunity to participate in the care of this patient      Katie Harley MD, PhD   Francisco Mayers

## 2017-10-19 ENCOUNTER — APPOINTMENT (INPATIENT)
Dept: MRI IMAGING | Facility: HOSPITAL | Age: 82
DRG: 305 | End: 2017-10-19
Payer: MEDICARE

## 2017-10-19 ENCOUNTER — APPOINTMENT (INPATIENT)
Dept: CT IMAGING | Facility: HOSPITAL | Age: 82
DRG: 305 | End: 2017-10-19
Payer: MEDICARE

## 2017-10-19 LAB
ATRIAL RATE: 59 BPM
ATRIAL RATE: 66 BPM
ATRIAL RATE: 66 BPM
ATRIAL RATE: 68 BPM
P AXIS: 59 DEGREES
P AXIS: 70 DEGREES
P AXIS: 73 DEGREES
P AXIS: 79 DEGREES
PR INTERVAL: 176 MS
PR INTERVAL: 180 MS
PR INTERVAL: 184 MS
PR INTERVAL: 188 MS
QRS AXIS: 16 DEGREES
QRS AXIS: 20 DEGREES
QRS AXIS: 22 DEGREES
QRS AXIS: 23 DEGREES
QRSD INTERVAL: 76 MS
QRSD INTERVAL: 78 MS
QT INTERVAL: 404 MS
QT INTERVAL: 404 MS
QT INTERVAL: 408 MS
QT INTERVAL: 430 MS
QTC INTERVAL: 423 MS
QTC INTERVAL: 423 MS
QTC INTERVAL: 425 MS
QTC INTERVAL: 433 MS
T WAVE AXIS: 54 DEGREES
T WAVE AXIS: 56 DEGREES
T WAVE AXIS: 56 DEGREES
T WAVE AXIS: 57 DEGREES
VENTRICULAR RATE: 59 BPM
VENTRICULAR RATE: 66 BPM
VENTRICULAR RATE: 66 BPM
VENTRICULAR RATE: 68 BPM

## 2017-10-19 PROCEDURE — 97530 THERAPEUTIC ACTIVITIES: CPT

## 2017-10-19 PROCEDURE — 72156 MRI NECK SPINE W/O & W/DYE: CPT

## 2017-10-19 PROCEDURE — 97116 GAIT TRAINING THERAPY: CPT

## 2017-10-19 PROCEDURE — 70496 CT ANGIOGRAPHY HEAD: CPT

## 2017-10-19 PROCEDURE — 97110 THERAPEUTIC EXERCISES: CPT

## 2017-10-19 PROCEDURE — A9585 GADOBUTROL INJECTION: HCPCS | Performed by: INTERNAL MEDICINE

## 2017-10-19 PROCEDURE — 70498 CT ANGIOGRAPHY NECK: CPT

## 2017-10-19 RX ORDER — POLYETHYLENE GLYCOL 3350 17 G/17G
17 POWDER, FOR SOLUTION ORAL DAILY
Status: DISCONTINUED | OUTPATIENT
Start: 2017-10-19 | End: 2017-10-20 | Stop reason: HOSPADM

## 2017-10-19 RX ADMIN — METOPROLOL TARTRATE 25 MG: 25 TABLET ORAL at 10:47

## 2017-10-19 RX ADMIN — HEPARIN SODIUM 5000 UNITS: 5000 INJECTION, SOLUTION INTRAVENOUS; SUBCUTANEOUS at 13:25

## 2017-10-19 RX ADMIN — LEVOTHYROXINE SODIUM 37.5 MCG: 75 TABLET ORAL at 05:52

## 2017-10-19 RX ADMIN — AMLODIPINE BESYLATE 5 MG: 5 TABLET ORAL at 10:47

## 2017-10-19 RX ADMIN — DOCUSATE SODIUM 100 MG: 100 CAPSULE, LIQUID FILLED ORAL at 10:48

## 2017-10-19 RX ADMIN — GADOBUTROL 5 ML: 604.72 INJECTION INTRAVENOUS at 01:00

## 2017-10-19 RX ADMIN — HEPARIN SODIUM 5000 UNITS: 5000 INJECTION, SOLUTION INTRAVENOUS; SUBCUTANEOUS at 05:52

## 2017-10-19 RX ADMIN — AMIODARONE HYDROCHLORIDE 200 MG: 200 TABLET ORAL at 13:25

## 2017-10-19 RX ADMIN — AMIODARONE HYDROCHLORIDE 200 MG: 200 TABLET ORAL at 18:34

## 2017-10-19 RX ADMIN — SERTRALINE HYDROCHLORIDE 25 MG: 25 TABLET ORAL at 10:48

## 2017-10-19 RX ADMIN — DOCUSATE SODIUM 100 MG: 100 CAPSULE, LIQUID FILLED ORAL at 18:34

## 2017-10-19 RX ADMIN — AMIODARONE HYDROCHLORIDE 200 MG: 200 TABLET ORAL at 10:46

## 2017-10-19 RX ADMIN — POLYETHYLENE GLYCOL 3350 17 G: 17 POWDER, FOR SOLUTION ORAL at 19:51

## 2017-10-19 RX ADMIN — FLUTICASONE PROPIONATE 1 SPRAY: 50 SPRAY, METERED NASAL at 10:48

## 2017-10-19 RX ADMIN — HEPARIN SODIUM 5000 UNITS: 5000 INJECTION, SOLUTION INTRAVENOUS; SUBCUTANEOUS at 21:23

## 2017-10-19 RX ADMIN — ASPIRIN 81 MG: 81 TABLET, COATED ORAL at 10:47

## 2017-10-19 RX ADMIN — IOHEXOL 85 ML: 350 INJECTION, SOLUTION INTRAVENOUS at 10:55

## 2017-10-19 NOTE — PHYSICAL THERAPY NOTE
Physical Therapy Progress Note     10/19/17 1158   Pain Assessment   Pain Assessment No/denies pain   Pain Score No Pain   Restrictions/Precautions   Weight Bearing Precautions Per Order No   Other Precautions Telemetry   General   Chart Reviewed Yes   Response to Previous Treatment Patient with no complaints from previous session  Family/Caregiver Present No   Subjective   Subjective Willing to participate in therapy this AM    Transfers   Sit to Stand 5  Supervision   Additional items Assist x 1; Armrests; Increased time required;Verbal cues   Stand to Sit 5  Supervision   Additional items Assist x 1; Armrests; Increased time required;Verbal cues   Ambulation/Elevation   Gait pattern Steppage  (reciprocal gait pattern)   Gait Assistance 5  Supervision   Additional items Assist x 1;Verbal cues; Tactile cues   Assistive Device (rollator)   Distance 360'   Balance   Static Sitting Good   Dynamic Sitting Fair +   Static Standing Fair +   Dynamic Standing Fair   Ambulatory Fair   Endurance Deficit   Endurance Deficit No   Activity Tolerance   Activity Tolerance Patient tolerated treatment well   Nurse Made Aware Yes   Exercises   TKR Sitting;10 reps;AROM; Bilateral   Assessment   Prognosis Good   Problem List Decreased strength;Decreased range of motion;Decreased endurance; Impaired balance;Decreased mobility   Assessment Pt  seated in bedside chair upon my arrival  Requested to use br, prior to amb  trial  Able to complete self pericare  Able to complete all transfers during treatment session practicing proper technique with no noted LOB  Progressed with increased amb  trial with use of rollator and standbyA of therapist with no noted LOB  Able to demonstrate proper safety techniques with rollator locking mechanisms  Repositioned seated in bedside chair at end of treatment session, awaiting lunch  Pt  has completed all her PT goals and is safe for d/c home with Home PT and family support as needed when medically stable  Barriers to Discharge None   Goals   Patient Goals To go home  STG Expiration Date 10/25/17   Treatment Day 4   Plan   Treatment/Interventions Functional transfer training;LE strengthening/ROM; Therapeutic exercise; Endurance training;Bed mobility;Gait training;Spoke to nursing;Spoke to case management   Progress Improving as expected   PT Frequency 5x/wk   Recommendation   Recommendation Home PT; Home with family support   Equipment Recommended Other (Comment)  (has rollator)   PT - OK to Discharge Yes  (if d/c when medically stable  l)     Kymberly Martinez, PTA

## 2017-10-19 NOTE — PROGRESS NOTES
Progress Note - Irvin Jorgensen 80 y o  female MRN: [id number]    Unit/Bed#: E4 -01 Encounter: [id number]      Assessment/Plan:  1-new onset paroxysmal atrial fibrillation with rapid ventricular rate:  Patient was noted to have an episode of atrial fibrillation with a rapid ventricular rate                -she was started on p o  metoprolol for rate control                          -she was evaluated by the cardiology service who noted that she had a rapid ventricular response while she was in atrial fibrillation  Yousuf Escobar also started amiodarone 200 mg t i d  Lucina Rm                       -5B echocardiogram without significant valvular heart disease   She had mild-moderate tricuspid regurgitation   No significant aortic stenosis   Mild mitral regurgitation                         -continue telemetry                        -TSH noted to be elevated, (patient with h/o hypothyroidism on synthroid)                        -Emeterio Vasc 2 score is 4:   patient however not amenable to anticoagulation at this time                        -patient has had multiple additional episodes of pressure in the back of her head, as well as heaviness radiating down both arms, similar to her symptoms upon admission, and similar to prior to her syncopal episode  The, however she has not had any significant arrhythmias yesterday or today  Her telemetry was reviewed by the cardiology service and she did not have any atrial fibrillation, or significant pauses, or arrhythmias  -pt currently in sinus bradycardia:  Will confer with cardiology team                          2-syncope:  Patient presented after a syncopal episode  Prior to the episode she had a pressure in the back of her head, which radiated down both arms  Patient has continued to have this sensation, without any additional syncopal episodes since admission    Her symptoms persist, so she continues to be evaluated by the neurology and cardiology teams                           A) neurologic:  Patient had a CT scan of her brain without any acute abnormality   She had a carotid ultrasound without significant stenosis  She underwent an MRI of the cervical spine without any focal abnormality             -due to the persistence of her symptoms the neurology service has ordered a CTA of the head and neck which is still pending                            B) cardiology:  Initially it was postulated that patient's symptoms may be secondary to her episode of atrial fibrillation with rapid ventricular rate  Patient's initial syncopal episode is likely secondary to this arrhythmia             -patient was evaluated by the cardiology service:  Her persistent symptoms of heaviness at the nape of her neck radiating down both arms is not felt to be of cardiac etiology:  Her EKG and cardiac enzymes did not reveal any evidence of ischemia  Her symptoms were not felt to be anginal equivalent by the cardiology service, and further ischemic testing was not recommended                         -echocardiogram without significant valvular heart disease/aortic stenosis                        -patient did not have orthostatic hypotension                          3-essential hypertension[de-identified]  Patient initially presented with elevated blood pressure    her medication regimen has been titrated with improved control    Patient's blood pressure remains within acceptable ranges, with a systolic blood pressure ranging predominantly 120-140s the last 2 days      4--hypothyroidism:  Patient's initial TSH was elevated at 8   Her Synthroid was increased to 37 5 mcg   Continue to monitor                         -outpatient TSH in 6 weeks      5-hyperlipidemia:  On diet therapy      6-incidental finding:  Admission CT scan revealed a 1 mm hyperdensity in the right basal ganglia   Repeat CT scan noted this was calcification, and clarified there is no evidence of hemorrhage      7-depression:  Continue home Zoloft     8-incidental finding:  Thrombocytopenia:  patient had a transient reading of thrombocytopenia on 10/15 with a platelet count of 040   This is most likely spurious as her current platelet count and previous platelet counts were normal     9-family:  Updated daughter Onita Poag at bedside      VTE Pharmacologic Prophylaxis: Heparin  VTE Mechanical Prophylaxis: sequential compression device    Certification Statement: The patient will continue to require additional inpatient hospital stay due to need for further acute intervention for syncope, paroxysmal atrial fibrillation,    Status: inpatient     Case reviewed with Neurology team    ===================================================================    Subjective: The patient relates that she continued to have episodes of a heaviness sensation at the nape of her neck, since yesterday afternoon  She notes she has not had the heaviness radiating down both arms  Episodes last only a few seconds, but occur intermittently throughout the evening, and she had a more intensive episode this morning  Patient denies any associated visual changes, numbness, weakness, balance problems, vertigo  She denies any associated chest pain, chest pressure or heaviness  She denies any nausea, vomiting, abdominal pain or cramping  She denies any diaphoresis  Patient's during these episodes she feels as though her breathing is heavy and she is breathing faster than usual   She were denies a sensation of being out of breath or difficulty breathing  She denies any chest congestion or cough  Physical Exam:   Temp:  [97 5 °F (36 4 °C)-99 °F (37 2 °C)] 97 5 °F (36 4 °C)  HR:  [50-65] 64  Resp:  [18] 18  BP: (121-156)/(59-76) 141/76    Gen:  Pleasant, non-tachypnic, non-dyspnic  Conversant  Sitting up in a chair    Heart: regular rate and rhythm, S1S2 present, no murmur, rub or gallop  Lungs: clear to ausculatation bilaterally  No wheezing, crackles, or rhonchi  No accessory muscle use or respiratory distress  Abd: soft, non-tender, non-distended  NABS, no guarding, rebound or peritoneal signs  Extremities: no clubbing, cyanosis or edema  2+pedal pulses bilaterally  Full range of motion  Neuro: awake, alert and oriented  Cranial nerves 2-12 intact  Strength and sensation grossly intact  Skin: warm and dry: no petechiae, purpura and rash  LABS:     Results from last 7 days  Lab Units 10/17/17  0429 10/15/17  0438 10/14/17  0505   WBC Thousand/uL 6 21 4 97 5 05   HEMOGLOBIN g/dL 12 0 10 9* 12 2   HEMATOCRIT % 35 7 32 6* 36 0   PLATELETS Thousands/uL 157 129* 172       Results from last 7 days  Lab Units 10/17/17  0429 10/16/17  0459 10/15/17  0438   SODIUM mmol/L 137 136 135*   POTASSIUM mmol/L 4 0 3 7 3 8   CHLORIDE mmol/L 102 104 106   CO2 mmol/L 27 26 25   BUN mg/dL 15 11 11   CREATININE mg/dL 0 65 0 67 0 74   GLUCOSE RANDOM mg/dL 96 105 91   CALCIUM mg/dL 8 9 9 7 8 6     Hospital Data:  10/19:  MRI cervical spine with and without contrast:  Cervical cord intrinsically normal   Despite multilevel degenerative changes, no cord or root compression  No pathologic enhancement      10/13:  CT brain:  1  No acute intracranial abnormality  2  Hyperdensity in the right basal ganglia representing a focus of calcification     10/13:  CT brain:  1  Tiny 1 mm hyperdensity identified in the right basal ganglia likely represents focus of calcification and not acute hemorrhage  I have no priors for comparison  Follow-up noncontrast head CT is recommended in 3-4 hours to assure stability  No other   areas of concern for acute intracranial abnormality    2  Volume loss/atrophy and white matter microangiopathy      10/13:  Chest x-ray:  No acute disease     Echocardiogram:   left ventricular ejection fraction 60%   No regional wall motion abnormality   Right atrium mildly dilated   Mild-moderate tricuspid regurgitation      Carotid ultrasound:  No significant stenosis bilaterally      Urinalysis:  Negative nitrates, leukocyte esterase          ---------------------------------------------------------------------------------------------------------------  This note has been constructed using a voice recognition system

## 2017-10-19 NOTE — PLAN OF CARE
Problem: PHYSICAL THERAPY ADULT  Goal: Performs mobility at highest level of function for planned discharge setting  See evaluation for individualized goals  Treatment/Interventions: Functional transfer training, LE strengthening/ROM, Therapeutic exercise, Endurance training, Patient/family training, Bed mobility, Gait training, Spoke to nursing  Equipment Recommended: Angelica Murguia (SRAVAN)       See flowsheet documentation for full assessment, interventions and recommendations  Outcome: Progressing  Prognosis: Good  Problem List: Decreased strength, Decreased range of motion, Decreased endurance, Impaired balance, Decreased mobility  Assessment: Pt  seated in bedside chair upon my arrival  Requested to use br, prior to amb  trial  Able to complete self pericare  Able to complete all transfers during treatment session practicing proper technique with no noted LOB  Progressed with increased amb  trial with use of rollator and standbyA of therapist with no noted LOB  Able to demonstrate proper safety techniques with rollator locking mechanisms  Repositioned seated in bedside chair at end of treatment session, awaiting lunch  Pt  has completed all her PT goals and is safe for d/c home with Home PT and family support as needed when medically stable  Barriers to Discharge: None     Recommendation: Home PT, Home with family support     PT - OK to Discharge: Yes (if d/c when medically stable  l)    See flowsheet documentation for full assessment

## 2017-10-19 NOTE — PROGRESS NOTES
Progress Note - Joyce Kovacs 80 y o  female MRN: [id number]    Unit/Bed#: E4 -01 Encounter: [id number]  Subjective:   No chest pain or SOB  No dizziness  No edema  No palpitations    Objective:   Vitals: Blood pressure (!) 181/80, pulse 64, temperature 98 7 °F (37 1 °C), temperature source Temporal, resp  rate 18, height 5' (1 524 m), weight 45 4 kg (100 lb), SpO2 99 %  ,Body mass index is 19 53 kg/m²  Physical exam:  Lungs-clear without wheezing rhonchi rales  Heart-regular with grade 1-2 systolic murmur at the base  No diastolic murmur rub or gallop  Abdomen-nontender without mass organomegaly  Extremities-no edema  Diminished pulses in the lower extremities    Assessment/Plan:  1/ vague spells which appear to be unrelated to paroxysmal atrial fibrillation  Not clearly vagal   2/ hypertension-labile  Patient is now on amlodipine which she was taking at home and metoprolol  We have stopped her ARB in ACE-inhibitor  As noted previously would suggest less tighter control of blood pressure especially since left ventricular wall thickness is not all that impressive  3/ paroxysmal atrial fibrillation-patient is now on loading dose of amiodarone  Would suggest reducing this to 200 mg daily in 1 weeks time  Will arrange follow-up in our office in about 2 weeks time  Can be discharged from our standpoint  Will see here piotr Pandey MD

## 2017-10-20 VITALS
OXYGEN SATURATION: 98 % | BODY MASS INDEX: 19.63 KG/M2 | WEIGHT: 100 LBS | HEIGHT: 60 IN | SYSTOLIC BLOOD PRESSURE: 135 MMHG | HEART RATE: 57 BPM | TEMPERATURE: 97.8 F | RESPIRATION RATE: 18 BRPM | DIASTOLIC BLOOD PRESSURE: 69 MMHG

## 2017-10-20 LAB
ATRIAL RATE: 58 BPM
ATRIAL RATE: 63 BPM
ATRIAL RATE: 64 BPM
ATRIAL RATE: 71 BPM
P AXIS: 68 DEGREES
P AXIS: 69 DEGREES
P AXIS: 73 DEGREES
P AXIS: 79 DEGREES
PR INTERVAL: 172 MS
PR INTERVAL: 188 MS
PR INTERVAL: 198 MS
PR INTERVAL: 208 MS
QRS AXIS: 14 DEGREES
QRS AXIS: 26 DEGREES
QRS AXIS: 27 DEGREES
QRS AXIS: 33 DEGREES
QRSD INTERVAL: 74 MS
QRSD INTERVAL: 80 MS
QRSD INTERVAL: 82 MS
QRSD INTERVAL: 82 MS
QT INTERVAL: 390 MS
QT INTERVAL: 420 MS
QT INTERVAL: 426 MS
QT INTERVAL: 428 MS
QTC INTERVAL: 412 MS
QTC INTERVAL: 423 MS
QTC INTERVAL: 435 MS
QTC INTERVAL: 441 MS
T WAVE AXIS: 58 DEGREES
T WAVE AXIS: 58 DEGREES
T WAVE AXIS: 60 DEGREES
T WAVE AXIS: 66 DEGREES
VENTRICULAR RATE: 58 BPM
VENTRICULAR RATE: 63 BPM
VENTRICULAR RATE: 64 BPM
VENTRICULAR RATE: 71 BPM

## 2017-10-20 PROCEDURE — 97116 GAIT TRAINING THERAPY: CPT

## 2017-10-20 PROCEDURE — 97110 THERAPEUTIC EXERCISES: CPT

## 2017-10-20 PROCEDURE — 97530 THERAPEUTIC ACTIVITIES: CPT

## 2017-10-20 RX ORDER — AMLODIPINE BESYLATE 5 MG/1
5 TABLET ORAL DAILY
Qty: 30 TABLET | Refills: 0 | Status: SHIPPED | OUTPATIENT
Start: 2017-10-21 | End: 2018-04-13

## 2017-10-20 RX ORDER — LEVOTHYROXINE SODIUM 0.07 MG/1
37.5 TABLET ORAL
Qty: 30 TABLET | Refills: 0 | Status: SHIPPED | OUTPATIENT
Start: 2017-10-21 | End: 2018-03-26 | Stop reason: SDUPTHER

## 2017-10-20 RX ORDER — ASPIRIN 81 MG/1
81 TABLET ORAL DAILY
Qty: 30 TABLET | Refills: 0 | Status: SHIPPED | OUTPATIENT
Start: 2017-10-21 | End: 2018-04-13

## 2017-10-20 RX ORDER — AMIODARONE HYDROCHLORIDE 200 MG/1
200 TABLET ORAL
Qty: 22 TABLET | Refills: 0 | Status: SHIPPED | OUTPATIENT
Start: 2017-10-20 | End: 2018-04-13

## 2017-10-20 RX ORDER — AMIODARONE HYDROCHLORIDE 200 MG/1
200 TABLET ORAL DAILY
Qty: 30 TABLET | Refills: 0 | Status: SHIPPED | OUTPATIENT
Start: 2017-10-28 | End: 2018-04-13

## 2017-10-20 RX ADMIN — METOPROLOL TARTRATE 12.5 MG: 25 TABLET ORAL at 09:45

## 2017-10-20 RX ADMIN — SERTRALINE HYDROCHLORIDE 25 MG: 25 TABLET ORAL at 09:45

## 2017-10-20 RX ADMIN — ASPIRIN 81 MG: 81 TABLET, COATED ORAL at 09:45

## 2017-10-20 RX ADMIN — LEVOTHYROXINE SODIUM 37.5 MCG: 75 TABLET ORAL at 05:14

## 2017-10-20 RX ADMIN — HEPARIN SODIUM 5000 UNITS: 5000 INJECTION, SOLUTION INTRAVENOUS; SUBCUTANEOUS at 05:14

## 2017-10-20 RX ADMIN — FLUTICASONE PROPIONATE 1 SPRAY: 50 SPRAY, METERED NASAL at 09:45

## 2017-10-20 RX ADMIN — AMLODIPINE BESYLATE 5 MG: 5 TABLET ORAL at 09:45

## 2017-10-20 RX ADMIN — AMIODARONE HYDROCHLORIDE 200 MG: 200 TABLET ORAL at 09:44

## 2017-10-20 RX ADMIN — AMIODARONE HYDROCHLORIDE 200 MG: 200 TABLET ORAL at 12:49

## 2017-10-20 NOTE — PLAN OF CARE
Problem: PHYSICAL THERAPY ADULT  Goal: Performs mobility at highest level of function for planned discharge setting  See evaluation for individualized goals  Treatment/Interventions: Functional transfer training, LE strengthening/ROM, Therapeutic exercise, Endurance training, Patient/family training, Bed mobility, Gait training, Spoke to nursing  Equipment Recommended: Thao Elena (RW)       See flowsheet documentation for full assessment, interventions and recommendations  Outcome: Progressing  Prognosis: Good  Problem List: Decreased strength, Decreased range of motion, Decreased endurance, Impaired balance, Decreased mobility  Assessment: Pt  seated in bedside chair upon my arrival  Reports going home later today, however wishes to perform therapy session prior to d/c  Able to complete all transfers practicing proper technique with no noted LOB  Progressed with an amb  trial with use of rollator, HR closely monitored throughout treatment session remaining between 70-80's with no reports of increased fatigue as reported in previous session  After additional amb  trial pt  returned to room  Repositioned seated in bedside chair at end of treatment session  PT will continue to recommend Home PT and family support as needed when medically stable for d/c   Barriers to Discharge: None     Recommendation: Home PT, Home with family support     PT - OK to Discharge: Yes (if d/c when medically stable )    See flowsheet documentation for full assessment

## 2017-10-20 NOTE — PHYSICAL THERAPY NOTE
Physical Therapy Progress Note     10/20/17 1101   Pain Assessment   Pain Assessment No/denies pain   Pain Score No Pain   Restrictions/Precautions   Weight Bearing Precautions Per Order No   Other Precautions Telemetry   General   Chart Reviewed Yes   Response to Previous Treatment Patient with no complaints from previous session  Family/Caregiver Present No   Subjective   Subjective Willing to participate in therapy this AM    Transfers   Sit to Stand 5  Supervision   Additional items Assist x 1; Armrests; Increased time required;Verbal cues   Stand to Sit 5  Supervision   Additional items Assist x 1; Armrests; Increased time required;Verbal cues   Ambulation/Elevation   Gait pattern Steppage  (reciprocal gait pattern)   Gait Assistance 5  Supervision   Additional items Assist x 1;Verbal cues; Tactile cues   Assistive Device (rollator )   Distance 200'   Stair Management Assistance Not tested   Balance   Static Sitting Good   Dynamic Sitting Fair +   Static Standing Fair +   Dynamic Standing Fair   Ambulatory Fair   Endurance Deficit   Endurance Deficit No   Activity Tolerance   Activity Tolerance Patient tolerated treatment well   Nurse Made Aware Yes   Exercises   TKR Sitting;10 reps;AROM; Bilateral   Assessment   Prognosis Good   Problem List Decreased strength;Decreased range of motion;Decreased endurance; Impaired balance;Decreased mobility   Assessment Pt  seated in bedside chair upon my arrival  Reports going home later today, however wishes to perform therapy session prior to d/c  Able to complete all transfers practicing proper technique with no noted LOB  Progressed with an amb  trial with use of rollator, HR closely monitored throughout treatment session remaining between 70-80's with no reports of increased fatigue as reported in previous session  After additional amb  trial pt  returned to room  Repositioned seated in bedside chair at end of treatment session   PT will continue to recommend Home PT and family support as needed when medically stable for d/c    Barriers to Discharge None   Goals   Patient Goals To go home  STG Expiration Date 10/25/17   Treatment Day 5   Plan   Treatment/Interventions Functional transfer training;LE strengthening/ROM; Therapeutic exercise; Endurance training;Gait training;Spoke to nursing;Spoke to case management   Progress Improving as expected   PT Frequency 5x/wk   Recommendation   Recommendation Home PT; Home with family support   PT - OK to Discharge Yes  (if d/c when medically stable )     Eva Alexis, WENDY

## 2017-10-20 NOTE — DISCHARGE INSTRUCTIONS
Your blood pressure medications have been changed  Stop taking amlodipine/benazepril and Benicar  Start taking amlodipine 5 mg daily and metoprolol tartrate 12 5 mg twice daily  Start taking aspirin 81 mg daily  Start taking amiodarone 200 mg 3 times daily until 10/27/17  On 10/28/17 start taking amiodarone 200 mg once daily  Make an outpatient appointment with Neurosurgery Dr Kyle Jacobson with cardiology Dr Erin Hernandez- the office will call you to make an appointment  Get TSH checked in 6 weeks  You are given a blood work script for this

## 2017-10-20 NOTE — DISCHARGE SUMMARY
Discharge Summary - [address] [facility]    Patient Information: Sage Ellison 80 y o  female MRN: [id number]  Unit/Bed#: E4 -01 Encounter: [id number]    Discharging Physician / Practitioner: Kait Jiménez PA-C  PCP: Rishi Russell DO  Admission Date: 10/13/2017  Discharge Date: 10/20/17    Reason for Admission: Syncope / head pressure    Discharge Diagnoses:     Principal Problem:    Syncope  Active Problems:    Generalized weakness    Headache    Hypertensive urgency    Weakness    HLD (hyperlipidemia)    Hypothyroidism    Anxiety    Depression    Hyperlipidemia    Hypertension  Resolved Problems:    * No resolved hospital problems  *      Consultations During Hospital Stay:  · Cardiology-Dr Owen Salgado  · Neurology-Dr Pabon    Procedures Performed:   · 10/13/17 Chest x-ray:  No active pulmonary disease    · 10/13/17 CT head without contrast: 1  Tiny 1 mm hyperdensity identified in the right basal ganglia likely represents focus of calcification and not acute hemorrhage  I have no priors for comparison  Follow-up noncontrast head CT is recommended in 3-4 hours to assure stability  No otherareas of concern for acute intracranial abnormality  2  Volume loss/atrophy and white matter microangiopathy  · 10/13/17 CT head without contrast: 1  No acute intracranial abnormality  2  Hyperdensity in the right basal ganglia representing a focus of calcification  · 10/16/17 Carotid ultrasound: RIGHT: There is <50% stenosis noted in the internal carotid artery  Plaque is heterogenous  Vertebral artery flow is antegrade  There is no significant subclavian artery disease  LEFT: There is <50% stenosis noted in the internal carotid artery  Plaque is heterogenous  Vertebral artery flow is antegrade  There is no significant subclavian artery disease      · 10/19/17 MRI cervical spine with and without contrast: Cervical cord intrinsically normal   Despite multilevel degenerative changes, no cord or root compression  No pathologic enhancement  · 10/19/17 CTA head and neck with and without contrast: Stable CT of the brain with confluence low density in both cerebral hemispheres suggestive of chronic microangiopathy  Mild smooth less than 50% narrowing of the proximal right subclavian artery  Mild smooth narrowing of the right internal carotid artery origin, less than 50%  Subtle irregularities involving the mid to distal cervical internal carotid arteries bilaterally suggestive of fibromuscular dysplasia  2 mm aneurysm arising from the intracranial internal carotid artery on the right along the posterior medial aspect of the internal carotid artery, at the level of the ophthalmic artery origin  This is above the optic strut and consistent with intracranial aneurysm  2 mm infundibulum at the origin of the left posterior communicating artery  · 10/16/17 Echocardiogram:   LEFT VENTRICLE: Systolic function was normal  Ejection fraction was estimated to be 60 %  There were no regional wall motion abnormalities  Wall thickness was at the upper limits of normal     RIGHT ATRIUM:  The atrium was mildly dilated      MITRAL VALVE:  There was mild annular calcification  There was mild regurgitation      AORTIC VALVE:  The valve was trileaflet  Leaflets exhibited moderately increased thickness      TRICUSPID VALVE:  There was mild to moderate regurgitation  Significant Findings / Test Results:   · 2 mm intracranial aneurysm  · New onset paroxysmal atrial fibrillation with RVR-started on beta-blocker and amiodarone   -declines anticoagulation at this time and cardiology will re-discuss as an outpt   -started on aspirin 81 mg daily  · Elevated TSH 8 069- patient's Synthroid was increased from 20/5 mcgs to 37 5 mcgs   -will need outpatient TSH within the next 4-6 weeks  Incidental Findings:   · Thrombocytopenia:  Platelet count 236 on 10/15/17  This has trended upward since then    · 1 mm hyperdensity in the right basal ganglia-noted to be calcification    Test Results Pending at Discharge (will require follow up): · None     Outpatient Tests Requested:  · TSH level in 6 weeks- given script for    Outpatient follow-up requested:  · Cardiology-Dr Karlos Potter in 2 weeks- office will call pt  · Neurosurgery-Dr Kd Farooq    Complications:  None    Hospital Course per attendings notes:   1-new onset paroxysmal atrial fibrillation with rapid ventricular rate:  Patient was noted to have an episode of atrial fibrillation with a rapid ventricular rate                          -she was started on p o  metoprolol 12 5 mg BID for rate control                         -she was evaluated by the cardiology service who noted that she had a rapid ventricular response while she was in atrial fibrillation  They also started amiodarone 200 mg t i d  Dorathy Infield                       -4O echocardiogram without significant valvular heart disease   She had mild-moderate tricuspid regurgitation   No significant aortic stenosis   Mild mitral regurgitation                         -Monitored on telemetry                        -TSH noted to be elevated, (patient with h/o hypothyroidism on synthroid)                        -natalie Vasc 2 score is 4: patient however not amenable to anticoagulation at this time  She will further discuss this with cardiology as an outpt                               -patient has had multiple additional episodes of pressure in the back of her head, as well as heaviness radiating down both arms, similar to her symptoms upon admission, and similar to prior to her syncopal episode  The, however she has not had any significant arrhythmias yesterday or today  Her telemetry was reviewed by the cardiology service and she did not have any atrial fibrillation, or significant pauses, or arrhythmias   pt currently in sinus bradycardia with HR 50-60's                                               2-syncope:  Patient presented after a syncopal episode  Prior to the episode she had a pressure in the back of her head, which radiated down both arms  Patient has continued to have this sensation, without any additional syncopal episodes since admission  Her symptoms persist, so she continues to be evaluated by the neurology and cardiology teams                                                A) neurologic:  Patient had a CT scan of her brain without any acute abnormality   She had a carotid ultrasound without significant stenosis  She underwent an MRI of the cervical spine without any focal abnormality  -due to the persistence of her symptoms the neurology service has ordered a CTA of the head and neck which revealed a 2 mm intracranial aneurysm  Patient will require outpatient neurosurgery follow-up for this                            B) cardiology:  Initially it was postulated that patient's symptoms may be secondary to her episode of atrial fibrillation with rapid ventricular rate  Patient's initial syncopal episode is likely secondary to this arrhythmia  -patient was evaluated by the cardiology service:  Her persistent symptoms of heaviness at the nape of her neck radiating down both arms is not felt to be of cardiac etiology:  Her EKG and cardiac enzymes did not reveal any evidence of ischemia  Her symptoms were not felt to be anginal equivalent by the cardiology service, and further ischemic testing was not recommended                         -echocardiogram without significant valvular heart disease/aortic stenosis                        -patient did not have orthostatic hypotension                          3-essential hypertension: Patient initially presented with elevated blood pressure  Her medication regimen has been titrated with improved control  Patient's blood pressure remains within acceptable ranges, with a systolic blood pressure ranging predominantly 120-140s     4-hypothyroidism:  Patient's initial TSH was elevated at 8   Her Synthroid was increased from 25 mcg to 37 5 mcg   Outpatient TSH in 6 weeks      5-hyperlipidemia:  On diet therapy      6-incidental finding:  Admission CT scan revealed a 1 mm hyperdensity in the right basal ganglia   Repeat CT scan noted this was calcification, and clarified there is no evidence of hemorrhage      7-depression:  Continue home Zoloft     8-incidental finding:  Thrombocytopenia:  patient had a transient reading of thrombocytopenia on 10/15 with a platelet count of 542   This is most likely spurious as her current platelet count and previous platelet counts were normal  Last platelet count improved to 157  Discharge Summary:    Nevin Ivy is a 80 y o  female patient with a PMH of hypothyroidism, hyperlipidemia-not on therapy, anxiety/depression, hypertension, who originally presented to the hospital on 10/13/2017 due to a syncopal episode with prior sensation of pressure in the back of her head radiating down both of her arms  Since admission she has not had any other further episodes of syncope but does continue with a pressure  She was seen and evaluated by both Neurology and Cardiology  She underwent a CT of her brain, carotid ultrasound with no acute abnormality  Given persistence of her symptoms she also had a CTA of head and neck which revealed a 2 mm intracranial aneurysm  She will require outpatient neural surgery follow-up for this  During the patient's hospital stay, she was also found to have atrial fibrillation with RVR  It was felt that her initial syncopal episode is secondary to this arrhythmia  An echocardiogram showed no evidence of significant valvular heart disease or aortic stenosis  No evidence of orthostatic hypotension present  Her EKG and cardiac enzymes revealed no evidence of ischemia and no further ischemic testing was warranted by the cardiology service    Given new onset of paroxysmal atrial fibrillation, her blood pressure medications have been changed  She was taken off Benicar and lisinopril and placed on metoprolol 12 5 mg twice daily for rate control  Additionally she will continue her home amlodipine of 5 mg daily  Amiodarone loading dose was initiated as well  She will continue on 200 mg of amiodarone three times daily for an additional week until 10/27/17  Thereafter, starting 10/28/17 reduce amiodarone to 200 mg daily per Cardiology  She will also have a outpatient cardiology appointment set up in 2 weeks  Patient's TSH was also noted to be 8 069  She is on 25mcg of Synthroid daily which was increased to 37 5 mg daily  Her free t4 was 0 86  She will require outpatient TSH in 6 weeks  Currently, patient is asymptomatic with no chest pain or pressure, palpitations or further episodes of syncope  She is ambulating with her home walker without difficulty  She is currently in sinus bradycardia with heart rate ranging in the 50 to 60s  In conclusion, she is stable for discharge at this time back to [address]  She is asked for follow-up with Neurosurgery and Cardiology  Cardiology office will call patient to schedule appointment  Condition at Discharge: stable     Discharge Day Visit / Exam:     Subjective:  Patient resting comfortably in chair at bedside  She is doing well  No further episodes of syncope or palpitations  Denies any lightheadedness, dizziness  Discussed medication changes with addition of new medications including metoprolol, amlodipine, aspirin, and amiodarone  Discussed importance of follow-up with Neurosurgery and Cardiology as an outpatient  All questions from daughter and patient at bedside were answered      Vitals: Blood Pressure: 123/68 (10/20/17 0700)  Pulse: 60 (10/20/17 0700)  Temperature: 97 6 °F (36 4 °C) (10/20/17 0700)  Temp Source: Tympanic (10/20/17 0700)  Respirations: 19 (10/20/17 0700)  Height: 5' (152 4 cm) (10/13/17 7873)  Weight - Scale: 45 4 kg (100 lb) (10/13/17 2343)  SpO2: 97 % (10/20/17 0700)  Exam:   Physical Exam   Constitutional: She is oriented to person, place, and time  She appears well-developed and well-nourished  No distress  HENT:   Head: Normocephalic and atraumatic  Eyes: Conjunctivae are normal    Cardiovascular: Normal rate, regular rhythm and normal heart sounds  Pulmonary/Chest: Effort normal and breath sounds normal  No respiratory distress  She has no wheezes  She has no rales  She exhibits no tenderness  Abdominal: Soft  Bowel sounds are normal  She exhibits no distension and no mass  There is no tenderness  There is no rebound and no guarding  Neurological: She is alert and oriented to person, place, and time  Skin: Skin is warm and dry  She is not diaphoretic  Psychiatric: She has a normal mood and affect  Her behavior is normal  Judgment and thought content normal    Nursing note and vitals reviewed  Discharge instructions/Information to patient and family:   See after visit summary for information provided to patient and family  Provisions for Follow-Up Care:  See after visit summary for information related to follow-up care and any pertinent home health orders  Disposition:     Home- [facility]- independent living    For Discharges to   [address] [facility]:   · Not Applicable to this Patient - Not Applicable to this Patient     Planned Readmission: no     Discharge Statement:  I spent 56 minutes discharging the patient  This time was spent on the day of discharge  I had direct contact with the patient on the day of discharge  Greater than 50% of the total time was spent examining patient, answering all patient questions, arranging and discussing plan of care with patient as well as directly providing post-discharge instructions  Additional time then spent on discharge activities  Discharge Medications:  See after visit summary for reconciled discharge medications provided to patient and family        ** Please Note: This note has been constructed using a voice recognition system **

## 2017-10-26 ENCOUNTER — ALLSCRIPTS OFFICE VISIT (OUTPATIENT)
Dept: OTHER | Facility: OTHER | Age: 82
End: 2017-10-26

## 2017-10-27 ENCOUNTER — GENERIC CONVERSION - ENCOUNTER (OUTPATIENT)
Dept: OTHER | Facility: OTHER | Age: 82
End: 2017-10-27

## 2017-10-30 ENCOUNTER — ALLSCRIPTS OFFICE VISIT (OUTPATIENT)
Dept: OTHER | Facility: OTHER | Age: 82
End: 2017-10-30

## 2017-10-31 NOTE — PROGRESS NOTES
Assessment  Assessed    1  Paroxysmal atrial fibrillation (427 31) (I48 0)   2  Benign essential HTN (401 1) (I10)   3  Episodic weakness (728 87) (R53 1)    Plan  Atrial fibrillation with rapid ventricular response    · EKG/ECG- POC; Status:Complete;   Done: [id number] 03:27PM   Perform: In Office; Last Updated By:Gunjan Bassett; 10/30/2017 3:27:45 PM;Ordered; For:Atrial fibrillation with rapid ventricular response; Ordered By:Deonte Tavares;  Benign essential HTN    · AmLODIPine Besylate 5 MG Oral Tablet; TAKE 1 TABLET DAILY   Rx By: Altagracia Ordaz; Dispense: 30 Days ; #:30 Tablet; Refill: 5;For: Benign essential HTN; CARLOZ = N; Sent To: [facility]  Paroxysmal atrial fibrillation    · Amiodarone HCl - 100 MG Oral Tablet; TAKE 1 TABLET DAILY   Rx By: Altagracia Ordaz; Dispense: 30 Days ; #:30 Tablet; Refill: 5;For: Paroxysmal atrial fibrillation; CARLOZ = N; Sent To: [address] [place] [address]; Msg to Pharmacy: dose reduction after she finishes current supply of amiodarone 200 mg   · Metoprolol Tartrate 25 MG Oral Tablet; TAKE 1/2 TABLET EVERY 12 HOURS   Rx By: Altagracia Ordaz; Dispense: 30 Days ; #:30 Tablet; Refill: 5;For: Paroxysmal atrial fibrillation; CARLOZ = N; Sent To: [address] [place] [address]   · Follow-up visit in 2 months Evaluation and Treatment  Follow-up  Status: Hold For -  Scheduling  Requested for: [id number]   Ordered; For: Paroxysmal atrial fibrillation; Ordered By: Altagracia Ordaz Performed:  Due: [id number]    Discussion/Summary  Cardiology Discussion Summary Free Text Note Form [facility]: It is my impression at the patient continues to have episodic weakness however these spells appear to be less severe and shorter in duration  I feel she probably was having drops in blood pressure  We have eliminated her ACE-inhibitor and ARB  She will continue amlodipine for BP  Her blood pressure was fine today   In the hospital she had episodic hypertension but I feel generally her blood pressure is normal  Her wall thickness on echocardiogram was not all that impressive to suggest uncontrolled hypertension  She will continue amiodarone for suppression of atrial fibrillation  I will reduce this to 100 mg daily when she feels her next prescription  She will continue low-dose metoprolol for now which we may be able to decrease or eliminate after further amiodarone lowering  Her spells of weakness did not correlate with atrial fibrillation  We will continue aspirin as an anticoagulation strategy in light of these spells and the potential for falls  In the future if these become less worrisome, we may want to consider enhanced anticoagulation  I will see her again in 2 months time  Chief Complaint  Chief Complaint Free Text Note Form: patient post hospital for PAF, vague spells not related to AFIB but possibly related to dips in BP (meds decreased for BP)   Chief Complaint Chronic Condition [facility]: Patient is here today for follow up of chronic conditions described in HPI  History of Present Illness  Cardiology HPI Free Text Note Form [facility]: The patient was admitted with vague spells  She did have PAF unrelated to these  She would develop acute weakness and a pressure in the back of her neck  She was discharged on amiodarone and less BP meds  She states her facility is very hot  She was discharged 10 days ago and has had some spells but these are less pronounced and shorter than they were  The RN did note her BP to be elevated shortly after one with a HR of 60  She denies dyspnea or chest pain  She denies edema  She denies palpitations   She denies dizziness (just gets profound weakness)  Georgette Ormond She was getting nightmares which are better since she reduced amiodarone      Review of Systems  Cardiology Female ROS:     Cardiac: rhythm problems-- and-- palpitations present , but-- no chest pain,-- no fainting/blackouts,-- no heart murmur present-- and-- no signs of swelling     Skin: No complaints of nonhealing sores or skin rash    Genitourinary: frequent urination at night-- and-- post menopausal, but-- no recurrent urinary tract infections,-- no blood in urine,-- no kidney stones,-- no loss of bladder control,-- no kidney problems,-- no birth control or hormone replacement therapy-- and-- not pregnant   Psychological: anxiety, but-- no depression,-- no panic attacks,-- no difficulty concentrating-- and-- no palpitations present  General: trouble sleeping-- and-- lack of energy/fatigue, but-- no appetite changes,-- no changes in weight,-- no fever,-- no night sweats-- and-- no frequent infections  Respiratory: No complaints of shortness of breath, cough with sputum, or wheezing  HEENT: No complaints of serious problems, hearing problems, nose problems, throat problems, or snoring  Gastrointestinal: No complaints of liver problems, nausea, vomiting, heartburn, constipation, bloody stools, diarrhea, problems swallowing, adbominal pain, or rectal bleeding  Hematologic: No complaints of bleeding disorders, anemia, blood clots, or excessive brusing  Neurological: No complaints of numbness, tingling, dizziness, weakness, seizures, headaches, syncope or fainting, AM fatigue, daytime sleepiness, no witnessed apnea episodes  Musculoskeletal: back pain, but-- no arthritis-- and-- no swelling/pain   ROS Reviewed:   ROS reviewed  Active Problems  Problems    1  Adult hypothyroidism (244 9) (E03 9)   2  Anxiety and depression (300 00,311) (F41 8)   3  Atrial fibrillation with rapid ventricular response (427 31) (I48 91)   4  Benign essential HTN (401 1) (I10)   5  Mixed hyperlipidemia (272 2) (E78 2)   6  Need for pneumococcal vaccination (V03 82) (Z23)   7  Pallor (782 61) (R23 1)   8  Sick sinus syndrome (427 81) (I49 5)   9  Spinal stenosis of lumbar region (724 02) (M48 061)    Past Medical History  Problems    1  History of hyperlipidemia (V12 29) (Z86 39)   2  History of hypertension (V12 59) (Z86 79)   3   History of spinal stenosis (V13 59) (Z87 39)   4  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)  Active Problems And Past Medical History Reviewed: The active problems and past medical history were reviewed and updated today  Surgical History  Problems    1  Denied: History of Recent Surgery  Surgical History Reviewed: The surgical history was reviewed and updated today  Family History  Father    1  Family history of cardiac pacemaker (V17 49) (Z84 89)  Family History Reviewed: The family history was reviewed and updated today  Social History  Problems    · Housewife or homemaker   · Never a smoker   · Social drinker (V49 89) (Z78 9)  Social History Reviewed: The social history was reviewed and updated today  The social history was reviewed and is unchanged  Current Meds   1  Amiodarone HCl - 200 MG Oral Tablet; TAKE 1 TABLET DAILY; Therapy: (Recorded:26Oct2017) to Recorded   2  AmLODIPine Besylate 5 MG Oral Tablet; TAKE 1 TABLET DAILY  Requested for:   [id number]; Last [id number] Ordered   3  Aspirin 81 MG CAPS; take 1 capsule daily; Therapy: (Recorded:26Oct2017) to Recorded   4  Aspirin 81 MG Oral Tablet Delayed Release; Take 1 tablet daily; Therapy: [id number] to () Recorded   5  Levothyroxine Sodium 75 MCG Oral Tablet; TAKE 1/2 TABLET DAILY; Therapy: [id number] to )  Requested for: 26Oct2017; Last   Rx:26Oct2017 Ordered   6  Metoprolol Tartrate 25 MG Oral Tablet; TAKE 1/2 TABLET EVERY 12 HOURS; Therapy: (Recorded:26Oct2017) to Recorded   7  Sertraline HCl - 25 MG Oral Tablet; one P O  in am with breakfast;   Therapy: [id number] to (Last [id number])  Requested for: [id number] Ordered  Medication List Reviewed: The medication list was reviewed and updated today  Allergies  Medication    1  Cozaar   2  HydroCHLOROthiazide TABS   3   Penicillins    Vitals  Vital Signs    Recorded: 40EOH8371 03:50PM   Heart Rate 55 Systolic 350   Diastolic 72   Weight 472 lb 4 oz   BMI Calculated 21 06     Physical Exam    Constitutional   General appearance: Abnormal  -- thin elderly WF in NAD  Eyes   Conjunctiva and Sclera examination: Conjunctiva pink, sclera anicteric  Ears, Nose, Mouth, and Throat - Oropharynx: Clear, nares are clear, mucous membranes are moist    Neck   Neck and thyroid: Normal, supple, trachea midline, no thyromegaly  Pulmonary   Auscultation of lungs: Clear to auscultation, no rales, no rhonchi, no wheezing, good air movement  Cardiovascular   Auscultation of heart: Abnormal  -- Regular with grade 1 systolic murmur at base    no diastolic murmur rub or gallop  Carotid pulses: Normal, 2+ bilaterally  Pedal pulses: Normal, 2+ bilaterally  Examination of extremities for edema and/or varicosities: Abnormal  -- trace edema of the LEs  Abdomen   Abdomen: Non-tender and no distention  Liver and spleen: No hepatomegaly or splenomegaly  Musculoskeletal Gait and station: Abnormal -- walker  -- Digits and nails: Normal without clubbing or cyanosis  -- Inspection/palpation of joints, bones, and muscles: Abnormal -- kyphosis  Skin - Skin and subcutaneous tissue: Normal without rashes or lesions  Skin is warm and well perfused, normal turgor  Neurologic - Cranial nerves: II - XII intact  -- Sensation: No sensory loss  Psychiatric - Orientation to person, place, and time: Normal -- Mood and affect: Normal       Future Appointments    Date/Time Provider Specialty Site   11/28/2017 02:30 PM Jack Nowak, [address] [place] [address] [facility]   11/28/2017 03:15 PM BRAD King   [facility]   13/73/8597 18:47 AM Hailey Pineda, [address] [place] [address]   38/59/9117 68:10 AM [facility]     Signatures   Electronically signed by : BRAD Bacon ; Oct 30 2017  4:23PM EST (Author)

## 2017-11-02 DIAGNOSIS — E03.9 HYPOTHYROIDISM: ICD-10-CM

## 2017-11-03 ENCOUNTER — GENERIC CONVERSION - ENCOUNTER (OUTPATIENT)
Dept: OTHER | Facility: OTHER | Age: 82
End: 2017-11-03

## 2017-12-04 DIAGNOSIS — E03.9 HYPOTHYROIDISM: ICD-10-CM

## 2017-12-11 ENCOUNTER — ALLSCRIPTS OFFICE VISIT (OUTPATIENT)
Dept: OTHER | Facility: OTHER | Age: 82
End: 2017-12-11

## 2017-12-12 NOTE — PROGRESS NOTES
Assessment    1  Atrial fibrillation with rapid ventricular response (427 31) (I48 91)   2  Benign essential HTN (401 1) (I10)   · Amlodipine discontinued at office visit December 11, 0989 by Maddie Alejo DO   3  Adult hypothyroidism (244 9) (E03 9)    Plan  Adult hypothyroidism, Atrial fibrillation with rapid ventricular response, Benign essentialHTN    · Continue with our present treatment plan ; Status:Complete;   Done: [id number]   · Follow-up visit in 4 Months Evaluation and Treatment  Follow-up  Status: Complete Done: [id number]    Discussion/Summary    Alexy Roth is here for follow-up on atrial fibrillation admission  She is inquiring whether any of her meds can be adjusted or discontinued  Current dose of thyroid is at goal  She is on amiodarone and metoprolol for rate control as well as metoprolol for hypertension diagnosis  Today her blood pressure is at goal and I believe we can safely TRY HER OFF THE AMLODIPINE as it is causing her some edema  rate was 60 bpm sitting in 48 bpm supine  The metoprolol is a fairly low dose at a total of 25 milligrams daily  I will leave that up to Dr Jonah Varela whether that can be adjusted down  The patient was counseled regarding diagnostic results,-- instructions for management,-- impressions,-- importance of compliance with treatment  total time of encounter was 25 minutes-- and-- 50% minutes was spent counseling  Chief Complaint  had another episode where she was weak for 2 hours, feels like her legs and ankles are swollen on and off   Patient is here today for follow up of chronic conditions described in HPI  History of Present Illness  This is an office visit for an 59-year-old spry female  We are following her for atrial fibrillation diagnosed after an episode of syncope and admission to [facility]  Incidentally she was found ever cerebral aneurysm  Her blood pressure meds were adjusted   She is on amiodarone and that has been adjusted downward  She sees Dr Jens Frank next week  is complaining of some edema of the legs this is likely due to the amlodipine  The patient presents with permanent atrial fibrillation  The treatment strategy for this patient is rhythm control  She states her atrial fibrillation has been well controlled since the last visit  Comorbid Illnesses: HTN  Symptoms: stable Occasional âweak spells â  The patient presents with complaints of syncope  Symptoms are resolved  Had declined anticoagulation  Risks: increased risk for falling  The patient is due for thyroid function tests  The patient is being seen for follow-up of central hypothyroidism  The patient reports doing well  The patient is currently asymptomatic  Associated symptoms: no myalgias  Medications:  the patient is adherent to her medication regimen  Due for: utd  The patient presents for follow-up of essential hypertension  The patient states she has been stable with her blood pressure control since the last visit  Comorbid Illnesses: Atrial fibrillation  Symptoms: The patient is currently asymptomatic  stable Weakness episode  Home monitoring: The patient checks her blood pressure sporadically  Medications: the patient is adherent with her medication regimen  Review of Systems   Constitutional: feeling tired, but-- no recent weight loss  ENT: no nasal discharge  Cardiovascular: no chest pain-- and-- no palpitations  Respiratory: no cough-- and-- no shortness of breath during exertion  Gastrointestinal: constipation-- and-- âFrom meds â  Genitourinary: no dysuria-- and-- no incontinence  Musculoskeletal: no arthralgias  Neurological: Weak spells 2 episodes none with syncope or presyncope, but-- no headache,-- no confusion-- and-- no fainting  ROS reviewed  Active Problems  1  Adult hypothyroidism (244 9) (E03 9)   2  Atrial fibrillation with rapid ventricular response (427 31) (I48 91)   3   Benign essential HTN (401 1) (I10)   4  Hypercholesterolemia (272 0) (E78 00)   5  Hypertension, unspecified type (401 9) (I10)   6  Mixed hyperlipidemia (272 2) (E78 2)   7  Need for pneumococcal vaccination (V03 82) (Z23)   8  Paroxysmal atrial fibrillation (427 31) (I48 0)   9  Pulse irregularity (785 9) (R09 89)   10  Seasonal allergies (477 9) (J30 2)   11  Sick sinus syndrome (427 81) (I49 5)   12  Spinal stenosis of lumbar region (724 02) (M48 061)    Past Medical History  1  History of Anxiety and depression (300 00,311) (F41 8)   2  History of fainting (V15 89) (Z91 89)   3  History of hyperlipidemia (V12 29) (Z86 39)   4  History of pneumonia (V12 61) (Z87 01)   5  History of spinal stenosis (V13 59) (Z87 39)   6  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)    The active problems and past medical history were reviewed and updated today  Surgical History  1  Denied: History of Recent Surgery    The surgical history was reviewed and updated today  Family History  Father    1  Family history of cardiac pacemaker (V17 49) (Z84 89)  Brother    2  Family history of pancreatic cancer (V16 0) (Z80 0)    The family history was reviewed and updated today  Social History     · Four children   · High school graduate   · Housewife or homemaker   · Lives with spouse   · Never a smoker   · Social drinker (V49 89) (Z78 9)  The social history was reviewed and updated today  Current Meds   1  Amiodarone HCl - 100 MG Oral Tablet; TAKE 1 TABLET DAILY  Requested for: [id number]; Last Rx:03Nov2017 Ordered   2  Aspirin 81 MG CAPS; take 1 capsule daily; Therapy: (Recorded:26Oct2017) to Recorded   3  Levothyroxine Sodium 75 MCG Oral Tablet; TAKE 1 TABLET DAILY AS DIRECTED; Therapy: 32PUC5282 to (Evaluate:24Mar2018)  Requested for: 46BBW0363; Last Rx:24Nov2017 Ordered   4   Metoprolol Tartrate 25 MG Oral Tablet; TAKE 1/2 TABLET EVERY 12 HOURS  Requested for: 77CVQ4355; Last Rx:94Ilg6924; Status: ACTIVE - Retrospective By Protocol Authorization Ordered   5  Sertraline HCl - 25 MG Oral Tablet; one P O  in am with breakfast; Therapy: 19ULU4762 to (Last Rx:26Oct2017)  Requested for: 26Oct2017 Ordered    The medication list was reviewed and updated today  Allergies  1  Cozaar   2  HydroCHLOROthiazide TABS   3  Penicillins    Vitals  Vital Signs    Recorded: 11Dec2017 10:41AM Recorded: 11Dec2017 09:50AM   Heart Rate 60, R Radial    Pulse Quality Regular, R Radial    Systolic 183, RUE, Sitting    Diastolic 60, RUE, Sitting    BP Comments the pulse rate was 48 in the supine position    Height  4 ft 11 in   Weight  104 lb 6 oz   BMI Calculated  21 08   BSA Calculated  1 4       Physical Exam   Constitutional  General appearance: No acute distress, well appearing and well nourished  well developed,-- within normal limits of ideal weight-- and-- appears younger than stated age  Eyes  Pupils and irises: Equal, round and reactive to light  Ears, Nose, Mouth, and Throat  Otoscopic examination: Tympanic membranes translucent with normal light reflex  Canals patent without erythema  Nasal mucosa, septum, and turbinates: Normal without edema or erythema  Oropharynx: Normal with no erythema, edema, exudate or lesions  Pulmonary  Auscultation of lungs: Clear to auscultation  Cardiovascular  Auscultation of heart: Normal rate and rhythm, normal S1 and S2, without murmurs  The heart rate was bradycardic  A grade 2 systolic murmur was heard at the LLSB  Examination of extremities for edema and/or varicosities: Abnormal   bilateral ankle 1+ pitting edema  Abdomen  Abdomen: Non-tender, no masses  Musculoskeletal  Gait and station: Normal  -- Patient uses walker  Neurologic  Sensation: No sensory loss     Psychiatric  Orientation to person, place, and time: Normal    Mood and affect: Normal          Results/Data    Lab work from December 5th shows a TSH of 2 21 which is markedly improved from post hospitalization of 8 96 ([facility] lab)  Reviewed kidney function from inpatient and GFR was equal to 69  BUN equals 16 and creatinine 0 78  CBC was normal       Future Appointments    Date/Time Provider Specialty Site   12/20/2017 10:20 AM BRAD Edward  Cardiology  CARDIOLOGY  [place]   01/16/2018 01:00 PM Bert Rider [facility]   01/16/2018 02:00 PM BRAD Nelson   Neurosurgery [facility]   86/93/9759 47:54 AM Noralee Hodgkin, DO Family Medicine [facility]       Signatures   Electronically signed by : Ted Suarez DO; Dec 11 1039  2:13PM EST                       (Author)

## 2017-12-20 ENCOUNTER — ALLSCRIPTS OFFICE VISIT (OUTPATIENT)
Dept: OTHER | Facility: OTHER | Age: 82
End: 2017-12-20

## 2017-12-21 NOTE — PROGRESS NOTES
Assessment   Assessed    1  Benign essential HTN (401 1) (I10)   2  Paroxysmal atrial fibrillation (427 31) (I48 0)   3  Junctional (jarett) bradycardia (427 89) (R00 1)   4  Edema of lower extremity (782 3) (R60 0)    Plan   Paroxysmal atrial fibrillation    · Follow-up visit in 4 Months Evaluation and Treatment  Follow-up  Status: Hold For -    Scheduling  Requested for: [id number]   Ordered; For: Paroxysmal atrial fibrillation; Ordered By: Nikolai Spivey Performed:  Due: 34LUA3911    Discussion/Summary   Cardiology Discussion Summary Free Text Note Form [facility]: It is my impression that the patient has had resolution of her spells of episodic weakness  Her blood pressure today on very low-dose metoprolol was acceptable  I suspect she was having drops in blood pressure from being on multiple antihypertensive medications  Recently her amlodipine was stopped due to edema  Interestingly she was on this in combination with an ACE-inhibitor and did not have this level of edema  I find no evidence for congestive heart failure otherwise  She is in a junctional rhythm at a rate of 55 beats per minute  For this reason I stopped her metoprolol today  She will continue amiodarone at 100 mg daily  Perhaps loss of atrioventricular synchrony has something to do with her edema  I will see her again in 4 months time  Chief Complaint   Chief Complaint Free Text Note Form: 2 month FU for PAF, vague spells not related to AFIB but possibly related to dips in BP   Amanda Dominguez also has benign HTN    Chief Complaint Chronic Condition [facility]: Patient is here today for follow up of chronic conditions described in HPI  History of Present Illness   Cardiology HPI Free Text Note Form [facility]: Since the patient's last visit she has done quite well  She denies chest pain or shortness of breath  She denies any of her spells  She did have a fair amount of edema and recently her amlodipine was stopped for this reason   It has improved but has not gone away  She denies lightheadedness  Review of Systems   Cardiology Female ROS:         Cardiac: rhythm problems-- and-- has swelling in the legs, but-- no chest pain,-- no fainting/blackouts,-- no heart murmur present-- and-- no palpitations present  Skin: No complaints of nonhealing sores or skin rash  Genitourinary: frequent urination at night-- (2-4x per night)-- and-- post menopausal, but-- no recurrent urinary tract infections,-- no difficult urination,-- no blood in urine,-- no kidney stones,-- no loss of bladder control,-- no kidney problems,-- no birth control or hormone replacement therapy-- and-- not pregnant      Psychological: anxiety, but-- no depression,-- no panic attacks,-- no difficulty concentrating-- and-- no palpitations present  General: No complaints of trouble sleeping, lack of energy, fatigue, appetite changes, weight changes, fever, frequent infections, or night sweats  Respiratory: No complaints of shortness of breath, cough with sputum, or wheezing  HEENT: No complaints of serious problems, hearing problems, nose problems, throat problems, or snoring  Gastrointestinal: constipation, but-- no liver problems,-- no nausea,-- no vomiting,-- no heartburn,-- no bloody stools,-- no diarrhea,-- no abdonimal pain-- and-- no rectal bleeding      Hematologic: No complaints of bleeding disorders, anemia, blood clots, or excessive brusing  Neurological: No complaints of numbness, tingling, dizziness, weakness, seizures, headaches, syncope or fainting, AM fatigue, daytime sleepiness, no witnessed apnea episodes  Musculoskeletal: back pain, but-- no arthritis-- and-- no swelling/pain    ROS Reviewed:    ROS reviewed  Active Problems   Problems    1  Adult hypothyroidism (244 9) (E03 9)   2  Atrial fibrillation with rapid ventricular response (427 31) (I48 91)   3  Benign essential HTN (401 1) (I10)   4  Hypercholesterolemia (272 0) (E78 00)   5  Hypertension, unspecified type (401 9) (I10)   6  Mixed hyperlipidemia (272 2) (E78 2)   7  Need for pneumococcal vaccination (V03 82) (Z23)   8  Paroxysmal atrial fibrillation (427 31) (I48 0)   9  Pulse irregularity (785 9) (R09 89)   10  Seasonal allergies (477 9) (J30 2)   11  Sick sinus syndrome (427 81) (I49 5)   12  Spinal stenosis of lumbar region (724 02) (M48 061)    Past Medical History   Problems    1  History of Anxiety and depression (300 00,311) (F41 8)   2  History of fainting (V15 89) (Z91 89)   3  History of hyperlipidemia (V12 29) (Z86 39)   4  History of pneumonia (V12 61) (Z87 01)   5  History of spinal stenosis (V13 59) (Z87 39)   6  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)  Active Problems And Past Medical History Reviewed: The active problems and past medical history were reviewed and updated today  Surgical History   Problems    1  Denied: History of Recent Surgery  Surgical History Reviewed: The surgical history was reviewed and updated today  Family History   Father    1  Family history of cardiac pacemaker (V17 49) (Z84 89)  Brother    2  Family history of pancreatic cancer (V16 0) (Z80 0)  Family History Reviewed: The family history was reviewed and updated today  Social History   Problems    · Four children   · High school graduate   · Housewife or homemaker   · Lives with spouse   · Never a smoker   · Social drinker (V49 89) (Z78 9)  Social History Reviewed: The social history was reviewed and updated today  The social history was reviewed and is unchanged  Current Meds    1  Amiodarone HCl - 100 MG Oral Tablet; TAKE 1 TABLET DAILY  Requested for:     58IYD6596; Last Rx:03Nov2017 Ordered   2  Aspirin 81 MG CAPS; take 1 capsule daily; Therapy: (Recorded:26Oct2017) to Recorded   3  Levothyroxine Sodium 75 MCG Oral Tablet; TAKE 1 TABLET DAILY AS DIRECTED;      Therapy: 20FGY4421 to ([phone])  Requested for: 62AYU8941; Last     Rx:64Qum1382 Ordered   4  Metoprolol Tartrate 25 MG Oral Tablet; TAKE 1/2 TABLET EVERY 12 HOURS  Requested     for: 83ICL2008; Last Rx:51Sqy4534 Ordered   5  Sertraline HCl - 25 MG Oral Tablet; one P O  in am with breakfast;     Therapy: 85JYT9656 to (Last UJ:36VJA1072)  Requested for: 45FPE6708 Ordered  Medication List Reviewed: The medication list was reviewed and updated today  Allergies   Medication    1  Cozaar   2  HydroCHLOROthiazide TABS   3  Penicillins    Vitals   Vital Signs    Recorded: 20Dec2017 10:12AM   Heart Rate 56   Pulse Quality Normal, L Radial   Respiration Quality Normal   Respiration 16   Systolic 074   Diastolic 72   Height 4 ft 11 in   Weight 105 lb    BMI Calculated 21 21   BSA Calculated 1 4     Physical Exam        Constitutional      General appearance: Abnormal  -- thin elderly WF in NAD  Eyes      Conjunctiva and Sclera examination: Conjunctiva pink, sclera anicteric  Ears, Nose, Mouth, and Throat - Oropharynx: Clear, nares are clear, mucous membranes are moist       Neck      Neck and thyroid: Normal, supple, trachea midline, no thyromegaly  Pulmonary      Auscultation of lungs: Clear to auscultation, no rales, no rhonchi, no wheezing, good air movement  Cardiovascular      Auscultation of heart: Abnormal  -- Regular with grade 1 systolic murmur at base    no diastolic murmur rub or gallop  Carotid pulses: Normal, 2+ bilaterally  Pedal pulses: Normal, 2+ bilaterally  Examination of extremities for edema and/or varicosities: Abnormal  -- 1-2+ edema  Abdomen      Abdomen: Non-tender and no distention  Liver and spleen: No hepatomegaly or splenomegaly  Musculoskeletal Gait and station: Abnormal -- walker  -- Digits and nails: Normal without clubbing or cyanosis  -- Inspection/palpation of joints, bones, and muscles: Abnormal -- kyphosis        Skin - Skin and subcutaneous tissue: Normal without rashes or lesions  Skin is warm and well perfused, normal turgor  Neurologic - Cranial nerves: II - XII intact  -- Sensation: No sensory loss  Psychiatric - Orientation to person, place, and time: Normal -- Mood and affect: Normal       Future Appointments      Date/Time Provider Specialty Site   01/16/2018 01:00 PM Luann Menon [facility] Neurosurgery [facility]   01/16/2018 02:00 PM BRAD Mathis   Neurosurgery [facility]   70/02/1720 47:24 AM Junior Kohler DO Family Medicine [facility]     Signatures    Electronically signed by : BRAD Rick ; Dec 20 2017 10:57AM EST                       (Author)

## 2018-01-11 NOTE — MISCELLANEOUS
Assessment    1  Adult hypothyroidism (244 9) (E03 9)   2  Benign essential HTN (401 1) (I10)   3  Atrial fibrillation with rapid ventricular response (427 31) (I48 91)   4  Syncope (780 2) (R55)    Plan  Adult hypothyroidism    · From  Levothyroxine Sodium 25 MCG Oral Tablet TAKE 1 TABLET DAILY AS  DIRECTED To Levothyroxine Sodium 75 MCG Oral Tablet TAKE 1/2 TABLET DAILY   Rx By: Ewelina Medicine; Dispense: 30 Days ; #:30 Tablet; Refill: 3; For: Adult hypothyroidism; CARLOZ = N; Record   · (1) TSH; Status:Complete;   Done: 63FBD6269   Perform:[facility]; U:[id number]; Ordered; For:Adult hypothyroidism; Ordered By:Kacie Vasquez; Anxiety and depression    · Sertraline HCl - 25 MG Oral Tablet; one P O  in am with breakfast   Rx By: Ewelina Medicine; Dispense: 0 Days ; #:1 Tablet; Refill: 3; For: Anxiety and depression; CARLOZ = N; Verified Transmission to [address] [place] [address]; Last Updated By: System, [employer]; 10/26/2017 3:59:35 PM  Benign essential HTN    · From  AmLODIPine Besylate 2 5 MG Oral Tablet TAKE 1 TABLET DAILY To  AmLODIPine Besylate 5 MG Oral Tablet TAKE 1 TABLET DAILY   Rx By: Ewelina Medicine; Dispense: 30 Days ; #:30 Tablet; Refill: 0; For: Benign essential HTN; CARLOZ = N; Record; Msg to Pharmacy: D/c the amlodipine -benazipril (correct dose is 2 5mg daily    Discussion/Summary  Discussion Summary:   Patient declines flu shot today  We have initiated the surgically back on board  All medications were reconciled  TSH will be drawn the 1st week of November  Follow-up made for December  Counseling Documentation With Imm: The patient was counseled regarding diagnostic results, instructions for management, impressions, importance of compliance with treatment  total time of encounter was 30 minutes and 50% minutes was spent counseling        Chief Complaint  Chief Complaint Free Text Note Form: LAURENCE, discuss medications      History of Present Illness  TCM Communication [facility]: The patient is being contacted for follow-up after hospitalization  She was hospitalized at [address]  The date of admission: 10/13/2017, date of discharge: 10/20/2017  Diagnosis: Syncope, episode of A-fib  She was discharged to an assisted living facility  Medications reviewed and updated today  She scheduled a follow up appointment  Follow-up appointments with other specialists: Neurologist and Cardiologist  The patient is currently asymptomatic  Counseling was provided to patient's family  Daughter  Communication performed and completed by [facility]   HPI: Patient is here for follow-up from hospitalization at [facility]   The diagnosis was of rapid atrial fibrillation and that is now under control with metoprolol and amiodarone  She has follow-up with Cardiology  Anticoagulation was discussed and risk benefit  Patient's hypertension is well controlled  She did pass out from likely that a with the a  Neurology consulted and they felt same thing that her syncope was rate related  She also had incidental finding of cerebral aneurysm and is set to follow up with Neurosurgery to monitor this  Her thyroid level was extremely hypo and medications were adjusted  She will have a TSH done the 1st week in November  And patient had not been on sertraline and this may have be compounding to some sleep issues      Review of Systems  Complete-Female:   Constitutional: feeling tired  ENT: no nasal discharge  Cardiovascular: no chest pain and no palpitations  Respiratory: no cough  Gastrointestinal: no abdominal pain  Genitourinary: no incontinence  Musculoskeletal: no arthralgias  Integumentary: no rashes  Neurological: no headache  Psychiatric: sleep disturbances and Having some bad dreams  ROS Reviewed:   ROS reviewed  Active Problems    1  Adult hypothyroidism (244 9) (E03 9)   2  Anxiety and depression (300 00,311) (F41 8)   3  Benign essential HTN (401 1) (I10)   4   Mixed hyperlipidemia (272 2) (E78 2) 5  Need for pneumococcal vaccination (V03 82) (Z23)   6  Pallor (782 61) (R23 1)   7  Sick sinus syndrome (427 81) (I49 5)   8  Spinal stenosis of lumbar region (724 02) (M48 061)   9  Syncope (780 2) (R55)    Past Medical History    1  History of hyperlipidemia (V12 29) (Z86 39)   2  History of hypertension (V12 59) (Z86 79)   3  History of spinal stenosis (V13 59) (Z87 39)   4  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)    Surgical History    1  Denied: History of Recent Surgery  Surgical History Reviewed: The surgical history was reviewed and updated today  Family History  Father    1  Family history of cardiac pacemaker (V17 49) (Z84 89)  Family History Reviewed: The family history was reviewed and updated today  Social History    · Housewife or homemaker   · Never a smoker   · Social drinker (V49 89) (Z78 9)  Social History Reviewed: The social history was reviewed and updated today  Current Meds   1  Amiodarone HCl - 200 MG Oral Tablet; TAKE 1 TABLET DAILY; Therapy: (Recorded:26Oct2017) to Recorded   2  AmLODIPine Besylate 2 5 MG Oral Tablet; TAKE 1 TABLET DAILY  Requested for:   93Smn7352; Last Rx:70Izi5777 Ordered   3  Aspirin 81 MG CAPS; take 1 capsule daily; Therapy: (Recorded:26Oct2017) to Recorded   4  Levothyroxine Sodium 25 MCG Oral Tablet; TAKE 1 TABLET DAILY AS DIRECTED; Therapy: 47JHZ6816 to [phone])  Requested for: 57Wum7575; Last   Rx:34Jdw2722 Ordered   5  Metoprolol Tartrate 25 MG Oral Tablet; TAKE 1/2 TABLET EVERY 12 HOURS; Therapy: (Recorded:26Oct2017) to Recorded   6  Sertraline HCl - 25 MG Oral Tablet; one P O  in am with breakfast;   Therapy: 86HRT6347 to (Last Rx:06Mar2017)  Requested for: 75UUR7486 Ordered  Medication List Reviewed: The medication list was reviewed and updated today  Allergies    1  Cozaar   2  HydroCHLOROthiazide TABS   3   Penicillins    Vitals  Signs   Recorded: 26CBI3254 31:42LE   Systolic: 255  Diastolic: 70  Recorded: 10VVU5858 02:56PM   Height: 4 ft 11 in  Weight: 105 lb 4 oz  BMI Calculated: 21 26  BSA Calculated: 1 4    Physical Exam    Constitutional   General appearance: No acute distress, well appearing and well nourished  Ears, Nose, Mouth, and Throat   Otoscopic examination: Tympanic membranes translucent with normal light reflex  Canals patent without erythema  Nasal mucosa, septum, and turbinates: Normal without edema or erythema  Oropharynx: Normal with no erythema, edema, exudate or lesions  Pulmonary   Auscultation of lungs: Clear to auscultation  Cardiovascular   Auscultation of heart: Normal rate and rhythm, normal S1 and S2, without murmurs  Examination of extremities for edema and/or varicosities: Normal     Abdomen   Abdomen: Non-tender, no masses  Liver and spleen: No hepatomegaly or splenomegaly  Musculoskeletal   Gait and station: Normal     Neurologic   Sensation: No sensory loss  Psychiatric   Orientation to person, place, and time: Normal     Mood and affect: Normal          Future Appointments    Date/Time Provider Specialty Site   10/30/2017 03:40 PM BRAD Ortega   Cardiology  CARDIOLOGY  [place]   85/41/8594 18:35 AM Devin Baird DO Family Medicine [facility]   68/92/3515 83:83 AM Agus Barros Family Medicine [facility]     Signatures   Electronically signed by : Dane Larios DO; Oct 27 4125 12:56PM EST                       (Author)

## 2018-01-11 NOTE — PROGRESS NOTES
Assessment    1  Sick sinus syndrome (427 81) (I49 5)   · per old records   2  Need for pneumococcal vaccination (V03 82) (Z23)   3  Encounter for preventive health examination (V70 0) (Z00 00)    Plan  Benign essential HTN    · Amlodipine Besy-Benazepril HCl - 5-20 MG Oral Capsule   · AmLODIPine Besylate 2 5 MG Oral Tablet; TAKE 1 TABLET DAILY   · Olmesartan Medoxomil 20 MG Oral Tablet (Benicar); TAKE 1 TABLET DAILY  Need for pneumococcal vaccination    · Prevnar 13 Intramuscular Suspension  Sick sinus syndrome    · EKG/ECG- POC; Status:Complete;   Done: 61QDW4501 12:00AM    Discussion/Summary  Impression: Subsequent Annual Wellness Visit, with preventive exam as well as age and risk appropriate counseling completed  Cardiovascular screening and counseling: the risks and benefits of screening were discussed, screening is current and EKG recommended  Diabetes screening and counseling: screening is current  Colorectal cancer screening and counseling: screening not indicated  Breast cancer screening and counseling: screening not indicated  Cervical cancer screening and counseling: screening not indicated  Osteoporosis screening and counseling: the risks and benefits of screening were discussed  Glaucoma screening and counseling: ophthalmologist referral  Immunizations: the patient declines the influenza vaccination, pneumococcal vaccine due today, the patient declines the Zostavax vaccine and the patient declines the Tdap vaccine  Advance Directive Planning: complete and up to date  Patient Discussion: follow-up visit needed in one year  Chief Complaint  Medicare wellness, no concerns today      History of Present Illness  HPI: TSH BW is normal  Same Rx   Welcome to Medicare and Wellness Visits: The patient is being seen for the initial annual wellness visit  Medicare Screening and Risk Factors   Hospitalizations: no previous hospitalizations    Medicare Screening Tests Risk Questions   Drug and Alcohol Use: The patient has never smoked cigarettes and has never used smokeless tobacco  The patient reports drinking 2-3 drinks per week  Alcohol concern:   The patient has no concerns about alcohol abuse  She has never used illicit drugs  Diet and Physical Activity: Current diet includes well balanced meals, low salt food choices, 3-4 servings of fruit per day, 3-4 servings of vegetables per day, 1 servings of meat per day, 1 servings of whole grains per day, 1-2 servings of simple carbohydrates per day, 1 servings of dairy products per day, 1-2 cups of tea per day and 4-5 glasses of water per day  She exercises daily  Exercise: walking, stretching 45-60 minutes per day  Mood Disorder and Cognitive Impairment Screening: She denies feeling down, depressed, or hopeless over the past two weeks  She denies feeling little interest or pleasure in doing things over the past two weeks  Cognitive impairment screening: difficulty learning/retaining new information, denies difficulty with language and denies difficulty with behavior  Functional Ability/Level of Safety: Hearing is normal in the right ear, normal in the left ear and left ear is extra sensitive  She denies hearing difficulties  She does not use a hearing aid  Activities of daily living details: transportation help needed and needs help shopping, but does not need help using the phone, no meal preparation help needed, does not need help doing housework, does not need help doing laundry, does not need help managing medications and does not need help managing money  Fall risk factors: The patient fell 0 times in the past 12 months  Home safety risk factors:  no unfamiliar surroundings, no loose rugs, no poor household lighting, no uneven floors, no household clutter, grab bars in the bathroom and handrails on the stairs     Advance Directives: Advance directives: living will, durable power of  for health care directives and advance directives  Co-Managers and Medical Equipment/Suppliers: See Patient Care Team   Preventive Quality Program 65 and Older: Falls Risk: The patient fell 1 times in the past 12 months  The patient currently has no urinary incontinence symptoms  Date of last glaucoma screen was unsure   Sick Sinus Syndrome (Brief): The patient is being seen for a routine clinic follow-up of and today on ausultation, I heard ? irregular irregular vs PAC's sick sinus syndrome  Symptoms:  no near syncope  The patient is currently asymptomatic  Associated symptoms:  no dizziness, no chest pain, no shortness of breath and no lower extremity swelling  She was previously evaluated on old records BUT no EKG seen in records  Review of Systems    Constitutional: no fever  Eyes: no eye pain  ENT: hearing loss, but no earache  Cardiovascular: no chest pain and no palpitations  Respiratory: no shortness of breath  Gastrointestinal: no abdominal pain  Musculoskeletal: no diffuse joint pain  Integumentary and Breasts: no rashes  Neurological: no headache  Psychiatric: mood is good on the mediucatios  Stable and tolerating sertraline, but no insomnia and no irritability  Active Problems    1  Adult hypothyroidism (244 9) (E03 9)   2  Anxiety and depression (300 00,311) (F41 8)   3  Benign essential HTN (401 1) (I10)   4  Mixed hyperlipidemia (272 2) (E78 2)   5  Pallor (782 61) (R23 1)   6  Spinal stenosis of lumbar region (724 02) (M48 06)    Past Medical History    · History of hyperlipidemia (V12 29) (Z86 39)   · History of hypertension (V12 59) (Z86 79)   · History of spinal stenosis (V13 59) (Z87 39)   · 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)    The active problems and past medical history were reviewed and updated today  Surgical History    · Denied: History of Recent Surgery    The surgical history was reviewed and updated today         Family History  Father    · Family history of cardiac pacemaker (V17 49) (Z84 89)    The family history was reviewed and updated today  Social History    · Housewife or homemaker   · Never a smoker   · Social drinker (V49 89) (Z78 9)  The social history was reviewed and updated today  Current Meds   1  Amlodipine Besy-Benazepril HCl - 5-20 MG Oral Capsule; Therapy: (Recorded:07Mar2017) to Recorded   2  AmLODIPine Besylate 5 MG Oral Tablet; Therapy: (Recorded:11Sep2017) to Recorded   3  Levothyroxine Sodium 25 MCG Oral Tablet; TAKE 1 TABLET DAILY AS DIRECTED; Therapy: 15CGL0535 to [phone])  Requested for: 88Bhx1097; Last   Rx:20Kkd7591 Ordered   4  Olmesartan Medoxomil 20 MG Oral Tablet; TAKE 1 TABLET DAILY; Therapy: 83OCH6617 to (Evaluate:01Jun2017)  Requested for: 40TTG7998; Last   Rx:54Hkx8690 Ordered   5  Sertraline HCl - 25 MG Oral Tablet; one P O  in am with breakfast;   Therapy: 50TTT7134 to (Last Rx:06Mar2017)  Requested for: 77EZU1043 Ordered    The medication list was reviewed and updated today  Allergies    1  Cozaar   2  HydroCHLOROthiazide TABS   3  Penicillins    Immunizations   1    PPSV  11-Sep-2006  (75y)     Vitals  Signs    Systolic: 334  Diastolic: 80   Height: 4 ft 11 in  Weight: 98 lb 6 oz  BMI Calculated: 19 87  BSA Calculated: 1 36    Physical Exam    Constitutional   General appearance: No acute distress, well appearing and well nourished  well developed, appears healthy, within normal limits of ideal weight, appears younger than stated age and peteite  Eyes   Pupils and irises: Equal, round, reactive to light  Ears, Nose, Mouth, and Throat   Otoscopic examination: Tympanic membranes translucent with normal light reflex  Canals patent without erythema  Nasal mucosa, septum, and turbinates: Normal without edema or erythema  Oropharynx: Normal with no erythema, edema, exudate or lesions  Pulmonary   Auscultation of lungs: Clear to auscultation      Cardiovascular   Auscultation of heart: Normal rate and rhythm, normal S1 and S2, no murmurs  Peripheral vascular exam: Normal     Examination of extremities for edema and/or varicosities: Normal     Abdomen   Abdomen: Non-tender, no masses  Liver and spleen: No hepatomegaly or splenomegaly  Musculoskeletal   Gait and station: Normal     Neurologic   Sensation: No sensory loss  Psychiatric   Orientation to person, place, and time: Normal   Mental Status: oriented to person and oriented to place  Mood and affect: Normal   gets somewhat anxious and flustered about her ;s medications as she is the primary caregiver/medication dispenser for   Reasured her that she is dong a great job      Results/Data  EKG/ECG- POC 51STT9315 28:29KB Marce Sargent     Test Name Result Flag Reference   EKG/ECG 9/11/2017       A 12 lead ECG was performed and was normal  Ectopic Beats: atrial premature contractions are present  QRS: low voltage  Comparison to prior ECGs:  no prior ECGs were available for comparison        Future Appointments    Date/Time Provider Specialty Site   09/72/4297 37:91 AM Marce Sargent DO Family Medicine [facility]     Signatures   Electronically signed by : Romayne Chars, DO; Sep 12 2112  8:25AM EST                       (Author)

## 2018-01-12 NOTE — MISCELLANEOUS
Discussion/Summary  Discussion Summary:   Phone call from patient    had another spell-BP was elevated    felt her HR was 60 and "irregular"   tells me this is th pills    she had same exact sx in hospital   this is not the pills    feeling a bit better    since vitals ok she will stay at home        Signatures   Electronically signed by : BRAD Rubalcava ; Oct 27 2017  6:11PM EST                       (Author)

## 2018-01-13 VITALS
SYSTOLIC BLOOD PRESSURE: 142 MMHG | HEIGHT: 59 IN | DIASTOLIC BLOOD PRESSURE: 80 MMHG | BODY MASS INDEX: 19.83 KG/M2 | WEIGHT: 98.38 LBS

## 2018-01-13 VITALS
BODY MASS INDEX: 20.36 KG/M2 | HEART RATE: 55 BPM | DIASTOLIC BLOOD PRESSURE: 72 MMHG | WEIGHT: 104.25 LBS | SYSTOLIC BLOOD PRESSURE: 124 MMHG

## 2018-01-13 VITALS
DIASTOLIC BLOOD PRESSURE: 100 MMHG | OXYGEN SATURATION: 2 % | WEIGHT: 97.13 LBS | SYSTOLIC BLOOD PRESSURE: 150 MMHG | BODY MASS INDEX: 19.58 KG/M2 | HEIGHT: 59 IN

## 2018-01-14 VITALS
SYSTOLIC BLOOD PRESSURE: 148 MMHG | WEIGHT: 105.25 LBS | BODY MASS INDEX: 21.22 KG/M2 | DIASTOLIC BLOOD PRESSURE: 70 MMHG | HEIGHT: 59 IN

## 2018-01-16 NOTE — MISCELLANEOUS
Message   Date: 03 Nov 2017 1:27 PM EST, Recorded By: Ray Crow   Phone: [phone] (Home)   PC from [place] stating she is still not feeling well at all  She is weak, fatigued, her head gets hot and she feels she is "having a spell"  The meds she has been taking have been looked at, and adjusted by Dr Pepper Isidro on Oct 31  Looking over other notes in her chart, a neurology consult was recommended, but Gary does not know if her daughter made one for her yet, or not  Dr Pepper Isidro is on vacation, but after looking over her chart, I noticed a thyroid test that came back very abnormal, which her PCP ordered and was resulted yesterday  I asked her to follow up with Marialuisa Shaw, who ordered the test, for this may shed some light on her sypmtpms  She will call their office and see if she can get her results and advisement  Active Problems    1  Adult hypothyroidism (244 9) (E03 9)   2  Anxiety and depression (300 00,311) (F41 8)   3  Atrial fibrillation with rapid ventricular response (427 31) (I48 91)   4  Benign essential HTN (401 1) (I10)   5  Episodic weakness (728 87) (R53 1)   6  Mixed hyperlipidemia (272 2) (E78 2)   7  Need for pneumococcal vaccination (V03 82) (Z23)   8  Pallor (782 61) (R23 1)   9  Paroxysmal atrial fibrillation (427 31) (I48 0)   10  Sick sinus syndrome (427 81) (I49 5)   11  Spinal stenosis of lumbar region (724 02) (M48 061)    Current Meds   1  Amiodarone HCl - 100 MG Oral Tablet; TAKE 1 TABLET DAILY  Requested for:   95KSK7783; Last Rx:03Nov2017 Ordered   2  AmLODIPine Besylate 5 MG Oral Tablet; TAKE 1 TABLET DAILY  Requested for:   77KGM2959; Last Rx:30Oct2017 Ordered   3  Aspirin 81 MG CAPS; take 1 capsule daily; Therapy: (Recorded:26Oct2017) to Recorded   4  Aspirin 81 MG Oral Tablet Delayed Release; Take 1 tablet daily; Therapy: 38LOP1230 to ([phone]) Recorded   5   Levothyroxine Sodium 75 MCG Oral Tablet; TAKE 1/2 TABLET DAILY; Therapy: 24CUV3796 to )  Requested for: 26Oct2017; Last   Rx:26Oct2017 Ordered   6  Metoprolol Tartrate 25 MG Oral Tablet; TAKE 1/2 TABLET EVERY 12 HOURS  Requested   for: 42NHP3332; Last Rx:30Oct2017 Ordered   7  Sertraline HCl - 25 MG Oral Tablet; one P O  in am with breakfast;   Therapy: 79BMV6898 to (Last Rx:26Oct2017)  Requested for: 26Oct2017 Ordered    Allergies    1  Cozaar   2  HydroCHLOROthiazide TABS   3   Penicillins    Signatures   Electronically signed by : Michelle Schwartz, ; Nov  3 2017  1:35PM EST                       (Administrative)

## 2018-01-22 VITALS
HEIGHT: 59 IN | HEART RATE: 56 BPM | BODY MASS INDEX: 21.17 KG/M2 | WEIGHT: 105 LBS | DIASTOLIC BLOOD PRESSURE: 72 MMHG | SYSTOLIC BLOOD PRESSURE: 144 MMHG | RESPIRATION RATE: 16 BRPM

## 2018-01-22 VITALS
SYSTOLIC BLOOD PRESSURE: 122 MMHG | WEIGHT: 104.38 LBS | DIASTOLIC BLOOD PRESSURE: 60 MMHG | BODY MASS INDEX: 21.04 KG/M2 | HEIGHT: 59 IN | HEART RATE: 60 BPM

## 2018-03-26 DIAGNOSIS — E03.9 ACQUIRED HYPOTHYROIDISM: Primary | ICD-10-CM

## 2018-03-26 PROBLEM — I48.91 ATRIAL FIBRILLATION WITH RAPID VENTRICULAR RESPONSE (HCC): Status: ACTIVE | Noted: 2017-10-27

## 2018-03-26 PROBLEM — J30.2 SEASONAL ALLERGIES: Status: ACTIVE | Noted: 2017-11-20

## 2018-03-26 PROBLEM — R00.1 JUNCTIONAL (NODAL) BRADYCARDIA: Status: ACTIVE | Noted: 2017-12-20

## 2018-03-26 PROBLEM — I10 BENIGN ESSENTIAL HTN: Status: ACTIVE | Noted: 2017-03-07

## 2018-03-26 PROBLEM — I49.5 SICK SINUS SYNDROME (HCC): Status: ACTIVE | Noted: 2017-09-11

## 2018-03-26 PROBLEM — M48.061 SPINAL STENOSIS OF LUMBAR REGION: Status: ACTIVE | Noted: 2017-03-07

## 2018-03-26 RX ORDER — LEVOTHYROXINE SODIUM 0.07 MG/1
TABLET ORAL
Qty: 30 TABLET | Refills: 4 | Status: SHIPPED | OUTPATIENT
Start: 2018-03-26 | End: 2018-08-27 | Stop reason: SDUPTHER

## 2018-04-13 RX ORDER — AMLODIPINE BESYLATE AND BENAZEPRIL HYDROCHLORIDE 5; 20 MG/1; MG/1
CAPSULE ORAL
COMMUNITY
Start: 2018-03-08 | End: 2018-05-07 | Stop reason: SDUPTHER

## 2018-04-13 RX ORDER — AMIODARONE HYDROCHLORIDE 100 MG/1
TABLET ORAL
COMMUNITY
Start: 2018-03-23 | End: 2018-05-23 | Stop reason: SDUPTHER

## 2018-04-16 ENCOUNTER — OFFICE VISIT (OUTPATIENT)
Dept: FAMILY MEDICINE CLINIC | Facility: CLINIC | Age: 83
End: 2018-04-16
Payer: MEDICARE

## 2018-04-16 VITALS
SYSTOLIC BLOOD PRESSURE: 122 MMHG | HEIGHT: 60 IN | WEIGHT: 100.8 LBS | BODY MASS INDEX: 19.79 KG/M2 | DIASTOLIC BLOOD PRESSURE: 70 MMHG

## 2018-04-16 DIAGNOSIS — E03.9 ACQUIRED HYPOTHYROIDISM: ICD-10-CM

## 2018-04-16 DIAGNOSIS — I48.20 CHRONIC ATRIAL FIBRILLATION (HCC): ICD-10-CM

## 2018-04-16 DIAGNOSIS — I10 BENIGN ESSENTIAL HTN: Primary | ICD-10-CM

## 2018-04-16 PROBLEM — R55 SYNCOPE: Status: RESOLVED | Noted: 2017-10-14 | Resolved: 2018-04-16

## 2018-04-16 PROBLEM — I16.0 HYPERTENSIVE URGENCY: Status: RESOLVED | Noted: 2017-10-14 | Resolved: 2018-04-16

## 2018-04-16 PROCEDURE — 99214 OFFICE O/P EST MOD 30 MIN: CPT | Performed by: FAMILY MEDICINE

## 2018-04-16 NOTE — PROGRESS NOTES
Assessment/Plan:     Diagnoses and all orders for this visit:    Benign essential HTN  Comments:  Same regimen  Orders:  -     Comprehensive metabolic panel; Future    Acquired hypothyroidism  Comments:  TSH at goal repeat in October  Orders:  -     TSH, 3rd generation with T4 reflex; Future    Chronic atrial fibrillation (HCC)  Comments:  Stable rate and rhythm    Other orders  -     amiodarone 100 mg tablet;   -     amLODIPine-benazepril (LOTREL 5-20) 5-20 MG per capsule;   -     aspirin 81 MG tablet; Take 1 capsule by mouth daily        70-year-old female who is here for follow-up of blood pressure and thyroid  She follows up with Cardiology  She will keep on same medications at this time she will follow up in October     She declines flu shot  Time spent 25 min with greater than 50% counseling provided  Chief Complaint   Patient presents with    Follow-up     no concerns today       Patient ID: Teofilo Wright is a 80 y o  female  Pleasant 80year-old here for blood pressure check and review labs from December  Thyroid numbers are at goal   Patient has no specific complaints  She has no symptoms referable to her AFib  Hypertension   This is a chronic problem  The problem is controlled  Pertinent negatives include no chest pain or palpitations  Risk factors for coronary artery disease include dyslipidemia and post-menopausal state  Past treatments include calcium channel blockers and angiotensin blockers  Hypertensive end-organ damage includes a thyroid problem  none  Thyroid Problem   Presents for follow-up (Hypothyroid) visit  Patient reports no fatigue or palpitations  The symptoms have been stable         The following portions of the patient's history were reviewed and updated as appropriate: allergies, current medications, past family history, past medical history, past social history, past surgical history and problem list       Review of Systems   Constitutional: Negative for activity change, appetite change and fatigue  HENT: Negative  Respiratory: Negative  Negative for cough  Cardiovascular: Negative  Negative for chest pain and palpitations  Gastrointestinal: Negative  Genitourinary: Negative  Musculoskeletal: Positive for arthralgias  Neurological: Negative for dizziness and syncope  Psychiatric/Behavioral: Negative  Objective:  /70   Ht 5' (1 524 m)   Wt 45 7 kg (100 lb 12 8 oz)   BMI 19 69 kg/m²     Last thyroid labs were drawn in December and were in normal range  Physical Exam   Constitutional: She is oriented to person, place, and time  She appears well-developed and well-nourished  Pleasant 80year-old who appears younger than her stated age within normal BMI   Eyes: Pupils are equal, round, and reactive to light  Neck: Normal range of motion  Cardiovascular: Normal rate and intact distal pulses  Murmur heard  Pulmonary/Chest: Effort normal and breath sounds normal    Musculoskeletal: Normal range of motion  Steady ambulation with walker   Neurological: She is alert and oriented to person, place, and time  Psychiatric: She has a normal mood and affect   Her behavior is normal  Judgment and thought content normal

## 2018-04-17 PROBLEM — I48.20 CHRONIC ATRIAL FIBRILLATION (HCC): Status: ACTIVE | Noted: 2017-10-27

## 2018-05-07 DIAGNOSIS — I10 ESSENTIAL HYPERTENSION: Primary | ICD-10-CM

## 2018-05-07 RX ORDER — AMLODIPINE BESYLATE AND BENAZEPRIL HYDROCHLORIDE 5; 20 MG/1; MG/1
CAPSULE ORAL
Qty: 30 CAPSULE | Refills: 11 | Status: SHIPPED | OUTPATIENT
Start: 2018-05-07 | End: 2019-05-02 | Stop reason: SDUPTHER

## 2018-05-16 PROBLEM — I48.0 PAROXYSMAL ATRIAL FIBRILLATION (HCC): Status: ACTIVE | Noted: 2017-10-27

## 2018-05-17 ENCOUNTER — OFFICE VISIT (OUTPATIENT)
Dept: CARDIOLOGY CLINIC | Facility: CLINIC | Age: 83
End: 2018-05-17
Payer: MEDICARE

## 2018-05-17 VITALS
SYSTOLIC BLOOD PRESSURE: 136 MMHG | HEART RATE: 67 BPM | HEIGHT: 60 IN | DIASTOLIC BLOOD PRESSURE: 60 MMHG | BODY MASS INDEX: 20.03 KG/M2 | WEIGHT: 102 LBS

## 2018-05-17 DIAGNOSIS — I48.0 PAROXYSMAL ATRIAL FIBRILLATION (HCC): Primary | ICD-10-CM

## 2018-05-17 DIAGNOSIS — I10 ESSENTIAL HYPERTENSION: ICD-10-CM

## 2018-05-17 DIAGNOSIS — R00.1 JUNCTIONAL (NODAL) BRADYCARDIA: ICD-10-CM

## 2018-05-17 PROCEDURE — 99214 OFFICE O/P EST MOD 30 MIN: CPT | Performed by: INTERNAL MEDICINE

## 2018-05-17 PROCEDURE — 93000 ELECTROCARDIOGRAM COMPLETE: CPT | Performed by: INTERNAL MEDICINE

## 2018-05-17 NOTE — PROGRESS NOTES
Cardiology Follow Up    Eren Bell  3/28/1931  [id number]  [address] [place] [address]    Reason for visit:  4 month follow-up for paroxysmal atrial fibrillation, hypertension and sick sinus syndrome with junctional bradycardia  1  Paroxysmal atrial fibrillation (HCC)  POCT ECG   2  Essential hypertension     3  Junctional (jarett) bradycardia         Interval History:  Since the patient's last visit she has done well  She denies chest pain or shortness of breath  She does note perhaps some mild edema which is well controlled with support stockings  She has had no palpitations or lightheadedness  Patient Active Problem List   Diagnosis    Generalized weakness    Headache    Weakness    HLD (hyperlipidemia)    Hypothyroidism    Anxiety    Depression    Mixed hyperlipidemia    Hypertension    Paroxysmal atrial fibrillation (HCC)    Benign essential HTN    Junctional (jarett) bradycardia    Seasonal allergies    Sick sinus syndrome ([address] )    Spinal stenosis of lumbar region     Past Medical History:   Diagnosis Date    Anxiety     Depression     Fainting     Hyperlipidemia     Hypertension     Hyponatremia     Unclear etiology, possible SIADH    Left hand weakness 2006    Neuralgia    Lumbar stenosis     w/ LLE weakness, pain    MCI (mild cognitive impairment) 2008    Neuro cognitive testing revealed below standard memory performance      Pneumonia     Sick sinus syndrome (HCC)     Spinal stenosis     SSSS (staphylococcal scalded skin syndrome)      Social History     Social History    Marital status: /Civil Union     Spouse name: N/A    Number of children: 4    Years of education: high school graduate      Occupational History    hosewife or homemaker       Social History Main Topics    Smoking status: Former Smoker    Smokeless tobacco: Never Used    Alcohol use Yes    Drug use: No    Sexual activity: Not on file     Other Topics Concern    Not on file     Social History Narrative    Lives with spouse           Family History   Problem Relation Age of Onset    Atrial fibrillation Sister     Other Father      cardiac pacemaker     Pancreatic cancer Brother      No past surgical history on file  Current Outpatient Prescriptions:     amiodarone 100 mg tablet, , Disp: , Rfl:     amLODIPine-benazepril (LOTREL 5-20) 5-20 MG per capsule, 1 CAP BY MOUTH ONCE DAILY, Disp: 30 capsule, Rfl: 11    aspirin 81 MG tablet, Take 1 capsule by mouth daily, Disp: , Rfl:     levothyroxine 75 mcg tablet, TAKE 1 TABLET DAILY AS DIRECTED , Disp: 30 tablet, Rfl: 4    sertraline (ZOLOFT) 25 mg tablet, Take 25 mg by mouth daily, Disp: , Rfl:   Allergies   Allergen Reactions    Hydrochlorothiazide Rash    Losartan Palpitations    Penicillins Rash     Review of Systems:  Review of Systems   Constitutional: Negative for appetite change, fatigue, fever and unexpected weight change  Respiratory: Negative for cough, chest tightness, shortness of breath and wheezing  Cardiovascular: Positive for leg swelling  Negative for chest pain and palpitations  Gastrointestinal: Negative for abdominal distention, anal bleeding, blood in stool, constipation and diarrhea  Genitourinary: Positive for frequency and urgency  Negative for dysuria and hematuria  Musculoskeletal: Positive for back pain  Negative for arthralgias, gait problem and joint swelling  Neurological: Negative for syncope, speech difficulty, light-headedness and headaches  Psychiatric/Behavioral: Negative for agitation, behavioral problems, confusion and decreased concentration  Physical Exam:  Vitals:    05/17/18 1007   BP: 136/60   Pulse: 67   Weight: 46 3 kg (102 lb)   Height: 5' (1 524 m)       Physical Exam   Constitutional: She is oriented to person, place, and time  She appears well-developed   No distress  thin   HENT:   Head: Normocephalic and atraumatic  Mouth/Throat: No oropharyngeal exudate  Eyes: Conjunctivae are normal  No scleral icterus  Neck: Neck supple  Normal carotid pulses and no JVD present  Carotid bruit is not present  No thyromegaly present  Cardiovascular: Normal rate and regular rhythm  Exam reveals no gallop and no friction rub  Murmur heard  Crescendo decrescendo systolic (basal) murmur is present with a grade of 1/6    No diastolic murmur is present   Pulses:       Dorsalis pedis pulses are 1+ on the right side, and 1+ on the left side  Posterior tibial pulses are 1+ on the right side, and 1+ on the left side  Pulmonary/Chest: Breath sounds normal  She has no wheezes  She has no rhonchi  She has no rales  Abdominal: Soft  She exhibits no mass  There is no hepatosplenomegaly  There is no tenderness  Musculoskeletal: She exhibits edema (trace to +1 nando in LEs) and deformity (kyphosis)  She exhibits no tenderness  Neurological: She is alert and oriented to person, place, and time  She has normal strength  No cranial nerve deficit or sensory deficit  Skin: Skin is warm and dry  No rash noted  No erythema  No pallor  Psychiatric: She has a normal mood and affect  Her behavior is normal  Judgment and thought content normal           Discussion/Summary:  1  PAF  Maintaining sinus rhythm on amiodarone 100 mg daily  Having no active palpitations  Patient was not anxious to go on blood thinners    On ASA only  2  Hypertension  Well controlled on amlodipine/benazepril  Continue same  Mild edema on this but this appears to be controlled with support stockings  3  Sick sinus syndrome with junctional bradycardia during last visit  This is now gone since we stop metoprolol  Heart rate adequate on amiodarone  TSH and liver function test ordered for later in the year by PCP      Follow-up 6 months    Will order x-ray after that visit in light of amiodarone therapy    Elaine Ruby MD

## 2018-05-23 DIAGNOSIS — I48.0 PAF (PAROXYSMAL ATRIAL FIBRILLATION) (HCC): Primary | ICD-10-CM

## 2018-05-24 RX ORDER — AMIODARONE HYDROCHLORIDE 100 MG/1
TABLET ORAL
Qty: 30 TABLET | Refills: 5 | Status: SHIPPED | OUTPATIENT
Start: 2018-05-24 | End: 2018-11-19 | Stop reason: SDUPTHER

## 2018-06-20 DIAGNOSIS — F32.A DEPRESSION, UNSPECIFIED DEPRESSION TYPE: ICD-10-CM

## 2018-06-20 DIAGNOSIS — F41.9 ANXIETY: Primary | ICD-10-CM

## 2018-06-20 RX ORDER — SERTRALINE HYDROCHLORIDE 25 MG/1
TABLET, FILM COATED ORAL
Qty: 30 TABLET | Refills: 5 | Status: SHIPPED | OUTPATIENT
Start: 2018-06-20 | End: 2019-06-05 | Stop reason: SDUPTHER

## 2018-08-27 DIAGNOSIS — E03.9 ACQUIRED HYPOTHYROIDISM: ICD-10-CM

## 2018-08-28 RX ORDER — LEVOTHYROXINE SODIUM 0.07 MG/1
TABLET ORAL
Qty: 30 TABLET | Refills: 11 | Status: SHIPPED | OUTPATIENT
Start: 2018-08-28 | End: 2019-08-27 | Stop reason: SDUPTHER

## 2018-10-16 ENCOUNTER — OFFICE VISIT (OUTPATIENT)
Dept: FAMILY MEDICINE CLINIC | Facility: CLINIC | Age: 83
End: 2018-10-16
Payer: MEDICARE

## 2018-10-16 VITALS
WEIGHT: 101 LBS | DIASTOLIC BLOOD PRESSURE: 60 MMHG | HEIGHT: 60 IN | BODY MASS INDEX: 19.83 KG/M2 | SYSTOLIC BLOOD PRESSURE: 142 MMHG

## 2018-10-16 DIAGNOSIS — I48.0 PAROXYSMAL ATRIAL FIBRILLATION (HCC): ICD-10-CM

## 2018-10-16 DIAGNOSIS — I10 BENIGN ESSENTIAL HTN: Primary | ICD-10-CM

## 2018-10-16 DIAGNOSIS — E03.9 ACQUIRED HYPOTHYROIDISM: ICD-10-CM

## 2018-10-16 PROCEDURE — 99214 OFFICE O/P EST MOD 30 MIN: CPT | Performed by: FAMILY MEDICINE

## 2018-10-16 NOTE — PROGRESS NOTES
Assessment/Plan:     Diagnoses and all orders for this visit:    Benign essential HTN  Comments:  at goal    Acquired hypothyroidism  Comments:  Within range, will continue same management     Paroxysmal atrial fibrillation (HCC)      CMP and thyroid were reviewed and in normal limits  Declines flu shot any immunizations  Patient BP at goal, will continue same management  Patient scheduled to see Cardiologist 11/27/2018  Patient denies any palpitations or light headedness  Rate controlled today with some irregularity  Patient with continue amlodipine  Patient thyroid level within rang: TSH-1 03, will continue current regimen  Patient has been doing 12 5mg of Zoloft daily, script adjusted  Will have patient follow-up in 6 months  Form completed for [facility] and sent with patient  +25 minutes spent with patient, with >50% counseling  I attest that the nurse practitioner student's  Documentation has been prepared under my direction and personally reviewed by me  I confirm that the note above accurately reflects the work and medical decision making performed by me  Joe OVALLE  Subjective:   Chief Complaint   Patient presents with    Follow-up     6 months       Patient ID: Zonia Mena is a 80 y o  female  Patient is a pleasant 79yo female, presenting here for follow-up for chronic condition  Patient appears well and younger than her stated age  Patient is compliant with her medications and treatment plan prescribed  Patient lives in apartments at [facility], in independently living with her  Wayne Tamayo  Patient walks for exercise and uses a walker for aprox 30 minutes  Patient continues to stretch at home with active ROM exercise  Hypertension   This is a chronic problem  The current episode started more than 1 year ago  The problem has been gradually improving since onset  The problem is controlled   Pertinent negatives include no chest pain, headaches, malaise/fatigue, palpitations, peripheral edema or shortness of breath  Past treatments include ACE inhibitors and diuretics (LOTREL)  The current treatment provides moderate improvement  There are no compliance problems  There is no history of angina or CAD/MI  Identifiable causes of hypertension include a thyroid problem  Thyroid Problem   Presents for follow-up visit  Symptoms include constipation  Patient reports no palpitations  The symptoms have been stable  Heart Problem   This is a chronic (Paroxysmal atrial fibrillation ) problem  The current episode started today  The problem occurs constantly  The problem has been unchanged  Pertinent negatives include no arthralgias, chest pain, congestion, headaches, joint swelling or myalgias  Treatments tried: On Amlodipine daily  The treatment provided significant relief  The following portions of the patient's history were reviewed and updated as appropriate: allergies, current medications, past family history, past medical history, past social history, past surgical history and problem list       Review of Systems   Constitutional: Negative for activity change, appetite change, malaise/fatigue and unexpected weight change  Patient walks 30 minutes/day    HENT: Negative for congestion, postnasal drip and rhinorrhea  Eyes: Negative for visual disturbance  Eye exam performed September 2018   Respiratory: Negative for shortness of breath  Cardiovascular: Negative for chest pain and palpitations  Gastrointestinal: Positive for constipation  Patient takes natural remedies including smooth move  Patient has normal BM with these remedies and stays on consistent schedule  Genitourinary: Negative for dysuria  Musculoskeletal: Negative for arthralgias, joint swelling and myalgias  Skin: Negative for pallor  Neurological: Negative for headaches  Psychiatric/Behavioral: Negative for confusion and decreased concentration  Objective:  /60   Ht 5' (1 524 m)   Wt 45 8 kg (101 lb)   BMI 19 73 kg/m²        Physical Exam   Constitutional: She is oriented to person, place, and time  She appears well-developed and well-nourished  HENT:   Head: Normocephalic  Eyes: Pupils are equal, round, and reactive to light  Cardiovascular: Normal heart sounds and intact distal pulses  No murmur heard  Normal rate 70s, irregular rhythmn   Pulmonary/Chest: Effort normal and breath sounds normal  No respiratory distress  She has no wheezes  Abdominal: Soft  There is no tenderness  Musculoskeletal: Normal range of motion  Neurological: She is alert and oriented to person, place, and time  Skin: Skin is warm  Psychiatric: She has a normal mood and affect   Her behavior is normal  Judgment and thought content normal

## 2018-10-29 ENCOUNTER — TELEPHONE (OUTPATIENT)
Dept: FAMILY MEDICINE CLINIC | Facility: CLINIC | Age: 83
End: 2018-10-29

## 2018-10-29 NOTE — TELEPHONE ENCOUNTER
Blood pressure in the office just the other week was 142/60  Maybe just a fluke  Why "flushed"? No fever I would assume  If there is no other complaints then just simply monitor    Call if consistently elevator any other acute symptoms

## 2018-10-29 NOTE — TELEPHONE ENCOUNTER
Josselin Goncalves currently not in the office, will need to call tomorrow  Will try to speak to the patient

## 2018-10-29 NOTE — TELEPHONE ENCOUNTER
Spoke to the patient, she denies any other symptoms including fever  I will as stated in prior message speak to [place] at [facility] tomorrow

## 2018-10-29 NOTE — TELEPHONE ENCOUNTER
BP's elevated yesterday and today, 170/91 this morning, 182/95 this afternoon  Feels flushed  No other patient complaints  Patient does monitor her BP regularly and has been fairly stable

## 2018-10-30 NOTE — TELEPHONE ENCOUNTER
I spoke to Gabo Goodman at [facility], she is going to help patient monitor her BP and will call us with any acute symptoms or continued elevation in symptoms

## 2018-11-19 DIAGNOSIS — I48.0 PAF (PAROXYSMAL ATRIAL FIBRILLATION) (HCC): ICD-10-CM

## 2018-11-20 RX ORDER — AMIODARONE HYDROCHLORIDE 100 MG/1
TABLET ORAL
Qty: 30 TABLET | Refills: 11 | Status: SHIPPED | OUTPATIENT
Start: 2018-11-20 | End: 2019-10-22 | Stop reason: SDUPTHER

## 2018-11-21 DIAGNOSIS — I48.0 PAF (PAROXYSMAL ATRIAL FIBRILLATION) (HCC): ICD-10-CM

## 2018-11-22 RX ORDER — AMIODARONE HYDROCHLORIDE 100 MG/1
TABLET ORAL
Qty: 30 TABLET | Refills: 3 | Status: SHIPPED | OUTPATIENT
Start: 2018-11-22 | End: 2018-11-27 | Stop reason: SDUPTHER

## 2018-11-27 ENCOUNTER — OFFICE VISIT (OUTPATIENT)
Dept: CARDIOLOGY CLINIC | Facility: CLINIC | Age: 83
End: 2018-11-27
Payer: MEDICARE

## 2018-11-27 VITALS
SYSTOLIC BLOOD PRESSURE: 152 MMHG | BODY MASS INDEX: 19.94 KG/M2 | HEIGHT: 60 IN | DIASTOLIC BLOOD PRESSURE: 68 MMHG | WEIGHT: 101.6 LBS | HEART RATE: 70 BPM

## 2018-11-27 DIAGNOSIS — I10 BENIGN ESSENTIAL HTN: ICD-10-CM

## 2018-11-27 DIAGNOSIS — I49.5 SICK SINUS SYNDROME (HCC): ICD-10-CM

## 2018-11-27 DIAGNOSIS — I48.0 PAROXYSMAL ATRIAL FIBRILLATION (HCC): Primary | ICD-10-CM

## 2018-11-27 PROCEDURE — 93000 ELECTROCARDIOGRAM COMPLETE: CPT | Performed by: INTERNAL MEDICINE

## 2018-11-27 PROCEDURE — 99213 OFFICE O/P EST LOW 20 MIN: CPT | Performed by: INTERNAL MEDICINE

## 2018-11-27 NOTE — PROGRESS NOTES
Cardiology Follow Up    Gareth Berger  3/28/1931  [id number]  [address] [facility] [place] [address]  [phone]    Reason for visit: 6 month FOR for PAF, HTN and SSS with jx bradycardia    1  Paroxysmal atrial fibrillation (HCC)  POCT ECG   2  Benign essential HTN  POCT ECG   3  Sick sinus syndrome (HCC)         Interval History:  Since the patient's last visit, she did have 2 hr of palpitations  This occurred in October  Her heart rate was up to 120  She took an extra half of amiodarone  The symptoms subsided and have not recurred  Otherwise she has done well  She denies chest pain, shortness of breath or lightheadedness  She does not have much edema wear support stockings      Patient Active Problem List   Diagnosis    Generalized weakness    Headache    Weakness    HLD (hyperlipidemia)    Hypothyroidism    Anxiety    Depression    Mixed hyperlipidemia    Paroxysmal atrial fibrillation (HCC)    Benign essential HTN    Junctional (jarett) bradycardia    Seasonal allergies    Sick sinus syndrome ([address] )    Spinal stenosis of lumbar region     Past Medical History:   Diagnosis Date    Anxiety     Depression     Fainting     Hyperlipidemia     Hypertension     Hyponatremia     Unclear etiology, possible SIADH    Left hand weakness 2006    Neuralgia    Lumbar stenosis     w/ LLE weakness, pain    MCI (mild cognitive impairment) 2008    Neuro cognitive testing revealed below standard memory performance      Pneumonia     Sick sinus syndrome (HCC)     Spinal stenosis     SSSS (staphylococcal scalded skin syndrome)      Social History     Social History    Marital status: /Civil Union     Spouse name: N/A    Number of children: 4    Years of education: high school graduate      Occupational History    hosewife or homemaker       Social History Main Topics    Smoking status: Former Smoker    Smokeless tobacco: Never Used    Alcohol use Yes    Drug use: No    Sexual activity: Not on file     Other Topics Concern    Not on file     Social History Narrative    Lives with spouse           Family History   Problem Relation Age of Onset    Atrial fibrillation Sister     Other Father         cardiac pacemaker     Pancreatic cancer Brother      No past surgical history on file  Current Outpatient Prescriptions:     amiodarone 100 mg tablet, TAKE 1 TABLET DAILY  , Disp: 30 tablet, Rfl: 11    amLODIPine-benazepril (LOTREL 5-20) 5-20 MG per capsule, 1 CAP BY MOUTH ONCE DAILY, Disp: 30 capsule, Rfl: 11    aspirin 81 MG tablet, Take 1 capsule by mouth daily, Disp: , Rfl:     levothyroxine 75 mcg tablet, TAKE 1 TABLET DAILY AS DIRECTED , Disp: 30 tablet, Rfl: 11    sertraline (ZOLOFT) 25 mg tablet, 1 TAB BY MOUTH ONCE DAILY WITH BREAKFAST, Disp: 30 tablet, Rfl: 5  Allergies   Allergen Reactions    Hydrochlorothiazide Rash    Losartan Palpitations    Penicillins Rash       Review of Systems:  Review of Systems   Constitutional: Negative for appetite change, fatigue, fever and unexpected weight change  Respiratory: Negative for cough, chest tightness, shortness of breath and wheezing  Cardiovascular: Positive for palpitations and leg swelling (minor)  Negative for chest pain  Gastrointestinal: Positive for constipation  Negative for abdominal distention, anal bleeding, blood in stool and diarrhea  Genitourinary: Positive for frequency and urgency  Negative for dysuria and hematuria  Musculoskeletal: Positive for back pain  Negative for arthralgias, gait problem and joint swelling  Skin:        Hair loss   Neurological: Negative for dizziness, syncope, speech difficulty, light-headedness and headaches  Psychiatric/Behavioral: Negative for agitation, behavioral problems, confusion and decreased concentration         Physical Exam:  Vitals:    11/27/18 1004   BP: 152/68 BP Location: Right arm   Patient Position: Sitting   Cuff Size: Adult   Pulse: 70   Weight: 46 1 kg (101 lb 9 6 oz)   Height: 5' (1 524 m)         Physical Exam   Constitutional: She is oriented to person, place, and time  She appears well-developed  No distress  thin   HENT:   Head: Normocephalic and atraumatic  Mouth/Throat: No oropharyngeal exudate  Eyes: Conjunctivae are normal  No scleral icterus  Neck: Neck supple  Normal carotid pulses and no JVD present  Carotid bruit is not present  No thyromegaly present  Cardiovascular: Normal rate and regular rhythm  Exam reveals no gallop and no friction rub  No murmur heard  No systolic murmur is present    No diastolic murmur is present   Pulses:       Dorsalis pedis pulses are 1+ on the right side, and 1+ on the left side  Posterior tibial pulses are 2+ on the right side, and 2+ on the left side  Pulmonary/Chest: Breath sounds normal  She has no wheezes  She has no rhonchi  She has no rales  Abdominal: Soft  She exhibits no mass  There is no hepatosplenomegaly  There is no tenderness  Musculoskeletal: She exhibits deformity (kyphosis)  She exhibits no edema or tenderness  Neurological: She is alert and oriented to person, place, and time  She has normal strength  No cranial nerve deficit or sensory deficit  Skin: Skin is warm and dry  No rash noted  No erythema  No pallor  Psychiatric: She has a normal mood and affect  Her behavior is normal  Judgment and thought content normal        Discussion/Summary:  1  PAF  Likely headed episode lasted a few hours in October  For the most part maintaining sinus rhythm on low-dose amiodarone  Liver function test and thyroid function tests have been normal   Continue amiodarone suppression  Patient does have an elevated chads Vasc 2 score of 4  I would be inclined to put her on low-dose Eliquis if she continued to have these episodes  Will watch going forward  Continue aspirin for now  Patient has strong preference for not being on [facility] as well  2  Benign essential hypertension  Blood pressure somewhat high on the systolic end today  Usually well controlled  Continue calcium channel blocker, Ace inhibitor combination  3  Sick sinus syndrome with evidence for junctional bradycardia in the past   Not evident now        FU 6 months    Francisco J Trinidad MD

## 2019-04-23 ENCOUNTER — OFFICE VISIT (OUTPATIENT)
Dept: FAMILY MEDICINE CLINIC | Facility: CLINIC | Age: 84
End: 2019-04-23
Payer: COMMERCIAL

## 2019-04-23 VITALS
BODY MASS INDEX: 19.71 KG/M2 | HEIGHT: 60 IN | WEIGHT: 100.4 LBS | SYSTOLIC BLOOD PRESSURE: 122 MMHG | DIASTOLIC BLOOD PRESSURE: 69 MMHG

## 2019-04-23 DIAGNOSIS — F32.A DEPRESSION, UNSPECIFIED DEPRESSION TYPE: ICD-10-CM

## 2019-04-23 DIAGNOSIS — I10 BENIGN ESSENTIAL HTN: Primary | ICD-10-CM

## 2019-04-23 DIAGNOSIS — I48.0 PAROXYSMAL ATRIAL FIBRILLATION (HCC): ICD-10-CM

## 2019-04-23 DIAGNOSIS — F41.9 ANXIETY: ICD-10-CM

## 2019-04-23 DIAGNOSIS — E03.9 ACQUIRED HYPOTHYROIDISM: ICD-10-CM

## 2019-04-23 PROCEDURE — 1101F PT FALLS ASSESS-DOCD LE1/YR: CPT | Performed by: FAMILY MEDICINE

## 2019-04-23 PROCEDURE — 99213 OFFICE O/P EST LOW 20 MIN: CPT | Performed by: FAMILY MEDICINE

## 2019-05-02 DIAGNOSIS — I10 ESSENTIAL HYPERTENSION: ICD-10-CM

## 2019-05-02 RX ORDER — AMLODIPINE BESYLATE AND BENAZEPRIL HYDROCHLORIDE 5; 20 MG/1; MG/1
CAPSULE ORAL
Qty: 30 CAPSULE | Refills: 11 | Status: SHIPPED | OUTPATIENT
Start: 2019-05-02 | End: 2020-04-27

## 2019-05-29 ENCOUNTER — TELEPHONE (OUTPATIENT)
Dept: CARDIOLOGY CLINIC | Facility: CLINIC | Age: 84
End: 2019-05-29

## 2019-06-04 ENCOUNTER — OFFICE VISIT (OUTPATIENT)
Dept: CARDIOLOGY CLINIC | Facility: CLINIC | Age: 84
End: 2019-06-04
Payer: COMMERCIAL

## 2019-06-04 VITALS
WEIGHT: 100.6 LBS | DIASTOLIC BLOOD PRESSURE: 62 MMHG | SYSTOLIC BLOOD PRESSURE: 120 MMHG | HEIGHT: 60 IN | BODY MASS INDEX: 19.75 KG/M2 | HEART RATE: 65 BPM | OXYGEN SATURATION: 95 %

## 2019-06-04 DIAGNOSIS — I10 BENIGN ESSENTIAL HTN: ICD-10-CM

## 2019-06-04 DIAGNOSIS — I49.5 SICK SINUS SYNDROME (HCC): ICD-10-CM

## 2019-06-04 DIAGNOSIS — I48.0 PAROXYSMAL ATRIAL FIBRILLATION (HCC): Primary | ICD-10-CM

## 2019-06-04 PROCEDURE — 99214 OFFICE O/P EST MOD 30 MIN: CPT | Performed by: INTERNAL MEDICINE

## 2019-06-04 PROCEDURE — 93000 ELECTROCARDIOGRAM COMPLETE: CPT | Performed by: INTERNAL MEDICINE

## 2019-06-05 DIAGNOSIS — F32.A DEPRESSION, UNSPECIFIED DEPRESSION TYPE: ICD-10-CM

## 2019-06-05 DIAGNOSIS — F41.9 ANXIETY: ICD-10-CM

## 2019-06-05 RX ORDER — SERTRALINE HYDROCHLORIDE 25 MG/1
TABLET, FILM COATED ORAL
Qty: 30 TABLET | Refills: 11 | Status: SHIPPED | OUTPATIENT
Start: 2019-06-05 | End: 2021-05-12

## 2019-06-12 ENCOUNTER — TELEPHONE (OUTPATIENT)
Dept: CARDIOLOGY CLINIC | Facility: CLINIC | Age: 84
End: 2019-06-12

## 2019-08-27 DIAGNOSIS — E03.9 ACQUIRED HYPOTHYROIDISM: ICD-10-CM

## 2019-08-27 RX ORDER — LEVOTHYROXINE SODIUM 0.07 MG/1
TABLET ORAL
Qty: 30 TABLET | Refills: 4 | Status: SHIPPED | OUTPATIENT
Start: 2019-08-27 | End: 2019-09-09 | Stop reason: SDUPTHER

## 2019-09-03 ENCOUNTER — TELEPHONE (OUTPATIENT)
Dept: FAMILY MEDICINE CLINIC | Facility: CLINIC | Age: 84
End: 2019-09-03

## 2019-09-03 DIAGNOSIS — I10 ESSENTIAL HYPERTENSION: Primary | ICD-10-CM

## 2019-09-03 DIAGNOSIS — I48.0 PAROXYSMAL ATRIAL FIBRILLATION (HCC): ICD-10-CM

## 2019-09-03 DIAGNOSIS — M25.473 ANKLE SWELLING, UNSPECIFIED LATERALITY: ICD-10-CM

## 2019-09-03 DIAGNOSIS — E03.9 ACQUIRED HYPOTHYROIDISM: ICD-10-CM

## 2019-09-03 NOTE — TELEPHONE ENCOUNTER
Have been on Eliquis since June 6, ankles have been swelling for the past 2-3 months, on Amiodarone as well  States very annoying because they are very swollen, doesn't go down over night, she does keep her feet up for most of the day  Also she is always hot  Asking if these symptoms are side effects to any of her medications

## 2019-09-04 NOTE — TELEPHONE ENCOUNTER
I spoke to patient, she is aware and agreeable, left voicemail message for Oren Ashraf at [facility] to return my call

## 2019-09-04 NOTE — TELEPHONE ENCOUNTER
I have a couple of comments  Amiodarone can definitely does the thyroid off and this can cause some swelling  We probably should update TSH, free T4 and free T3  We can contact Nicolas Avalos at [facility] (her name is probably summer on paperwork had should be in media), she would do range for blood work draw  My 2nd comment is had patient is on amlodipine-benazepril combination she has been on this for while also has been on the amiodarone for while  Amlodipine can exacerbate venous insufficiency and cause increased swelling  Eliquis is not commonly known to cause ankle swelling  So definitely do the thyroid blood work as noted above along with BMP to check kidney

## 2019-09-09 ENCOUNTER — TELEPHONE (OUTPATIENT)
Dept: FAMILY MEDICINE CLINIC | Facility: CLINIC | Age: 84
End: 2019-09-09

## 2019-09-09 DIAGNOSIS — E03.9 ACQUIRED HYPOTHYROIDISM: ICD-10-CM

## 2019-09-09 RX ORDER — LEVOTHYROXINE SODIUM 0.07 MG/1
TABLET ORAL
Qty: 30 TABLET | Refills: 4
Start: 2019-09-09 | End: 2020-02-10

## 2019-09-09 NOTE — TELEPHONE ENCOUNTER
----- Message from Bethel Brown DO sent at 3/3/2926  3:12 PM EDT -----  Regarding: labs  TSH is indicating slightly over therapeutic dosing of levothyroxine  Confirm that patient is taking the 75 mcg every day    We may want to change that to skip 1 day weekly

## 2019-10-22 ENCOUNTER — OFFICE VISIT (OUTPATIENT)
Dept: FAMILY MEDICINE CLINIC | Facility: CLINIC | Age: 84
End: 2019-10-22
Payer: COMMERCIAL

## 2019-10-22 VITALS
HEIGHT: 59 IN | SYSTOLIC BLOOD PRESSURE: 142 MMHG | DIASTOLIC BLOOD PRESSURE: 62 MMHG | TEMPERATURE: 97.3 F | RESPIRATION RATE: 16 BRPM | WEIGHT: 104.6 LBS | HEART RATE: 60 BPM | BODY MASS INDEX: 21.09 KG/M2

## 2019-10-22 DIAGNOSIS — R79.89 HIGH SERUM HIGH DENSITY LIPOPROTEIN (HDL): ICD-10-CM

## 2019-10-22 DIAGNOSIS — E03.9 ACQUIRED HYPOTHYROIDISM: ICD-10-CM

## 2019-10-22 DIAGNOSIS — I10 BENIGN ESSENTIAL HTN: Primary | ICD-10-CM

## 2019-10-22 DIAGNOSIS — I48.0 PAF (PAROXYSMAL ATRIAL FIBRILLATION) (HCC): ICD-10-CM

## 2019-10-22 PROBLEM — R53.1 GENERALIZED WEAKNESS: Status: RESOLVED | Noted: 2017-10-14 | Resolved: 2019-10-22

## 2019-10-22 PROBLEM — R53.1 WEAKNESS: Status: RESOLVED | Noted: 2017-10-14 | Resolved: 2019-10-22

## 2019-10-22 PROBLEM — R51.9 HEADACHE: Status: RESOLVED | Noted: 2017-10-14 | Resolved: 2019-10-22

## 2019-10-22 PROCEDURE — 99213 OFFICE O/P EST LOW 20 MIN: CPT | Performed by: FAMILY MEDICINE

## 2019-10-22 RX ORDER — AMIODARONE HYDROCHLORIDE 100 MG/1
100 TABLET ORAL DAILY
Qty: 30 TABLET | Refills: 11 | Status: SHIPPED | OUTPATIENT
Start: 2019-10-22 | End: 2020-03-10

## 2019-10-22 NOTE — PROGRESS NOTES
Assessment/Plan:     Diagnoses and all orders for this visit:    Benign essential HTN    PAF (paroxysmal atrial fibrillation) (HCC)  -     amiodarone 100 mg tablet; Take 1 tablet (100 mg total) by mouth daily    Acquired hypothyroidism  -     T3, free; Future  -     T4, free; Future  -     TSH, 3rd generation; Future    High serum high density lipoprotein (HDL)        Patient doing excellently for 80year-old  Continue same medical regimen as well as supplements  Follow-up here in 6 months  Blood work in late December to recheck thyroid  Patient declines flu shot  Subjective:   Chief Complaint   Patient presents with    Follow-up     6 month follow up  pt also has c/o ankle swelling  Patient ID: Tobias Coe is a 80 y o  female  Patient is a pleasant 59-year-old female with history of hypothyroidism paroxysmal atrial fibrillation  Here for regular checkup  Recent labs with stable numbers  Patient with elevated cholesterol but has a HDL of 107  Thyroid numbers were slightly in the hyper range and med was adjusted to hold on Wednesdays  She will need recheck  She has been intolerant to statins  She currently is tolerating her present regimen  She has had some days where her weight has fluctuated upper down but to no extremes  She has a touch of ankle swelling right greater than left this is likely from amlodipine  She does avoid salt  She will be seeing Dr Eunice Parker in December  Declines flu shot  The following portions of the patient's history were reviewed and updated as appropriate: allergies, current medications, past family history, past medical history, past social history, past surgical history and problem list     Review of Systems   Constitutional: Negative for fatigue and fever  HENT: Dental problem:  has dentures  Eyes: Visual disturbance: Up-to-date on eye exam    Respiratory: Negative for cough and shortness of breath  Cardiovascular: Negative for chest pain   Leg swelling:  slight  Gastrointestinal: Negative  Genitourinary: Negative  Musculoskeletal: Negative  Ambulation steady with walker   Neurological: Negative  Psychiatric/Behavioral: Negative  Objective:      /62   Pulse 60   Temp (!) 97 3 °F (36 3 °C) (Temporal)   Resp 16   Ht 4' 10 66" (1 49 m)   Wt 47 4 kg (104 lb 9 6 oz)   BMI 21 37 kg/m²          Physical Exam   Constitutional: She is oriented to person, place, and time  She appears well-developed and well-nourished  Pleasant petite 40-year-old female who appears younger than her stated age within normal BMI   HENT:   Mouth/Throat: Oropharynx is clear and moist    Eyes: Pupils are equal, round, and reactive to light  Neck: Normal range of motion  No thyromegaly present  Cardiovascular: Normal rate and normal heart sounds  Pulmonary/Chest: Effort normal and breath sounds normal    Abdominal: Soft  Bowel sounds are normal    Musculoskeletal: Normal range of motion  Neurological: She is oriented to person, place, and time  Psychiatric: She has a normal mood and affect  Her behavior is normal  Judgment and thought content normal    Vitals reviewed

## 2019-12-17 ENCOUNTER — OFFICE VISIT (OUTPATIENT)
Dept: CARDIOLOGY CLINIC | Facility: CLINIC | Age: 84
End: 2019-12-17
Payer: COMMERCIAL

## 2019-12-17 VITALS
WEIGHT: 102 LBS | HEART RATE: 60 BPM | BODY MASS INDEX: 21.41 KG/M2 | HEIGHT: 58 IN | SYSTOLIC BLOOD PRESSURE: 140 MMHG | DIASTOLIC BLOOD PRESSURE: 58 MMHG

## 2019-12-17 DIAGNOSIS — I48.0 PAROXYSMAL ATRIAL FIBRILLATION (HCC): Primary | ICD-10-CM

## 2019-12-17 DIAGNOSIS — I10 BENIGN ESSENTIAL HTN: ICD-10-CM

## 2019-12-17 DIAGNOSIS — I49.5 SICK SINUS SYNDROME (HCC): ICD-10-CM

## 2019-12-17 PROCEDURE — 99214 OFFICE O/P EST MOD 30 MIN: CPT | Performed by: INTERNAL MEDICINE

## 2019-12-17 PROCEDURE — 93000 ELECTROCARDIOGRAM COMPLETE: CPT | Performed by: INTERNAL MEDICINE

## 2019-12-17 NOTE — PROGRESS NOTES
Cardiology Follow Up    Edward Kahn  3/28/1931  [id number]  [address] [place] [id number] [phone]    Reason for visit: 6 month for  PAF (amiodarone), HTN and SSS with jx bradycardia      1  Paroxysmal atrial fibrillation (HCC)  POCT ECG   2  Benign essential HTN     3  Sick sinus syndrome (HCC)         Interval History:   Since the patient's last visit, she denies palpitations  She did have some left inframammary chest pain about 2 weeks ago which was on and off for few days and has not recurred  She does note some mild ankle edema  She denies lightheadedness  She denies dyspnea    Patient Active Problem List   Diagnosis    Hypothyroidism    Anxiety    Depression    Mixed hyperlipidemia    Paroxysmal atrial fibrillation (HCC)    Benign essential HTN    Junctional (jarett) bradycardia    Seasonal allergies    Sick sinus syndrome ([address] )    Spinal stenosis of lumbar region    High serum high density lipoprotein (HDL)     Past Medical History:   Diagnosis Date    Anxiety     Depression     Fainting     Hyperlipidemia     Hypertension     Hyponatremia     Unclear etiology, possible SIADH    Left hand weakness 2006    Neuralgia    Lumbar stenosis     w/ LLE weakness, pain    MCI (mild cognitive impairment) 2008    Neuro cognitive testing revealed below standard memory performance      Sick sinus syndrome ([address] )     Spinal stenosis      Social History     Socioeconomic History    Marital status: /Civil Union     Spouse name: Not on file    Number of children: 3    Years of education: high school graduate     Highest education level: Not on file   Occupational History    Occupation: hosewife or homemaker    Social Needs    Financial resource strain: Not on file    Food insecurity:     Worry: Not on file     Inability: Not on file    Transportation needs:     Medical: Not on file     Non-medical: Not on file   Tobacco Use    Smoking status: Former Smoker    Smokeless tobacco: Never Used   Substance and Sexual Activity    Alcohol use: Yes    Drug use: No    Sexual activity: Not on file   Lifestyle    Physical activity:     Days per week: Not on file     Minutes per session: Not on file    Stress: Not on file   Relationships    Social connections:     Talks on phone: Not on file     Gets together: Not on file     Attends Taoism service: Not on file     Active member of club or organization: Not on file     Attends meetings of clubs or organizations: Not on file     Relationship status: Not on file    Intimate partner violence:     Fear of current or ex partner: Not on file     Emotionally abused: Not on file     Physically abused: Not on file     Forced sexual activity: Not on file   Other Topics Concern    Not on file   Social History Narrative    Lives with spouse       Family History   Problem Relation Age of Onset    Atrial fibrillation Sister     Other Father         cardiac pacemaker     Pancreatic cancer Brother      No past surgical history on file  Current Outpatient Medications:     amiodarone 100 mg tablet, Take 1 tablet (100 mg total) by mouth daily, Disp: 30 tablet, Rfl: 11    amLODIPine-benazepril (LOTREL 5-20) 5-20 MG per capsule, 1 CAP BY MOUTH ONCE DAILY, Disp: 30 capsule, Rfl: 11    apixaban (ELIQUIS) 2 5 mg, Take 1 tablet (2 5 mg total) by mouth 2 (two) times a day, Disp: 60 tablet, Rfl: 11    levothyroxine 75 mcg tablet, Take 1 tablet daily 6 days/week, skip Wednesday  , Disp: 30 tablet, Rfl: 4    sertraline (ZOLOFT) 25 mg tablet, 1 TAB BY MOUTH ONCE DAILY WITH BREAKFAST, Disp: 30 tablet, Rfl: 11  Allergies   Allergen Reactions    Hydrochlorothiazide Rash    Losartan Palpitations    Penicillins Rash     Review of Systems:  Review of Systems   Constitutional: Negative for activity change, appetite change, fatigue and unexpected weight change  Respiratory: Negative for cough, chest tightness, shortness of breath and wheezing  Cardiovascular: Positive for chest pain (2 weeks ago    limited)  Negative for palpitations and leg swelling  Gastrointestinal: Negative for abdominal pain, blood in stool, constipation and diarrhea  Genitourinary: Positive for frequency  Negative for dysuria, hematuria and urgency  Musculoskeletal: Negative for arthralgias, back pain, gait problem and joint swelling  Neurological: Negative for dizziness, syncope, speech difficulty, light-headedness, numbness and headaches  Psychiatric/Behavioral: Negative for agitation, behavioral problems, confusion and decreased concentration  Physical Exam:  Vitals:    12/17/19 1002   BP: 140/58   Pulse: 60   Weight: 46 3 kg (102 lb)   Height: 4' 10" (1 473 m)       Physical Exam   Constitutional: She is oriented to person, place, and time  Thin elderly female in NAD   HENT:   Head: Normocephalic and atraumatic  Mouth/Throat: Oropharynx is clear and moist  No oropharyngeal exudate  Eyes: Conjunctivae are normal  No scleral icterus  Neck: Neck supple  Normal carotid pulses and no JVD present  Carotid bruit is not present  No thyromegaly present  Cardiovascular: Normal rate, regular rhythm and normal heart sounds  Occasional extrasystoles are present  Exam reveals no gallop and no friction rub  No murmur heard  Pulses:       Dorsalis pedis pulses are 1+ on the right side, and 1+ on the left side  Posterior tibial pulses are 1+ on the right side, and 1+ on the left side  Pulmonary/Chest: Breath sounds normal  She has no wheezes  She has no rhonchi  She has no rales  Abdominal: Soft  She exhibits no mass  There is no hepatosplenomegaly  There is no tenderness  Musculoskeletal: She exhibits edema (trace to +1 post ankle edema)  She exhibits no tenderness or deformity  Neurological: She is alert and oriented to person, place, and time   She has normal strength  No cranial nerve deficit or sensory deficit  Skin: Skin is warm and dry  No rash noted  No erythema  No pallor  Psychiatric: She has a normal mood and affect  Her behavior is normal  Judgment and thought content normal        Discussion/Summary:  1  PAF- maintaining NSR on amiodarone    Continue same    Eliquis for CVA prevention  TFTs and LFTs good    No evidence for lung disease  2  HTN-well controlled on amlodipine/benazepril  Continue same  3  SSS-no evidence for bradycardia arrhythmias    Heart rate today 60      FU 6 months      Aki Garcia MD    Follow-up 6 months    Aki Garcia MD

## 2020-02-10 DIAGNOSIS — E03.9 ACQUIRED HYPOTHYROIDISM: ICD-10-CM

## 2020-02-10 RX ORDER — LEVOTHYROXINE SODIUM 0.07 MG/1
TABLET ORAL
Qty: 30 TABLET | Refills: 11 | Status: SHIPPED | OUTPATIENT
Start: 2020-02-10 | End: 2021-05-12

## 2020-03-09 DIAGNOSIS — I48.0 PAF (PAROXYSMAL ATRIAL FIBRILLATION) (HCC): ICD-10-CM

## 2020-03-10 RX ORDER — AMIODARONE HYDROCHLORIDE 100 MG/1
TABLET ORAL
Qty: 30 TABLET | Refills: 5 | Status: SHIPPED | OUTPATIENT
Start: 2020-03-10 | End: 2020-10-08

## 2020-04-27 DIAGNOSIS — I10 ESSENTIAL HYPERTENSION: ICD-10-CM

## 2020-04-27 RX ORDER — AMLODIPINE BESYLATE AND BENAZEPRIL HYDROCHLORIDE 5; 20 MG/1; MG/1
CAPSULE ORAL
Qty: 30 CAPSULE | Refills: 2 | Status: SHIPPED | OUTPATIENT
Start: 2020-04-27 | End: 2020-08-24

## 2020-04-28 ENCOUNTER — TELEMEDICINE (OUTPATIENT)
Dept: FAMILY MEDICINE CLINIC | Facility: CLINIC | Age: 85
End: 2020-04-28
Payer: COMMERCIAL

## 2020-04-28 VITALS
HEIGHT: 60 IN | WEIGHT: 101.5 LBS | HEART RATE: 68 BPM | DIASTOLIC BLOOD PRESSURE: 68 MMHG | TEMPERATURE: 97.4 F | OXYGEN SATURATION: 98 % | SYSTOLIC BLOOD PRESSURE: 144 MMHG | BODY MASS INDEX: 19.93 KG/M2

## 2020-04-28 DIAGNOSIS — E03.9 ACQUIRED HYPOTHYROIDISM: Primary | ICD-10-CM

## 2020-04-28 DIAGNOSIS — E78.2 MIXED HYPERLIPIDEMIA: ICD-10-CM

## 2020-04-28 DIAGNOSIS — I48.0 PAROXYSMAL ATRIAL FIBRILLATION (HCC): ICD-10-CM

## 2020-04-28 DIAGNOSIS — I10 BENIGN ESSENTIAL HTN: ICD-10-CM

## 2020-04-28 PROCEDURE — 99441 PR PHYS/QHP TELEPHONE EVALUATION 5-10 MIN: CPT | Performed by: FAMILY MEDICINE

## 2020-05-14 ENCOUNTER — TELEPHONE (OUTPATIENT)
Dept: FAMILY MEDICINE CLINIC | Facility: CLINIC | Age: 85
End: 2020-05-14

## 2020-05-14 DIAGNOSIS — I10 BENIGN ESSENTIAL HTN: Primary | ICD-10-CM

## 2020-05-14 RX ORDER — AMLODIPINE BESYLATE 2.5 MG/1
2.5 TABLET ORAL DAILY
Qty: 30 TABLET | Refills: 5 | Status: SHIPPED | OUTPATIENT
Start: 2020-05-14 | End: 2022-06-15

## 2020-05-26 DIAGNOSIS — I48.0 PAROXYSMAL ATRIAL FIBRILLATION (HCC): ICD-10-CM

## 2020-05-28 RX ORDER — APIXABAN 2.5 MG/1
TABLET, FILM COATED ORAL
Qty: 60 TABLET | Refills: 5 | Status: SHIPPED | OUTPATIENT
Start: 2020-05-28 | End: 2020-12-26

## 2020-06-22 ENCOUNTER — TELEPHONE (OUTPATIENT)
Dept: CARDIOLOGY CLINIC | Facility: CLINIC | Age: 85
End: 2020-06-22

## 2020-06-23 ENCOUNTER — TELEMEDICINE (OUTPATIENT)
Dept: CARDIOLOGY CLINIC | Facility: CLINIC | Age: 85
End: 2020-06-23
Payer: COMMERCIAL

## 2020-06-23 VITALS
DIASTOLIC BLOOD PRESSURE: 68 MMHG | SYSTOLIC BLOOD PRESSURE: 142 MMHG | BODY MASS INDEX: 19.22 KG/M2 | HEIGHT: 60 IN | HEART RATE: 58 BPM | OXYGEN SATURATION: 99 % | WEIGHT: 97.9 LBS

## 2020-06-23 DIAGNOSIS — I49.5 SICK SINUS SYNDROME (HCC): ICD-10-CM

## 2020-06-23 DIAGNOSIS — I48.0 PAROXYSMAL ATRIAL FIBRILLATION (HCC): Primary | ICD-10-CM

## 2020-06-23 DIAGNOSIS — I10 BENIGN ESSENTIAL HTN: ICD-10-CM

## 2020-06-23 PROCEDURE — 99441 PR PHYS/QHP TELEPHONE EVALUATION 5-10 MIN: CPT | Performed by: INTERNAL MEDICINE

## 2020-08-24 DIAGNOSIS — I10 ESSENTIAL HYPERTENSION: ICD-10-CM

## 2020-08-24 RX ORDER — AMLODIPINE BESYLATE AND BENAZEPRIL HYDROCHLORIDE 5; 20 MG/1; MG/1
CAPSULE ORAL
Qty: 30 CAPSULE | Refills: 10 | Status: SHIPPED | OUTPATIENT
Start: 2020-08-24 | End: 2021-08-24

## 2020-10-08 DIAGNOSIS — I48.0 PAF (PAROXYSMAL ATRIAL FIBRILLATION) (HCC): ICD-10-CM

## 2020-10-08 RX ORDER — AMIODARONE HYDROCHLORIDE 100 MG/1
TABLET ORAL
Qty: 30 TABLET | Refills: 5 | Status: SHIPPED | OUTPATIENT
Start: 2020-10-08 | End: 2020-12-06

## 2020-10-20 ENCOUNTER — TELEMEDICINE (OUTPATIENT)
Dept: FAMILY MEDICINE CLINIC | Facility: CLINIC | Age: 85
End: 2020-10-20
Payer: COMMERCIAL

## 2020-10-20 VITALS
RESPIRATION RATE: 16 BRPM | OXYGEN SATURATION: 99 % | HEART RATE: 66 BPM | TEMPERATURE: 98.6 F | DIASTOLIC BLOOD PRESSURE: 78 MMHG | BODY MASS INDEX: 20.27 KG/M2 | SYSTOLIC BLOOD PRESSURE: 154 MMHG | WEIGHT: 103.8 LBS

## 2020-10-20 DIAGNOSIS — E03.9 ACQUIRED HYPOTHYROIDISM: ICD-10-CM

## 2020-10-20 DIAGNOSIS — E78.2 MIXED HYPERLIPIDEMIA: ICD-10-CM

## 2020-10-20 DIAGNOSIS — I10 BENIGN ESSENTIAL HTN: Primary | ICD-10-CM

## 2020-10-20 DIAGNOSIS — I48.0 PAROXYSMAL ATRIAL FIBRILLATION (HCC): ICD-10-CM

## 2020-10-20 PROCEDURE — 99442 PR PHYS/QHP TELEPHONE EVALUATION 11-20 MIN: CPT | Performed by: FAMILY MEDICINE

## 2020-10-21 ENCOUNTER — TELEPHONE (OUTPATIENT)
Dept: FAMILY MEDICINE CLINIC | Facility: CLINIC | Age: 85
End: 2020-10-21

## 2020-10-28 ENCOUNTER — TELEPHONE (OUTPATIENT)
Dept: CARDIOLOGY CLINIC | Facility: CLINIC | Age: 85
End: 2020-10-28

## 2020-11-20 ENCOUNTER — TELEPHONE (OUTPATIENT)
Dept: CARDIOLOGY CLINIC | Facility: CLINIC | Age: 85
End: 2020-11-20

## 2020-11-23 ENCOUNTER — TELEPHONE (OUTPATIENT)
Dept: CARDIOLOGY CLINIC | Facility: CLINIC | Age: 85
End: 2020-11-23

## 2020-12-06 DIAGNOSIS — I48.0 PAF (PAROXYSMAL ATRIAL FIBRILLATION) (HCC): ICD-10-CM

## 2020-12-06 RX ORDER — AMIODARONE HYDROCHLORIDE 100 MG/1
TABLET ORAL
Qty: 30 TABLET | Refills: 4 | Status: SHIPPED | OUTPATIENT
Start: 2020-12-06 | End: 2020-12-11 | Stop reason: ALTCHOICE

## 2020-12-11 ENCOUNTER — OFFICE VISIT (OUTPATIENT)
Dept: CARDIOLOGY CLINIC | Facility: CLINIC | Age: 85
End: 2020-12-11
Payer: COMMERCIAL

## 2020-12-11 VITALS
BODY MASS INDEX: 20.46 KG/M2 | WEIGHT: 104.2 LBS | HEART RATE: 65 BPM | TEMPERATURE: 96.7 F | HEIGHT: 60 IN | DIASTOLIC BLOOD PRESSURE: 76 MMHG | SYSTOLIC BLOOD PRESSURE: 166 MMHG

## 2020-12-11 DIAGNOSIS — I49.5 SICK SINUS SYNDROME (HCC): ICD-10-CM

## 2020-12-11 DIAGNOSIS — I10 BENIGN ESSENTIAL HTN: ICD-10-CM

## 2020-12-11 DIAGNOSIS — I48.0 PAROXYSMAL ATRIAL FIBRILLATION (HCC): Primary | ICD-10-CM

## 2020-12-11 PROCEDURE — 93000 ELECTROCARDIOGRAM COMPLETE: CPT | Performed by: INTERNAL MEDICINE

## 2020-12-11 PROCEDURE — 99214 OFFICE O/P EST MOD 30 MIN: CPT | Performed by: INTERNAL MEDICINE

## 2020-12-26 DIAGNOSIS — I48.0 PAROXYSMAL ATRIAL FIBRILLATION (HCC): ICD-10-CM

## 2020-12-26 RX ORDER — APIXABAN 2.5 MG/1
TABLET, FILM COATED ORAL
Qty: 60 TABLET | Refills: 5 | Status: SHIPPED | OUTPATIENT
Start: 2020-12-26 | End: 2021-07-02 | Stop reason: SDUPTHER

## 2021-01-18 ENCOUNTER — TELEPHONE (OUTPATIENT)
Dept: CARDIOLOGY CLINIC | Facility: CLINIC | Age: 86
End: 2021-01-18

## 2021-01-18 NOTE — TELEPHONE ENCOUNTER
Pt called had an episode of elevated HR on Saturday that lasted approx 8 hours   she states he heart rate remained at 90 bpm for that period  Her BP was elevated and she took the extra 2 5 mg of amlodipine as instructed  She also followed her daughters recommendation an took a sip of Scotch and felt better  Today her HR is 58-60 and she feels fine  She had been on the amiodorone which was discontinued and stated Dr Rachel Menon said to call if HR is elevated  Please advise

## 2021-04-19 ENCOUNTER — TELEPHONE (OUTPATIENT)
Dept: CARDIOLOGY CLINIC | Facility: CLINIC | Age: 86
End: 2021-04-19

## 2021-04-19 NOTE — TELEPHONE ENCOUNTER
She is in afib all the time    nothing new    nurse should know that     reassure her HR of 80 is good and so is BP

## 2021-04-19 NOTE — TELEPHONE ENCOUNTER
Pt has been in atrial fib for a few days,is on her Eliquis went to nurse today /70  HR 80 irregular just not feeling herself then ended up in atrial fib   she has increased stress sister on deathbed,  has CA, and daughter fell and is in rehab  She feels okay right now but did state had some chest pressure earlier today please advise

## 2021-05-12 DIAGNOSIS — F32.A DEPRESSION, UNSPECIFIED DEPRESSION TYPE: ICD-10-CM

## 2021-05-12 DIAGNOSIS — E03.9 ACQUIRED HYPOTHYROIDISM: ICD-10-CM

## 2021-05-12 DIAGNOSIS — F41.9 ANXIETY: ICD-10-CM

## 2021-05-12 RX ORDER — LEVOTHYROXINE SODIUM 0.07 MG/1
TABLET ORAL
Qty: 30 TABLET | Refills: 10 | Status: SHIPPED | OUTPATIENT
Start: 2021-05-12 | End: 2022-06-15 | Stop reason: SDUPTHER

## 2021-05-12 RX ORDER — SERTRALINE HYDROCHLORIDE 25 MG/1
TABLET, FILM COATED ORAL
Qty: 30 TABLET | Refills: 10 | Status: SHIPPED | OUTPATIENT
Start: 2021-05-12 | End: 2022-07-21

## 2021-06-21 PROBLEM — I48.21 PERMANENT ATRIAL FIBRILLATION (HCC): Status: ACTIVE | Noted: 2017-10-27

## 2021-06-22 ENCOUNTER — OFFICE VISIT (OUTPATIENT)
Dept: CARDIOLOGY CLINIC | Facility: CLINIC | Age: 86
End: 2021-06-22
Payer: COMMERCIAL

## 2021-06-22 VITALS
WEIGHT: 103.4 LBS | BODY MASS INDEX: 20.3 KG/M2 | DIASTOLIC BLOOD PRESSURE: 70 MMHG | HEART RATE: 75 BPM | HEIGHT: 60 IN | OXYGEN SATURATION: 98 % | SYSTOLIC BLOOD PRESSURE: 138 MMHG

## 2021-06-22 DIAGNOSIS — R60.0 LOWER EXTREMITY EDEMA: ICD-10-CM

## 2021-06-22 DIAGNOSIS — I10 BENIGN ESSENTIAL HTN: ICD-10-CM

## 2021-06-22 DIAGNOSIS — I48.21 PERMANENT ATRIAL FIBRILLATION (HCC): Primary | ICD-10-CM

## 2021-06-22 DIAGNOSIS — I49.5 SICK SINUS SYNDROME (HCC): ICD-10-CM

## 2021-06-22 PROCEDURE — 99213 OFFICE O/P EST LOW 20 MIN: CPT | Performed by: INTERNAL MEDICINE

## 2021-06-22 NOTE — PROGRESS NOTES
Cardiology Follow Up    Sisi Park  3/28/1931  [id number]  [address] [facility] [id number]  [phone]      Reason for visit:  Six-month follow-up for permanent AFib, hypertension and sick sinus syndrome with history of junctional bradycardia      1  Permanent atrial fibrillation ([address] )     2  Benign essential HTN     3  Sick sinus syndrome ([address] )     4  Lower extremity edema         Interval History:  Since the patient's last visit, she denies chest pain, shortness of breath, palpitations, edema or lightheadedness      Patient Active Problem List   Diagnosis    Hypothyroidism    Anxiety    Depression    Mixed hyperlipidemia    Permanent atrial fibrillation (HCC)    Benign essential HTN    Junctional (jarett) bradycardia    Seasonal allergies    Sick sinus syndrome (HCC)    Spinal stenosis of lumbar region    High serum high density lipoprotein (HDL)    Lower extremity edema     Past Medical History:   Diagnosis Date    Anxiety     Depression     Fainting     Hypertension     Sick sinus syndrome ([address] )      Social History     Socioeconomic History    Marital status: /Civil Union     Spouse name: Not on file    Number of children: 4    Years of education: high school graduate     Highest education level: Not on file   Occupational History    Occupation: hosewife or homemaker    Tobacco Use    Smoking status: Former Smoker    Smokeless tobacco: Never Used   Substance and Sexual Activity    Alcohol use:  Yes    Drug use: No    Sexual activity: Not on file   Other Topics Concern    Not on file   Social History Narrative    Lives with spouse      Social Determinants of Health     Financial Resource Strain:     Difficulty of Paying Living Expenses:    Food Insecurity:     Worried About Running Out of Food in the Last Year:     [address] in the Last Year:    Transportation Needs:     Lack of Transportation (Medical):  Lack of Transportation (Non-Medical):    Physical Activity:     Days of Exercise per Week:     Minutes of Exercise per Session:    Stress:     Feeling of Stress :    Social Connections:     Frequency of Communication with Friends and Family:     Frequency of Social Gatherings with Friends and Family:     Attends Adventism Services:     Active Member of Clubs or Organizations:     Attends Club or Organization Meetings:     Marital Status:    Intimate Partner Violence:     Fear of Current or Ex-Partner:     Emotionally Abused:     Physically Abused:     Sexually Abused:       Family History   Problem Relation Age of Onset    Atrial fibrillation Sister     Other Father         cardiac pacemaker     Pancreatic cancer Brother      History reviewed  No pertinent surgical history  Current Outpatient Medications:     amLODIPine (NORVASC) 2 5 mg tablet, Take 1 tablet (2 5 mg total) by mouth daily Can be take in in addition to regular blood pressure meds if BP >160/85, Disp: 30 tablet, Rfl: 5    amLODIPine-benazepril (LOTREL 5-20) 5-20 MG per capsule, 1 CAP BY MOUTH ONCE DAILY, Disp: 30 capsule, Rfl: 10    Eliquis 2 5 MG, 1 TAB BY MOUTH TWICE DAILY, Disp: 60 tablet, Rfl: 5    levothyroxine 75 mcg tablet, 1 TAB BY MOUTH ONCE DAILY AS DIRECTED , Disp: 30 tablet, Rfl: 10    sertraline (ZOLOFT) 25 mg tablet, 1 TAB BY MOUTH ONCE DAILY W/ BREAKFAST, Disp: 30 tablet, Rfl: 10  Allergies   Allergen Reactions    Hydrochlorothiazide Rash    Losartan Palpitations    Penicillins Rash       Review of Systems:  Review of Systems   Constitutional: Negative for activity change, appetite change, fatigue and unexpected weight change  Respiratory: Positive for cough  Negative for chest tightness, shortness of breath and wheezing  Cardiovascular: Negative for chest pain, palpitations and leg swelling     Gastrointestinal: Negative for blood in stool, constipation and diarrhea  Genitourinary: Positive for frequency  Negative for hematuria  Musculoskeletal: Positive for back pain  Negative for arthralgias  Physical Exam:  Vitals:    06/22/21 1025   BP: 138/70   Pulse: 75   SpO2: 98%   Weight: 46 9 kg (103 lb 6 4 oz)   Height: 5' (1 524 m)       Physical Exam  Constitutional:       General: She is not in acute distress  Appearance: She is not ill-appearing  HENT:      Head: Normocephalic and atraumatic  Mouth/Throat:      Mouth: Mucous membranes are moist       Pharynx: No oropharyngeal exudate or posterior oropharyngeal erythema  Eyes:      General: No scleral icterus  Conjunctiva/sclera: Conjunctivae normal    Neck:      Thyroid: No thyroid mass or thyromegaly  Vascular: Normal carotid pulses  No carotid bruit or JVD  Cardiovascular:      Rate and Rhythm: Normal rate  Rhythm irregular  Pulses:           Dorsalis pedis pulses are 1+ on the right side and 1+ on the left side  Posterior tibial pulses are 2+ on the right side and 2+ on the left side  Heart sounds: No murmur heard  No friction rub  No gallop  Pulmonary:      Breath sounds: Normal breath sounds  No wheezing, rhonchi or rales  Abdominal:      Palpations: Abdomen is soft  There is no hepatomegaly, splenomegaly or mass  Tenderness: There is no abdominal tenderness  Musculoskeletal:         General: Swelling (One to 2+ edema of the right lower extremity with 1+ edema of the left lower extremity ) present  Cervical back: Neck supple  Neurological:      Mental Status: She is alert  Discussion/Summary:  1  Permanent AFib  Rate well controlled without specific rate control medications  On Eliquis 2 5 mg b i d  For stroke prevention  2  Hypertension  Well controlled on amlodipine/benazepril 5/20 daily  Has not had to take p r n  Amlodipine 2 5 mg for some time  3  Sick sinus syndrome  No symptoms to suggest symptomatic bradycardia    Heart rate today was 72 by palpation  4  Lower extremity edema  Mild  Does not represent congestive heart failure  Likely related to amlodipine      Follow-up 6 months      Diana De Jesus MD

## 2021-06-28 ENCOUNTER — RA CDI HCC (OUTPATIENT)
Dept: OTHER | Facility: HOSPITAL | Age: 86
End: 2021-06-28

## 2021-06-28 NOTE — PROGRESS NOTES
[place] [id number]  coding opportunities             Chart reviewed, (number of) suggestions sent to provider: 1     Problem listed updated   Provider Accepted, (number of) suggestions accepted: 0     Provider Rejected Suggestions for: f32 0            Patients insurance company: [facility] [address]  (Medicare Advantage and [email or web address])           [place] [address]  coding opportunities        Can the depression be further specified as:     F32 0 major depressive disorder, single episode, mild  OR   F32 1 major depressive disorder, single episode, moderate  OR   F32 2 major depressive disorder, single episode, severe without psychotic features OR   F32 4 major depressive disorder, single episode, in partial remission OR   F32 5 major depressive disorder, single episode, in full remission     Chart reviewed, (number of) suggestions sent to provider: 1                  Patients insurance company: [address]  (Medicare Advantage and [email or web address])

## 2021-07-02 ENCOUNTER — OFFICE VISIT (OUTPATIENT)
Dept: FAMILY MEDICINE CLINIC | Facility: CLINIC | Age: 86
End: 2021-07-02
Payer: COMMERCIAL

## 2021-07-02 DIAGNOSIS — I10 BENIGN ESSENTIAL HTN: ICD-10-CM

## 2021-07-02 DIAGNOSIS — E03.9 ACQUIRED HYPOTHYROIDISM: ICD-10-CM

## 2021-07-02 DIAGNOSIS — I48.0 PAROXYSMAL ATRIAL FIBRILLATION (HCC): ICD-10-CM

## 2021-07-02 DIAGNOSIS — E78.2 MIXED HYPERLIPIDEMIA: ICD-10-CM

## 2021-07-02 DIAGNOSIS — Z00.00 MEDICARE ANNUAL WELLNESS VISIT, SUBSEQUENT: Primary | ICD-10-CM

## 2021-07-02 PROCEDURE — G0439 PPPS, SUBSEQ VISIT: HCPCS | Performed by: FAMILY MEDICINE

## 2021-07-02 NOTE — PROGRESS NOTES
Assessment and Plan:   Kojo Landrum was seen today for medicare wellness visit  Diagnoses and all orders for this visit:    Medicare annual wellness visit, subsequent  -     CBC and Platelet; Future    Benign essential HTN  -     CBC and Platelet; Future  -     Comprehensive metabolic panel; Future    Acquired hypothyroidism  -     CBC and Platelet; Future  -     TSH, 3rd generation; Future  -     T4, free; Future    Mixed hyperlipidemia  -     CBC and Platelet; Future  -     Lipid Panel with Direct LDL reflex; Future    Paroxysmal atrial fibrillation (HCC)  -     apixaban (Eliquis) 2 5 mg; Take 1 tablet (2 5 mg total) by mouth 2 (two) times a day      Problem List Items Addressed This Visit        Endocrine    Hypothyroidism    Relevant Orders    CBC and Platelet    TSH, 3rd generation    T4, free       Cardiovascular and Mediastinum    Benign essential HTN    Relevant Orders    CBC and Platelet    Comprehensive metabolic panel       Other    Mixed hyperlipidemia    Relevant Orders    CBC and Platelet    Lipid Panel with Direct LDL reflex      Other Visit Diagnoses     Medicare annual wellness visit, subsequent    -  Primary    Relevant Orders    CBC and Platelet    Paroxysmal atrial fibrillation (HCC)        Relevant Medications    apixaban (Eliquis) 2 5 mg           Patient will be due for her full annual blood work in October as well as thyroid follow-up  Preventive health issues were discussed with patient, and age appropriate screening tests were ordered as noted in patient's After Visit Summary  Personalized health advice and appropriate referrals for health education or preventive services given if needed, as noted in patient's After Visit Summary  History of Present Illness:     Patient presents for Welcome to Medicare visit  Patient is also here with her   They live at [facility]  She is up-to-date with cardiology  She is compliant with her supplementation and medications     She declines immunizations with influenza and COVID    Patient Care Team:  Hang Sanabria DO as PCP - Landy Issa MD     Review of Systems:     Review of Systems   Constitutional: Negative for unexpected weight change  Fatigue:  energy level good  Respiratory: Negative for cough and shortness of breath  Musculoskeletal: Positive for arthralgias ( stable) and myalgias ( stable)  Walking steady with walker   Psychiatric/Behavioral:        Some stress with caring for her  with progressive CML      Problem List:     Patient Active Problem List   Diagnosis    Hypothyroidism    Anxiety    Depression    Mixed hyperlipidemia    Permanent atrial fibrillation (HCC)    Benign essential HTN    Junctional (jarett) bradycardia    Seasonal allergies    Sick sinus syndrome ([address]    Spinal stenosis of lumbar region    High serum high density lipoprotein (HDL)    Lower extremity edema      Past Medical and Surgical History:     Past Medical History:   Diagnosis Date    Anxiety     Depression     Fainting     Hypertension     Sick sinus syndrome ([address] )      History reviewed  No pertinent surgical history  Family History:     Family History   Problem Relation Age of Onset    Atrial fibrillation Sister     Other Father         cardiac pacemaker     Pancreatic cancer Brother       Social History:     Social History     Socioeconomic History    Marital status: /Civil Union     Spouse name: None    Number of children: 4    Years of education: high school graduate     Highest education level: None   Occupational History    Occupation: hosewife or homemaker    Tobacco Use    Smoking status: Former Smoker    Smokeless tobacco: Never Used   Substance and Sexual Activity    Alcohol use:  Yes    Drug use: No    Sexual activity: None   Other Topics Concern    None   Social History Narrative    Lives with spouse      Social Determinants of Health     Financial Resource Strain:     Difficulty of Paying Living Expenses:    Food Insecurity:     Worried About Running Out of Food in the Last Year:     [address] in the Last Year:    Transportation Needs:     Lack of Transportation (Medical):  Lack of Transportation (Non-Medical):    Physical Activity:     Days of Exercise per Week:     Minutes of Exercise per Session:    Stress:     Feeling of Stress :    Social Connections:     Frequency of Communication with Friends and Family:     Frequency of Social Gatherings with Friends and Family:     Attends Restoration Services:     Active Member of Clubs or Organizations:     Attends Club or Organization Meetings:     Marital Status:    Intimate Partner Violence:     Fear of Current or Ex-Partner:     Emotionally Abused:     Physically Abused:     Sexually Abused:       Medications and Allergies:     Current Outpatient Medications   Medication Sig Dispense Refill    amLODIPine (NORVASC) 2 5 mg tablet Take 1 tablet (2 5 mg total) by mouth daily Can be take in in addition to regular blood pressure meds if BP >160/85 30 tablet 5    amLODIPine-benazepril (LOTREL 5-20) 5-20 MG per capsule 1 CAP BY MOUTH ONCE DAILY 30 capsule 10    apixaban (Eliquis) 2 5 mg Take 1 tablet (2 5 mg total) by mouth 2 (two) times a day 60 tablet 5    levothyroxine 75 mcg tablet 1 TAB BY MOUTH ONCE DAILY AS DIRECTED  30 tablet 10    sertraline (ZOLOFT) 25 mg tablet 1 TAB BY MOUTH ONCE DAILY W/ BREAKFAST 30 tablet 10     No current facility-administered medications for this visit  Allergies   Allergen Reactions    Hydrochlorothiazide Rash    Losartan Palpitations    Penicillins Rash      Immunizations:     Immunization History   Administered Date(s) Administered    Pneumococcal Conjugate 13-Valent 09/11/2017    Pneumococcal Polysaccharide PPV23 09/11/2006      Health Maintenance: There are no preventive care reminders to display for this patient        Topic Date Due    COVID-19 Vaccine (1) Never done    Influenza Vaccine (1) 09/01/2021      Medicare Screening Tests and Risk Assessments:     Lyly Iniguez is here for her Subsequent Wellness visit  Health Risk Assessment:   Patient rates overall health as very good  Patient feels that their physical health rating is same  Patient is very satisfied with their life  Eyesight was rated as same  Hearing was rated as same  Patient feels that their emotional and mental health rating is same  Patients states they are never, rarely angry  Patient states they are never, rarely unusually tired/fatigued  Pain experienced in the last 7 days has been none  Patient states that she has experienced no weight loss or gain in last 6 months  Depression Screening:   PHQ-2 Score: 0  PHQ-9 Score: 0      Fall Risk Screening: In the past year, patient has experienced: no history of falling in past year      Urinary Incontinence Screening:   Patient has leaked urine accidently in the last six months  Home Safety:  Patient does not have trouble with stairs inside or outside of their home  Patient has working smoke alarms and has working carbon monoxide detector  Home safety hazards include: none  Nutrition:   Current diet is Regular  Medications:   Patient is not currently taking any over-the-counter supplements  Patient is able to manage medications  Activities of Daily Living (ADLs)/Instrumental Activities of Daily Living (IADLs):   Walk and transfer into and out of bed and chair?: Yes  Dress and groom yourself?: Yes    Bathe or shower yourself?: Yes    Feed yourself? Yes  Do your laundry/housekeeping?: No  Make your own meals?: Yes    Do your own shopping?: No    Previous Hospitalizations:   Any hospitalizations or ED visits within the last 12 months?: No      Advance Care Planning:   Living will: Yes    Durable POA for healthcare:  Yes    Advanced directive: Yes    Five wishes given: Yes    End of Life Decisions reviewed with patient: Yes      Cognitive Screening:   Provider or family/friend/caregiver concerned regarding cognition?: No    PREVENTIVE SCREENINGS      Cardiovascular Screening:    General: Screening Not Indicated      Colorectal Cancer Screening:     General: Screening Not Indicated      Breast Cancer Screening:     General: Screening Not Indicated      Cervical Cancer Screening:    General: Screening Not Indicated      Osteoporosis Screening:    General: Risks and Benefits Discussed and Patient Declines      Abdominal Aortic Aneurysm (AAA) Screening:        General: Screening Not Indicated      Lung Cancer Screening:     General: Screening Not Indicated    Screening, Brief Intervention, and Referral to Treatment (SBIRT)    Screening  Typical number of drinks in a day: 0  Typical number of drinks in a week: 2  Interpretation: Low risk drinking behavior  Single Item Drug Screening:  How often have you used an illegal drug (including marijuana) or a prescription medication for non-medical reasons in the past year? never    Single Item Drug Screen Score: 0  Interpretation: Negative screen for possible drug use disorder    No exam data present     Physical Exam:     /68   Pulse 75   Temp (!) 96 7 °F (35 9 °C) (Temporal)   Resp 16   Ht 5' (1 524 m)   Wt 47 kg (103 lb 9 6 oz)   SpO2 99%   BMI 20 23 kg/m²     Physical Exam  Vitals and nursing note reviewed  Constitutional:       General: She is not in acute distress  Appearance: She is well-developed  Comments: Pleasant 80-year-old female who appears younger than stated age   HENT:      Head: Normocephalic and atraumatic  Eyes:      Conjunctiva/sclera: Conjunctivae normal    Cardiovascular:      Rate and Rhythm: Normal rate and regular rhythm  Heart sounds: Murmur heard  Pulmonary:      Effort: Pulmonary effort is normal  No respiratory distress  Breath sounds: Normal breath sounds  Abdominal:      Palpations: Abdomen is soft  Tenderness:  There is no abdominal tenderness  Musculoskeletal:      Cervical back: Neck supple  Right lower leg: Right lower leg edema:  trace  Left lower leg: Left lower leg edema:  trace  Skin:     General: Skin is warm and dry  Neurological:      Mental Status: She is alert and oriented to person, place, and time  Psychiatric:         Mood and Affect: Mood normal          Behavior: Behavior normal          Thought Content:  Thought content normal          Judgment: Judgment normal           Antonio Gregory, DO

## 2021-07-02 NOTE — PATIENT INSTRUCTIONS
Medicare Preventive Visit Patient Instructions  Thank you for completing your Welcome to Medicare Visit or Medicare Annual Wellness Visit today  Your next wellness visit will be due in one year (7/3/2022)  The screening/preventive services that you may require over the next 5-10 years are detailed below  Some tests may not apply to you based off risk factors and/or age  Screening tests ordered at today's visit but not completed yet may show as past due  Also, please note that scanned in results may not display below  Preventive Screenings:  Service Recommendations Previous Testing/Comments   Colorectal Cancer Screening  * Colonoscopy    * Fecal Occult Blood Test (FOBT)/Fecal Immunochemical Test (FIT)  * Fecal DNA/Cologuard Test  * Flexible Sigmoidoscopy Age: 54-65 years old   Colonoscopy: every 10 years (may be performed more frequently if at higher risk)  OR  FOBT/FIT: every 1 year  OR  Cologuard: every 3 years  OR  Sigmoidoscopy: every 5 years  Screening may be recommended earlier than age 48 if at higher risk for colorectal cancer  Also, an individualized decision between you and your healthcare provider will decide whether screening between the ages of 74-80 would be appropriate  Colonoscopy: Not on file  FOBT/FIT: Not on file  Cologuard: Not on file  Sigmoidoscopy: Not on file    Screening Not Indicated     Breast Cancer Screening Age: 36 years old  Frequency: every 1-2 years  Not required if history of left and right mastectomy Mammogram: Not on file        Cervical Cancer Screening Between the ages of 21-29, pap smear recommended once every 3 years  Between the ages of 33-67, can perform pap smear with HPV co-testing every 5 years     Recommendations may differ for women with a history of total hysterectomy, cervical cancer, or abnormal pap smears in past  Pap Smear: Not on file    Screening Not Indicated   Hepatitis C Screening Once for adults born between 1945 and 1965  More frequently in patients at high risk for Hepatitis C Hep C Antibody: Not on file        Diabetes Screening 1-2 times per year if you're at risk for diabetes or have pre-diabetes Fasting glucose: No results in last 5 years   A1C: No results in last 5 years        Cholesterol Screening Once every 5 years if you don't have a lipid disorder  May order more often based on risk factors  Lipid panel: Not on file    Screening Not Indicated  History Lipid Disorder     Other Preventive Screenings Covered by Medicare:  1  Abdominal Aortic Aneurysm (AAA) Screening: covered once if your at risk  You're considered to be at risk if you have a family history of AAA  2  Lung Cancer Screening: covers low dose CT scan once per year if you meet all of the following conditions: (1) Age 50-69; (2) No signs or symptoms of lung cancer; (3) Current smoker or have quit smoking within the last 15 years; (4) You have a tobacco smoking history of at least 30 pack years (packs per day multiplied by number of years you smoked); (5) You get a written order from a healthcare provider  3  Glaucoma Screening: covered annually if you're considered high risk: (1) You have diabetes OR (2) Family history of glaucoma OR (3)  aged 48 and older OR (3)  American aged 72 and older  3  Osteoporosis Screening: covered every 2 years if you meet one of the following conditions: (1) You're estrogen deficient and at risk for osteoporosis based off medical history and other findings; (2) Have a vertebral abnormality; (3) On glucocorticoid therapy for more than 3 months; (4) Have primary hyperparathyroidism; (5) On osteoporosis medications and need to assess response to drug therapy  · Last bone density test (DXA Scan): Not on file  5  HIV Screening: covered annually if you're between the age of 12-76  Also covered annually if you are younger than 13 and older than 72 with risk factors for HIV infection   For pregnant patients, it is covered up to 3 times per pregnancy  Immunizations:  Immunization Recommendations   Influenza Vaccine Annual influenza vaccination during flu season is recommended for all persons aged >= 6 months who do not have contraindications   Pneumococcal Vaccine (Prevnar and Pneumovax)  * Prevnar = PCV13  * Pneumovax = PPSV23   Adults 25-60 years old: 1-3 doses may be recommended based on certain risk factors  Adults 72 years old: Prevnar (PCV13) vaccine recommended followed by Pneumovax (PPSV23) vaccine  If already received PPSV23 since turning 65, then PCV13 recommended at least one year after PPSV23 dose  Hepatitis B Vaccine 3 dose series if at intermediate or high risk (ex: diabetes, end stage renal disease, liver disease)   Tetanus (Td) Vaccine - COST NOT COVERED BY MEDICARE PART B Following completion of primary series, a booster dose should be given every 10 years to maintain immunity against tetanus  Td may also be given as tetanus wound prophylaxis  Tdap Vaccine - COST NOT COVERED BY MEDICARE PART B Recommended at least once for all adults  For pregnant patients, recommended with each pregnancy  Shingles Vaccine (Shingrix) - COST NOT COVERED BY MEDICARE PART B  2 shot series recommended in those aged 48 and above     Health Maintenance Due:  There are no preventive care reminders to display for this patient  Immunizations Due:      Topic Date Due    COVID-19 Vaccine (1) Never done    Influenza Vaccine (1) 09/01/2021     Advance Directives   What are advance directives? Advance directives are legal documents that state your wishes and plans for medical care  These plans are made ahead of time in case you lose your ability to make decisions for yourself  Advance directives can apply to any medical decision, such as the treatments you want, and if you want to donate organs  What are the types of advance directives? There are many types of advance directives, and each state has rules about how to use them   You may choose a combination of any of the following:  · Living will: This is a written record of the treatment you want  You can also choose which treatments you do not want, which to limit, and which to stop at a certain time  This includes surgery, medicine, IV fluid, and tube feedings  · Durable power of  for healthcare [facility]): This is a written record that states who you want to make healthcare choices for you when you are unable to make them for yourself  This person, called a proxy, is usually a family member or a friend  You may choose more than 1 proxy  · Do not resuscitate (DNR) order:  A DNR order is used in case your heart stops beating or you stop breathing  It is a request not to have certain forms of treatment, such as CPR  A DNR order may be included in other types of advance directives  · Medical directive: This covers the care that you want if you are in a coma, near death, or unable to make decisions for yourself  You can list the treatments you want for each condition  Treatment may include pain medicine, surgery, blood transfusions, dialysis, IV or tube feedings, and a ventilator (breathing machine)  · Values history: This document has questions about your views, beliefs, and how you feel and think about life  This information can help others choose the care that you would choose  Why are advance directives important? An advance directive helps you control your care  Although spoken wishes may be used, it is better to have your wishes written down  Spoken wishes can be misunderstood, or not followed  Treatments may be given even if you do not want them  An advance directive may make it easier for your family to make difficult choices about your care  Urinary Incontinence   Urinary incontinence (UI)  is when you lose control of your bladder  UI develops because your bladder cannot store or empty urine properly   The 3 most common types of UI are stress incontinence, urge incontinence, or both   Medicines:   · May be given to help strengthen your bladder control  Report any side effects of medication to your healthcare provider  Do pelvic muscle exercises often:  Your pelvic muscles help you stop urinating  Squeeze these muscles tight for 5 seconds, then relax for 5 seconds  Gradually work up to squeezing for 10 seconds  Do 3 sets of 15 repetitions a day, or as directed  This will help strengthen your pelvic muscles and improve bladder control  Train your bladder:  Go to the bathroom at set times, such as every 2 hours, even if you do not feel the urge to go  You can also try to hold your urine when you feel the urge to go  For example, hold your urine for 5 minutes when you feel the urge to go  As that becomes easier, hold your urine for 10 minutes  Self-care:   · Keep a UI record  Write down how often you leak urine and how much you leak  Make a note of what you were doing when you leaked urine  · Drink liquids as directed  You may need to limit the amount of liquid you drink to help control your urine leakage  Do not drink any liquid right before you go to bed  Limit or do not have drinks that contain caffeine or alcohol  · Prevent constipation  Eat a variety of high-fiber foods  Good examples are high-fiber cereals, beans, vegetables, and whole-grain breads  Walking is the best way to trigger your intestines to have a bowel movement  · Exercise regularly and maintain a healthy weight  Weight loss and exercise will decrease pressure on your bladder and help you control your leakage  · Use a catheter as directed  to help empty your bladder  A catheter is a tiny, plastic tube that is put into your bladder to drain your urine  · Go to behavior therapy as directed  Behavior therapy may be used to help you learn to control your urge to urinate         © Copyright [employer] 2018 Information is for End User's use only and may not be sold, redistributed or otherwise used for commercial purposes   All illustrations and images included in CareNotes® are the copyrighted property of A D A M , Inc  or [facility] Atul Alvares

## 2021-07-11 VITALS
OXYGEN SATURATION: 99 % | HEART RATE: 75 BPM | HEIGHT: 60 IN | BODY MASS INDEX: 20.34 KG/M2 | DIASTOLIC BLOOD PRESSURE: 68 MMHG | TEMPERATURE: 96.7 F | RESPIRATION RATE: 16 BRPM | WEIGHT: 103.6 LBS | SYSTOLIC BLOOD PRESSURE: 122 MMHG

## 2021-07-12 ENCOUNTER — TELEPHONE (OUTPATIENT)
Dept: CARDIOLOGY CLINIC | Facility: CLINIC | Age: 86
End: 2021-07-12

## 2021-07-12 NOTE — TELEPHONE ENCOUNTER
Always in afib  Syble Anatoly likely some acceleration   Syble Anatoly no change in meds    no need to call

## 2021-07-12 NOTE — TELEPHONE ENCOUNTER
Pt calling with weekend episodes of AF  Pt states Friday to Saturday was not "That bad"  Pt does state Sunday's episode of AF lasted all day with HR around 110  Pt states this has stopped and feels better but did want Dr Becerra Officer to be notified

## 2021-08-24 DIAGNOSIS — I10 ESSENTIAL HYPERTENSION: ICD-10-CM

## 2021-08-24 RX ORDER — AMLODIPINE BESYLATE AND BENAZEPRIL HYDROCHLORIDE 5; 20 MG/1; MG/1
CAPSULE ORAL
Qty: 30 CAPSULE | Refills: 5 | Status: SHIPPED | OUTPATIENT
Start: 2021-08-24 | End: 2022-03-25

## 2021-09-03 ENCOUNTER — TELEPHONE (OUTPATIENT)
Dept: CARDIOLOGY CLINIC | Facility: CLINIC | Age: 86
End: 2021-09-03

## 2021-09-03 NOTE — TELEPHONE ENCOUNTER
PC from patient with same complaint as in July that she is in AF  She has persistent AF but watches her BP monitor for heart rate, all the time, and was not aware that she will have an irregular heart rate with this diagnosis  She was worried that it was 87 one minute and 64 and 113 another minute  She is asymptomatic and feels fine  Reassured her, for she has herself all upset and "took a xanax" to calm herself down

## 2021-12-28 ENCOUNTER — OFFICE VISIT (OUTPATIENT)
Dept: CARDIOLOGY CLINIC | Facility: CLINIC | Age: 86
End: 2021-12-28
Payer: COMMERCIAL

## 2021-12-28 VITALS
HEART RATE: 74 BPM | DIASTOLIC BLOOD PRESSURE: 80 MMHG | BODY MASS INDEX: 19.87 KG/M2 | WEIGHT: 101.2 LBS | SYSTOLIC BLOOD PRESSURE: 124 MMHG | HEIGHT: 60 IN

## 2021-12-28 DIAGNOSIS — I48.0 PAROXYSMAL ATRIAL FIBRILLATION (HCC): Primary | ICD-10-CM

## 2021-12-28 DIAGNOSIS — I49.5 SICK SINUS SYNDROME (HCC): ICD-10-CM

## 2021-12-28 DIAGNOSIS — I10 BENIGN ESSENTIAL HTN: ICD-10-CM

## 2021-12-28 DIAGNOSIS — R60.0 LOWER EXTREMITY EDEMA: ICD-10-CM

## 2021-12-28 PROCEDURE — 1160F RVW MEDS BY RX/DR IN RCRD: CPT | Performed by: INTERNAL MEDICINE

## 2021-12-28 PROCEDURE — 93000 ELECTROCARDIOGRAM COMPLETE: CPT | Performed by: INTERNAL MEDICINE

## 2021-12-28 PROCEDURE — 99214 OFFICE O/P EST MOD 30 MIN: CPT | Performed by: INTERNAL MEDICINE

## 2022-02-14 ENCOUNTER — TELEPHONE (OUTPATIENT)
Dept: CARDIOLOGY CLINIC | Facility: CLINIC | Age: 87
End: 2022-02-14

## 2022-02-14 DIAGNOSIS — I48.0 PAROXYSMAL ATRIAL FIBRILLATION (HCC): Primary | ICD-10-CM

## 2022-02-14 RX ORDER — METOPROLOL SUCCINATE 25 MG/1
12.5 TABLET, EXTENDED RELEASE ORAL DAILY
Qty: 15 TABLET | Refills: 5 | Status: SHIPPED | OUTPATIENT
Start: 2022-02-14 | End: 2022-06-15 | Stop reason: SDUPTHER

## 2022-02-14 NOTE — TELEPHONE ENCOUNTER
Called pt    has PAF    hydrate    if rates continue to accelerate will add prn metoprolol 25 mg daily    have to be careful since she has had jx rhythm in past although HR was 74 in SR at last OV

## 2022-02-14 NOTE — PROGRESS NOTES
Patient still with palps    had been on as low as 12 5 mg BID of metoprolol in past  will try 12 5 mg XL daily and monitor her sx and HR-sent to her pharmacy-called pt and discussed with her

## 2022-02-14 NOTE — TELEPHONE ENCOUNTER
Patient called, states she spoke with Dr Cortes Gandhi, states she has been hydrating  States the tried to lay down, but had to get up to go to the bathroom twice  Feels it is due to her rapid heart rate  Is just feeling exhausted  Asking if Dr Cortes Gandhi can send script to [facility]?

## 2022-02-14 NOTE — TELEPHONE ENCOUNTER
Patient called, states she does have chronic a fib, does have periods of palpitations  She has had a few periods over the last few days, her HR ranges from   /80  She knows this does happen, does not have chest pain or shortness of breath  She does feel more tired just today  She just wanted to let you know     Patient does have 6 month follow up in June 2022

## 2022-05-11 NOTE — PLAN OF CARE
CARDIOVASCULAR - ADULT     Maintains optimal cardiac output and hemodynamic stability Progressing        DISCHARGE PLANNING     Discharge to home or other facility with appropriate resources Progressing        DISCHARGE PLANNING - CARE MANAGEMENT     Discharge to post-acute care or home with appropriate resources Progressing        INFECTION - ADULT     Absence or prevention of progression during hospitalization Progressing     Absence of fever/infection during neutropenic period Progressing        Knowledge Deficit     Patient/family/caregiver demonstrates understanding of disease process, treatment plan, medications, and discharge instructions Progressing        METABOLIC, FLUID AND ELECTROLYTES - ADULT     Electrolytes maintained within normal limits Progressing     Fluid balance maintained Progressing        NEUROSENSORY - ADULT     Achieves stable or improved neurological status Progressing     Achieves maximal functionality and self care Progressing        PAIN - ADULT     Verbalizes/displays adequate comfort level or baseline comfort level Progressing        Potential for Falls     Patient will remain free of falls Progressing        SAFETY ADULT     Maintain or return to baseline ADL function Progressing     Maintain or return mobility status to optimal level Progressing        SKIN/TISSUE INTEGRITY - ADULT     Skin integrity remains intact Progressing Implemented All Universal Safety Interventions:  [place] to call system. Call bell, personal items and telephone within reach. Instruct patient to call for assistance. Room bathroom lighting operational. Non-slip footwear when patient is off stretcher. Physically safe environment: no spills, clutter or unnecessary equipment. Stretcher in lowest position, wheels locked, appropriate side rails in place.

## 2022-05-18 ENCOUNTER — TRANSCRIBE ORDERS (OUTPATIENT)
Dept: FAMILY MEDICINE CLINIC | Facility: CLINIC | Age: 87
End: 2022-05-18

## 2022-05-18 ENCOUNTER — TELEPHONE (OUTPATIENT)
Dept: FAMILY MEDICINE CLINIC | Facility: CLINIC | Age: 87
End: 2022-05-18

## 2022-05-18 DIAGNOSIS — F41.9 ANXIETY: Primary | ICD-10-CM

## 2022-05-18 RX ORDER — ALPRAZOLAM 0.25 MG/1
0.25 TABLET ORAL
Qty: 30 TABLET | Refills: 0 | Status: SHIPPED | OUTPATIENT
Start: 2022-05-18 | End: 2023-03-30

## 2022-05-18 NOTE — TELEPHONE ENCOUNTER
Patient has been taking Xanax from 2016 for the past few days, and she is wondering if she can get a refill sent to [facility] [address]  Patient states her  is in the hospital and this has been helping

## 2022-06-10 ENCOUNTER — HOSPITAL ENCOUNTER (EMERGENCY)
Facility: HOSPITAL | Age: 87
Discharge: HOME | End: 2022-06-10
Attending: EMERGENCY MEDICINE | Admitting: EMERGENCY MEDICINE
Payer: COMMERCIAL

## 2022-06-10 ENCOUNTER — APPOINTMENT (EMERGENCY)
Dept: RADIOLOGY | Facility: HOSPITAL | Age: 87
End: 2022-06-10
Payer: COMMERCIAL

## 2022-06-10 VITALS
TEMPERATURE: 98 F | DIASTOLIC BLOOD PRESSURE: 86 MMHG | HEART RATE: 91 BPM | OXYGEN SATURATION: 99 % | RESPIRATION RATE: 18 BRPM | SYSTOLIC BLOOD PRESSURE: 176 MMHG

## 2022-06-10 DIAGNOSIS — S43.409A SHOULDER SPRAIN: ICD-10-CM

## 2022-06-10 DIAGNOSIS — M25.512 LEFT SHOULDER PAIN: Primary | ICD-10-CM

## 2022-06-10 DIAGNOSIS — I48.0 PAROXYSMAL ATRIAL FIBRILLATION (HCC): ICD-10-CM

## 2022-06-10 LAB
ATRIAL RATE: 60 BPM
QRS AXIS: 120 DEGREES
QRSD INTERVAL: 76 MS
QT INTERVAL: 328 MS
QTC INTERVAL: 418 MS
T WAVE AXIS: 69 DEGREES
VENTRICULAR RATE: 98 BPM

## 2022-06-10 PROCEDURE — 93010 ELECTROCARDIOGRAM REPORT: CPT | Performed by: INTERNAL MEDICINE

## 2022-06-10 PROCEDURE — 99285 EMERGENCY DEPT VISIT HI MDM: CPT

## 2022-06-10 PROCEDURE — 93005 ELECTROCARDIOGRAM TRACING: CPT

## 2022-06-10 PROCEDURE — 99283 EMERGENCY DEPT VISIT LOW MDM: CPT

## 2022-06-10 PROCEDURE — 73030 X-RAY EXAM OF SHOULDER: CPT

## 2022-06-10 RX ORDER — LIDOCAINE 50 MG/G
1 PATCH TOPICAL DAILY
Qty: 7 PATCH | Refills: 0 | Status: SHIPPED | OUTPATIENT
Start: 2022-06-10 | End: 2022-06-15 | Stop reason: SDUPTHER

## 2022-06-10 RX ORDER — ACETAMINOPHEN 325 MG/1
650 TABLET ORAL ONCE
Status: COMPLETED | OUTPATIENT
Start: 2022-06-10 | End: 2022-06-10

## 2022-06-10 RX ORDER — LIDOCAINE 50 MG/G
1 PATCH TOPICAL ONCE
Status: DISCONTINUED | OUTPATIENT
Start: 2022-06-10 | End: 2022-06-10 | Stop reason: HOSPADM

## 2022-06-10 RX ADMIN — LIDOCAINE 1 PATCH: 50 PATCH TOPICAL at 10:04

## 2022-06-10 RX ADMIN — ACETAMINOPHEN 650 MG: 325 TABLET, FILM COATED ORAL at 10:00

## 2022-06-10 NOTE — ED PROVIDER NOTES
History  Chief Complaint   Patient presents with    Shoulder Pain     Reporting left shoulder pain  States has hx of bursitis  No new injury to shoulder  Taking advil without relief  80 YOF with PMH afib on Eliquis, HTN, HLD who presents with left shoulder pain  The pain has been present for about 2-3 weeks and has been taking Aleve with no relief  Patient denies any numbness, tingling or weakness in the left arm  Patient denies any falls or injury to the shoulder  Denies sleeping on it oddly  The pain does not radiate into neck, back or down arm  Daughter, who accompanies her, states that pt may have been diagnosed with bursitis many years ago  Pt does live at [facility] and walks to see her  who lives across the street using a walker- walker does not seem to irritate the pain  Prior to Admission Medications   Prescriptions Last Dose Informant Patient Reported? Taking? ALPRAZolam (XANAX) 0 25 mg tablet   No No   Sig: Take 1 tablet (0 25 mg total) by mouth daily at bedtime as needed for anxiety   Eliquis 2 5 MG   No No   Si TAB BY MOUTH TWICE DAILY   amLODIPine (NORVASC) 2 5 mg tablet  Self No No   Sig: Take 1 tablet (2 5 mg total) by mouth daily Can be take in in addition to regular blood pressure meds if BP >160/85   amLODIPine-benazepril (LOTREL 5-20) 5-20 MG per capsule   No No   Si CAP BY MOUTH ONCE DAILY   levothyroxine 75 mcg tablet  Self No No   Si TAB BY MOUTH ONCE DAILY AS DIRECTED    metoprolol succinate (TOPROL-XL) 25 mg 24 hr tablet   No No   Sig: Take 0 5 tablets (12 5 mg total) by mouth daily   sertraline (ZOLOFT) 25 mg tablet  Self No No   Si TAB BY MOUTH ONCE DAILY W/ BREAKFAST      Facility-Administered Medications: None       Past Medical History:   Diagnosis Date    [facility]fib ([facility] )     Anxiety     Depression     Fainting     Hypertension     Sick sinus syndrome ([facility] )        History reviewed  No pertinent surgical history      Family History Problem Relation Age of Onset    Atrial fibrillation Sister     Other Father         cardiac pacemaker     Pancreatic cancer Brother      I have reviewed and agree with the history as documented  E-Cigarette/Vaping     E-Cigarette/Vaping Substances     Social History     Tobacco Use    Smoking status: Former Smoker    Smokeless tobacco: Never Used   Substance Use Topics    Alcohol use: Yes    Drug use: No       Review of Systems   Constitutional: Negative for chills and fever  HENT: Negative for ear pain and sore throat  Eyes: Negative for pain and visual disturbance  Respiratory: Negative for cough and shortness of breath  Cardiovascular: Negative for chest pain and palpitations  Gastrointestinal: Negative for abdominal pain and vomiting  Genitourinary: Negative for dysuria and hematuria  Musculoskeletal: Positive for arthralgias (left shoulder)  Negative for back pain and neck pain  Skin: Negative for color change and rash  Neurological: Negative for dizziness, seizures, syncope and headaches  All other systems reviewed and are negative  Physical Exam  Physical Exam  Vitals and nursing note reviewed  Constitutional:       General: She is not in acute distress  Appearance: Normal appearance  She is well-developed  She is not ill-appearing  HENT:      Head: Normocephalic and atraumatic  Nose: Nose normal       Mouth/Throat:      Mouth: Mucous membranes are moist    Eyes:      Extraocular Movements: Extraocular movements intact  Conjunctiva/sclera: Conjunctivae normal    Cardiovascular:      Rate and Rhythm: Normal rate and regular rhythm  Heart sounds: No murmur heard  Pulmonary:      Effort: Pulmonary effort is normal  No respiratory distress  Breath sounds: Normal breath sounds  Abdominal:      General: There is no distension  Palpations: Abdomen is soft  Tenderness: There is no abdominal tenderness     Musculoskeletal:         General: Tenderness present  No swelling or deformity  Right shoulder: Tenderness (anterior aspect) present  No bony tenderness or crepitus  Normal strength  Normal pulse  Left shoulder: Normal       Cervical back: Normal range of motion and neck supple  Comments: Pain with external rotation of left arm    Skin:     General: Skin is warm and dry  Capillary Refill: Capillary refill takes less than 2 seconds  Neurological:      General: No focal deficit present  Mental Status: She is alert and oriented to person, place, and time  Psychiatric:         Mood and Affect: Mood normal          Behavior: Behavior normal          Vital Signs  ED Triage Vitals [06/10/22 0859]   Temperature Pulse Respirations Blood Pressure SpO2   98 °F (36 7 °C) (S) (!) 120 18 144/88 100 %      Temp src Heart Rate Source Patient Position - Orthostatic VS BP Location FiO2 (%)   -- Monitor Lying Right arm --      Pain Score       10 - Worst Possible Pain           Vitals:    06/10/22 0859 06/10/22 0958 06/10/22 1102   BP: 144/88  (!) 176/86   Pulse: (S) (!) 120 (!) 107 91   Patient Position - Orthostatic VS: Lying  Lying         Visual Acuity      ED Medications  Medications   lidocaine (LIDODERM) 5 % patch 1 patch (1 patch Topical Medication Applied 6/10/22 1004)   acetaminophen (TYLENOL) tablet 650 mg (650 mg Oral Given 6/10/22 1000)       Diagnostic Studies  Results Reviewed     None                 XR shoulder 2+ views LEFT   ED Interpretation by Jean Akbar PA-C (06/10 1124)   No acute findings      Final Result by Jeff Euceda MD (06/10 1123)      No acute osseous abnormality        Workstation performed: [id number]                    Procedures  ECG 12 Lead Documentation Only    Date/Time: 6/10/2022 12:12 PM  Performed by: Jean Akbar PA-C  Authorized by: Jean Akbar PA-C     ECG reviewed by me, the ED Provider: yes    Patient location:  ED  Previous ECG:     Previous ECG: Unavailable  Interpretation:     Interpretation: abnormal    Rate:     ECG rate:  98    ECG rate assessment: normal    Rhythm:     Rhythm: atrial fibrillation    Ectopy:     Ectopy: none    QRS:     QRS axis:  Normal  Conduction:     Conduction: normal    ST segments:     ST segments:  Normal  T waves:     T waves: normal               ED Course  ED Course as of 06/10/22 1216   Fri Freeman 10, 2022   1014 Pulse(!): 107  Due to history of afib will obtain ECG   1124 XR shoulder 2+ views LEFT  No acute osseous abnormality  1145 ECG: afib with rate of 98  MDM  Number of Diagnoses or Management Options  Left shoulder pain: new and requires workup  Paroxysmal atrial fibrillation ([facility] ): established and improving  Shoulder sprain: new and requires workup  Diagnosis management comments: 80 YOF with PMH afib on Eliquis, HTN, HLD presents with left shoulder pain x2-3 weeks  Denies falls or injury  Physical exam revealed intact pulses and sensory in left arm  Strength 5/5  Only pain with active external rotation of shoulder  Xray reviewed by me which showed no acute findings  Suspect this is muscular/tendon in nature  Referral for ortho was placed  In regards to afib, pt was tachy on arrival, ECG was obtained which did show ECG however rate at 98 bpm now  Pt denied palpitations, LH, dizziness  Pt reports she is always in afib and takes her pulse at home and it tends to be in the 90s  Pt did not take medications this AM  I instructed her to call her cardiologist and tell him about the rate to see if he has recommendations  She does have a follow up scheduled for 6/24  Discussed the plan with her daughter as well, she is in agreement  I have discussed the plan to discharge pt from ED   The patient was discharged in stable condition   Patient ambulated off the department   Extensive return to emergency department precautions were discussed   Follow up with appropriate providers including primary care physician was discussed   Patient and/or their  primary decision maker expressed understanding  Luis Robertson remained stable during entire emergency department stay  Amount and/or Complexity of Data Reviewed  Tests in the radiology section of CPT®: ordered and reviewed  Decide to obtain previous medical records or to obtain history from someone other than the patient: yes  Obtain history from someone other than the patient: yes  Review and summarize past medical records: yes  Independent visualization of images, tracings, or specimens: yes    Patient Progress  Patient progress: stable      Disposition  Final diagnoses:   Left shoulder pain   Shoulder sprain   Paroxysmal atrial fibrillation ([address] )     Time reflects when diagnosis was documented in both MDM as applicable and the Disposition within this note     Time User Action Codes Description Comment    6/10/2022 11:25 AM Edil Blend Add [M25 511] Right shoulder pain     6/10/2022 11:25 AM Edil Blend Remove [M25 511] Right shoulder pain     6/10/2022 11:25 AM Edil Blend Add [M25 512] Left shoulder pain     6/10/2022 11:26 AM Edil Blend Add [S43 409A] Shoulder sprain     6/10/2022 12:14 PM Edil Blend Add [I48 0] Paroxysmal atrial fibrillation [facility])       ED Disposition     ED Disposition   Discharge    Condition   Stable    Date/Time   Fri Freeman 10, 2022 11:46 AM    Comment   Suresh Abreu discharge to home/self care  Follow-up Information     Follow up With Specialties Details Why Contact Info Additional Information    Joel Nguyen,  Family Medicine Schedule an appointment as soon as possible for a visit   [address]  [place] [facility] Emergency Medicine  If symptoms worsen [facility] [address]  [address] [place] [address] Emergency Department, [address] [place] [address], [address] [facility] Orthopedic Surgery Schedule an appointment as soon as possible for a visit   [address] [facility] [place]  [address] [phone] [address], [address] [facility], [address] [facility] [place], [place], [address]   [address]    As needed [phone]       Guera Willoughby MD Cardiology Schedule an appointment as soon as possible for a visit   [address] [facility] [address]  [phone]             Patient's Medications   Discharge Prescriptions    LIDOCAINE (LIDODERM) 5 %    Apply 1 patch topically daily Remove & Discard patch within 12 hours or as directed by MD       Start Date: 6/10/2022 End Date: --       Order Dose: 1 patch       Quantity: 7 patch    Refills: 0           PDMP Review       Value Time User    PDMP Reviewed  Yes 5/23/2022  2:32 PM Bryan Gomez DO          ED Provider  Electronically Signed by           Maryjane Garcia PA-C  06/10/22 2496

## 2022-06-13 ENCOUNTER — TELEPHONE (OUTPATIENT)
Dept: FAMILY MEDICINE CLINIC | Facility: CLINIC | Age: 87
End: 2022-06-13

## 2022-06-13 DIAGNOSIS — M25.512 ACUTE PAIN OF LEFT SHOULDER: Primary | ICD-10-CM

## 2022-06-13 NOTE — TELEPHONE ENCOUNTER
PT was seen in the ER for severe shoulder pain  They are asking if we can please have a referral placed for PT to send over to [facility]  Please advise

## 2022-06-15 ENCOUNTER — OFFICE VISIT (OUTPATIENT)
Dept: FAMILY MEDICINE CLINIC | Facility: CLINIC | Age: 87
End: 2022-06-15
Payer: COMMERCIAL

## 2022-06-15 ENCOUNTER — TELEPHONE (OUTPATIENT)
Dept: FAMILY MEDICINE CLINIC | Facility: CLINIC | Age: 87
End: 2022-06-15

## 2022-06-15 VITALS
RESPIRATION RATE: 15 BRPM | SYSTOLIC BLOOD PRESSURE: 128 MMHG | DIASTOLIC BLOOD PRESSURE: 62 MMHG | BODY MASS INDEX: 21.37 KG/M2 | OXYGEN SATURATION: 99 % | TEMPERATURE: 97.6 F | HEART RATE: 101 BPM | WEIGHT: 101.8 LBS | HEIGHT: 58 IN

## 2022-06-15 DIAGNOSIS — M48.061 SPINAL STENOSIS OF LUMBAR REGION, UNSPECIFIED WHETHER NEUROGENIC CLAUDICATION PRESENT: Primary | ICD-10-CM

## 2022-06-15 DIAGNOSIS — I48.0 PAROXYSMAL ATRIAL FIBRILLATION (HCC): ICD-10-CM

## 2022-06-15 DIAGNOSIS — M25.512 LEFT SHOULDER PAIN: ICD-10-CM

## 2022-06-15 DIAGNOSIS — E03.9 ACQUIRED HYPOTHYROIDISM: ICD-10-CM

## 2022-06-15 DIAGNOSIS — I10 ESSENTIAL HYPERTENSION: ICD-10-CM

## 2022-06-15 DIAGNOSIS — I10 BENIGN ESSENTIAL HTN: ICD-10-CM

## 2022-06-15 DIAGNOSIS — E78.2 MIXED HYPERLIPIDEMIA: ICD-10-CM

## 2022-06-15 PROCEDURE — 99214 OFFICE O/P EST MOD 30 MIN: CPT | Performed by: FAMILY MEDICINE

## 2022-06-15 RX ORDER — ONDANSETRON 4 MG/1
1 TABLET, ORALLY DISINTEGRATING ORAL 2 TIMES DAILY
Qty: 60 PATCH | Refills: 1 | Status: SHIPPED | OUTPATIENT
Start: 2022-06-15 | End: 2022-06-15 | Stop reason: RX

## 2022-06-15 RX ORDER — LIDOCAINE 50 MG/G
1 PATCH TOPICAL DAILY
Qty: 30 PATCH | Refills: 0 | Status: SHIPPED | OUTPATIENT
Start: 2022-06-15

## 2022-06-15 RX ORDER — AMLODIPINE BESYLATE AND BENAZEPRIL HYDROCHLORIDE 5; 20 MG/1; MG/1
1 CAPSULE ORAL DAILY
Qty: 30 CAPSULE | Refills: 11 | Status: SHIPPED | OUTPATIENT
Start: 2022-06-15

## 2022-06-15 RX ORDER — LEVOTHYROXINE SODIUM 0.07 MG/1
TABLET ORAL
Qty: 30 TABLET | Refills: 10 | Status: SHIPPED | OUTPATIENT
Start: 2022-06-15 | End: 2022-06-24

## 2022-06-15 RX ORDER — AMLODIPINE BESYLATE 2.5 MG/1
2.5 TABLET ORAL DAILY
Qty: 30 TABLET | Refills: 5
Start: 2022-06-15

## 2022-06-15 RX ORDER — METOPROLOL SUCCINATE 25 MG/1
12.5 TABLET, EXTENDED RELEASE ORAL DAILY
Qty: 15 TABLET | Refills: 5 | Status: SHIPPED | OUTPATIENT
Start: 2022-06-15 | End: 2022-06-28 | Stop reason: SDUPTHER

## 2022-06-15 NOTE — TELEPHONE ENCOUNTER
As long as we instruct patient that I am going to be switching to a gel that she can rub on her low back which is the same medication that would have been on the patches, I am going to send

## 2022-06-15 NOTE — PROGRESS NOTES
Assessment/Plan:     Diagnoses and all orders for this visit:    Spinal stenosis of lumbar region, unspecified whether neurogenic claudication present  -     Discontinue: Diclofenac Epolamine 1 3 % PTCH; Apply 1 patch topically 2 (two) times a day  -     Ambulatory Referral to Physical Therapy; Future  -     Diclofenac Sodium (VOLTAREN) 1 %; Apply 2 g topically 4 (four) times a day To low back    Left shoulder pain  -     lidocaine (Lidoderm) 5 %; Apply 1 patch topically daily Remove & Discard patch within 12 hours or as directed by MD    Acquired hypothyroidism  -     levothyroxine 75 mcg tablet; 1 TAB BY MOUTH ONCE DAILY AS DIRECTED   -     TSH, 3rd generation; Future  -     T4, free; Future    Essential hypertension  -     amLODIPine-benazepril (LOTREL 5-20) 5-20 MG per capsule; Take 1 capsule by mouth daily  -     Comprehensive metabolic panel; Future    Paroxysmal atrial fibrillation (HCC)  -     metoprolol succinate (TOPROL-XL) 25 mg 24 hr tablet; Take 0 5 tablets (12 5 mg total) by mouth daily    Mixed hyperlipidemia  -     Lipid Panel with Direct LDL reflex; Future    Benign essential HTN  -     amLODIPine (NORVASC) 2 5 mg tablet; Take 1 tablet (2 5 mg total) by mouth daily Can be take in in addition to regular blood pressure meds if BP >160/85        Patient shoulders improving  She is going to continue her Lidoderm patch  Hopefully physical therapy will also be helpful  With regards to low back  Diflucan gel will be child  Also I gave her a lab slip to be completed prior to her visit here in December 2022  Subjective:   Chief Complaint   Patient presents with    Follow-up     Pt states LEFT shoulder pain and lower back pain pt went to emergency room last friday for shoulder pain   Pt has not had COVID vaccine       Patient ID: Janet Pinon is a 80 y o  female  35-year-old female who is here with her daughter for follow-up were shoulder issue  She is currently in an independent apartment  Her  is in the long-term care unit she does get to see him  Patient was seen in the ED for the shoulder issue    Shoulder Pain   This is a new problem  The current episode started 1 to 4 weeks ago  Seen in the ED with the issue  She denies any paresthesias  X-ray of the shoulder showed 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  Also there is some humeral tuberosity small neuropathy  Patient was given lidocaine patch with excellent results  She also states she has chronic low back pain which is controllable  For physical therapy had been ordered through her supervisor at her apartment  She is asking if additional therapy can be done for her low back  Patient denies radicular low back symptoms  She does use walker to assist herself with balance and fall prevention  Patient is also up-to-date Cardiology  She will be changing over to her new cardiologist as Allison Salazar is retiring (she sees him 1 more time June 28  )  The following portions of the patient's history were reviewed and updated as appropriate: allergies, current medications, past family history, past medical history, past social history, past surgical history and problem list   Medication reconciled patient    Review of Systems   HENT: Negative for postnasal drip and rhinorrhea  Respiratory: Negative for cough  Cardiovascular: Negative for chest pain and leg swelling  Musculoskeletal: Positive for arthralgias (left shoulder pain about three weeks)  Neurological: Negative for syncope and headaches  Psychiatric/Behavioral: Negative for sleep disturbance  Objective:    FINDINGS:     There is no acute fracture or dislocation      Mild degenerative glenohumeral and AC joints    Humeral tuberosity small interosseous cysts      No lytic or blastic osseous lesion      Soft tissues are unremarkable      IMPRESSION:     No acute osseous abnormality      /62   Pulse 101   Temp 97 6 °F (36 4 °C) (Temporal)   Resp 15   Ht 4' 10" (1 473 m)   Wt 46 2 kg (101 lb 12 8 oz)   SpO2 99%   BMI 21 28 kg/m²          Physical Exam  Constitutional:       General: She is not in acute distress  Appearance: Normal appearance  She is well-developed and normal weight  Comments: 70-year-old female who appears younger than her stated age ambulatory with walker steady   HENT:      Head: Normocephalic  Right Ear: Tympanic membrane normal       Left Ear: Tympanic membrane normal       Nose: Nose normal       Mouth/Throat:      Mouth: Mucous membranes are moist    Eyes:      Conjunctiva/sclera: Conjunctivae normal       Pupils: Pupils are equal, round, and reactive to light  Cardiovascular:      Rate and Rhythm: Normal rate  Rhythm irregular  Heart sounds: No murmur heard  Pulmonary:      Effort: Pulmonary effort is normal       Breath sounds: Normal breath sounds  Abdominal:      General: Bowel sounds are normal       Palpations: Abdomen is soft  There is no mass  Tenderness: There is no abdominal tenderness  Musculoskeletal:      Cervical back: Normal range of motion and neck supple  Right lower leg: No edema  Left lower leg: No edema  Comments: Left shoulder examination does show some residual restriction especially in external rotation  He is able to fully elevate the arm in forward position  Abduction to about 120°  With regards to the low back, there is no straight leg raising noted  Patient has some mild paraspinal trigger point tenderness  Skin:     General: Skin is warm and dry  Neurological:      Mental Status: She is alert and oriented to person, place, and time  Deep Tendon Reflexes: Reflexes are normal and symmetric  Comments: Delightful   Psychiatric:         Attention and Perception: Attention normal          Mood and Affect: Mood normal          Speech: Speech normal          Behavior: Behavior normal          Thought Content:  Thought content normal          Cognition and Memory: Impaired cognition: Mild short-term

## 2022-06-15 NOTE — TELEPHONE ENCOUNTER
I do believe that both of the orders that we wrote for her shoulder as well as her back would be performed in her home residence?   She will have to ask the supervisor Lilia Ramos

## 2022-06-15 NOTE — TELEPHONE ENCOUNTER
[facility] [employer] called stating that the Diclofenac Epolamine patches are no longer being manufactured  Please send another medication in place of this to [facility] [employer]

## 2022-06-16 NOTE — TELEPHONE ENCOUNTER
Patient called back and was wondering if the gel was sent over to [place]? She would need the prescription for the gel for both her shoulder and back

## 2022-06-20 ENCOUNTER — TELEPHONE (OUTPATIENT)
Dept: FAMILY MEDICINE CLINIC | Facility: CLINIC | Age: 87
End: 2022-06-20

## 2022-06-20 NOTE — TELEPHONE ENCOUNTER
Fabricio Malik called the office she was in this past Wednesday 06/15/22 and was prescribed lidocaine patches  and a rebecca  Pt only got get for her low back she was asking can she get  a gel to use for her shoulder pain or can she use the Voltaren on her shoulder? Pt did have some little posts of blood on her sin form where she applies the lidocaine patch  It is not a lot  It is just a few little drops  Just wanted to make you aware  Pt states" it is no big deal"     Pt's number is [phone]

## 2022-06-20 NOTE — TELEPHONE ENCOUNTER
I called and left the Pt a detailed message making her aware, I advised her to give the office a call back with any questions

## 2022-06-20 NOTE — TELEPHONE ENCOUNTER
I called and spoke with the Pt and made her aware, she verbalized understanding  However she stated the patches are causing blood spots on her skin she stated there is no blood on the patch when she takes it off but is on her nightgown after removal  She would like to stop using them and only use the gel for her lower back and shoulder if that is ok?

## 2022-06-20 NOTE — TELEPHONE ENCOUNTER
Patient was prescribed the Lidoderm patch through the emergency room for her shoulder  She should continue to use that patch on her shoulder  I had wanted to use the flector patch for her low back, but insurance would not cover it so I switched did out to gel  So the gel is for her low back  The Lidoderm patch again is for her shoulder

## 2022-06-23 DIAGNOSIS — E03.9 ACQUIRED HYPOTHYROIDISM: ICD-10-CM

## 2022-06-24 RX ORDER — LEVOTHYROXINE SODIUM 0.07 MG/1
TABLET ORAL
Qty: 30 TABLET | Refills: 5 | Status: SHIPPED | OUTPATIENT
Start: 2022-06-24

## 2022-06-26 PROBLEM — I48.11 LONGSTANDING PERSISTENT ATRIAL FIBRILLATION (HCC): Status: ACTIVE | Noted: 2017-10-27

## 2022-06-28 ENCOUNTER — OFFICE VISIT (OUTPATIENT)
Dept: CARDIOLOGY CLINIC | Facility: CLINIC | Age: 87
End: 2022-06-28
Payer: COMMERCIAL

## 2022-06-28 VITALS
DIASTOLIC BLOOD PRESSURE: 68 MMHG | HEART RATE: 100 BPM | SYSTOLIC BLOOD PRESSURE: 130 MMHG | BODY MASS INDEX: 22.28 KG/M2 | WEIGHT: 106.6 LBS

## 2022-06-28 DIAGNOSIS — M48.061 SPINAL STENOSIS OF LUMBAR REGION, UNSPECIFIED WHETHER NEUROGENIC CLAUDICATION PRESENT: ICD-10-CM

## 2022-06-28 DIAGNOSIS — I10 BENIGN ESSENTIAL HTN: ICD-10-CM

## 2022-06-28 DIAGNOSIS — I49.5 SICK SINUS SYNDROME (HCC): ICD-10-CM

## 2022-06-28 DIAGNOSIS — I48.0 PAROXYSMAL ATRIAL FIBRILLATION (HCC): Primary | ICD-10-CM

## 2022-06-28 DIAGNOSIS — R60.0 LOWER EXTREMITY EDEMA: ICD-10-CM

## 2022-06-28 PROCEDURE — 99214 OFFICE O/P EST MOD 30 MIN: CPT | Performed by: INTERNAL MEDICINE

## 2022-06-28 PROCEDURE — 1036F TOBACCO NON-USER: CPT | Performed by: INTERNAL MEDICINE

## 2022-06-28 PROCEDURE — 1160F RVW MEDS BY RX/DR IN RCRD: CPT | Performed by: INTERNAL MEDICINE

## 2022-06-28 RX ORDER — METOPROLOL SUCCINATE 25 MG/1
25 TABLET, EXTENDED RELEASE ORAL DAILY
Qty: 30 TABLET | Refills: 5 | Status: SHIPPED | OUTPATIENT
Start: 2022-06-28

## 2022-06-28 NOTE — PROGRESS NOTES
Cardiology Follow Up    Johnathon Shine  3/28/1931  [id number]  [address] [facility] [address]  [phone]    Reason for visit:  Six-month follow-up for paroxysmal atrial fibrillation, hypertension, sick sinus syndrome with history of junctional bradycardia and lower extremity    1  Longstanding persistent atrial fibrillation ([address] )     2  Benign essential HTN     3  Sick sinus syndrome ([facility] )     4  Lower extremity edema         Interval History:  Since the patient's last visit she denies chest pain, shortness of breath, palpitations, edema or lightheadedness  Since her last visit she did call with some borderline tachycardic heart rates  That reason put her back very low-dose metoprolol XL 12 5 daily  Patient Active Problem List   Diagnosis    Hypothyroidism    Anxiety    Depression    Mixed hyperlipidemia    Longstanding persistent atrial fibrillation (HCC)    Benign essential HTN    Junctional (jarett) bradycardia    Seasonal allergies    Sick sinus syndrome (HCC)    Spinal stenosis of lumbar region    High serum high density lipoprotein (HDL)    Lower extremity edema     Past Medical History:   Diagnosis Date    A-fib ([facility] )     Anxiety     Depression     Fainting     Hypertension     Sick sinus syndrome ([facility] )      Social History     Socioeconomic History    Marital status: /Civil Union     Spouse name: Not on file    Number of children: 3    Years of education: high school graduate     Highest education level: Not on file   Occupational History    Occupation: hosewife or homemaker    Tobacco Use    Smoking status: Former Smoker    Smokeless tobacco: Never Used   Substance and Sexual Activity    Alcohol use:  Yes    Drug use: No    Sexual activity: Not on file   Other Topics Concern    Not on file   Social History Narrative    Lives with spouse      Social Determinants of Health Financial Resource Strain: Not on file   Food Insecurity: Not on file   Transportation Needs: Not on file   Physical Activity: Not on file   Stress: Not on file   Social Connections: Not on file   Intimate Partner Violence: Not on file   Housing Stability: Not on file      Family History   Problem Relation Age of Onset    Atrial fibrillation Sister     Other Father         cardiac pacemaker     Pancreatic cancer Brother      History reviewed  No pertinent surgical history  Current Outpatient Medications:     ALPRAZolam (XANAX) 0 25 mg tablet, Take 1 tablet (0 25 mg total) by mouth daily at bedtime as needed for anxiety, Disp: 30 tablet, Rfl: 0    amLODIPine (NORVASC) 2 5 mg tablet, Take 1 tablet (2 5 mg total) by mouth daily Can be take in in addition to regular blood pressure meds if BP >160/85, Disp: 30 tablet, Rfl: 5    amLODIPine-benazepril (LOTREL 5-20) 5-20 MG per capsule, Take 1 capsule by mouth daily, Disp: 30 capsule, Rfl: 11    Diclofenac Sodium (VOLTAREN) 1 %, Apply 2 g topically 4 (four) times a day To low back, Disp: 350 g, Rfl: 0    Eliquis 2 5 MG, 1 TAB BY MOUTH TWICE DAILY, Disp: 60 tablet, Rfl: 5    levothyroxine 75 mcg tablet, 1 TAB BY MOUTH ONCE DAILY AS DIRECTED , Disp: 30 tablet, Rfl: 5    metoprolol succinate (TOPROL-XL) 25 mg 24 hr tablet, Take 0 5 tablets (12 5 mg total) by mouth daily, Disp: 15 tablet, Rfl: 5    sertraline (ZOLOFT) 25 mg tablet, 1 TAB BY MOUTH ONCE DAILY W/ BREAKFAST, Disp: 30 tablet, Rfl: 10    lidocaine (Lidoderm) 5 %, Apply 1 patch topically daily Remove & Discard patch within 12 hours or as directed by MD (Patient not taking: Reported on 6/28/2022), Disp: 30 patch, Rfl: 0  Allergies   Allergen Reactions    Hydrochlorothiazide Rash    Losartan Palpitations    Penicillins Rash         Review of Systems:  Review of Systems   Constitutional: Negative for activity change, appetite change, fatigue and unexpected weight change     Respiratory: Negative for cough, shortness of breath and wheezing  Cardiovascular: Negative for chest pain, palpitations and leg swelling  Gastrointestinal: Negative for abdominal pain, blood in stool, constipation and diarrhea  Genitourinary: Positive for frequency  Negative for dysuria, hematuria and urgency  Musculoskeletal: Positive for arthralgias and back pain  Negative for gait problem and joint swelling  Neurological: Negative for dizziness, speech difficulty, light-headedness and headaches  Psychiatric/Behavioral: Negative for agitation, behavioral problems, confusion and decreased concentration  Physical Exam:  Vitals:    06/28/22 1123   BP: 130/68   BP Location: Left arm   Patient Position: Sitting   Cuff Size: Standard   Pulse: 100   Weight: 48 4 kg (106 lb 9 6 oz)       Physical Exam  Constitutional:       Comments: Thin pleasant elderly female in NAD   HENT:      Head: Normocephalic and atraumatic  Mouth/Throat:      Mouth: Mucous membranes are moist       Pharynx: No oropharyngeal exudate or posterior oropharyngeal erythema  Eyes:      General: No scleral icterus  Conjunctiva/sclera: Conjunctivae normal    Neck:      Thyroid: No thyroid mass or thyromegaly  Vascular: Normal carotid pulses  No carotid bruit  Cardiovascular:      Rate and Rhythm: Normal rate  Rhythm irregular  Pulses:           Dorsalis pedis pulses are 1+ on the right side and 1+ on the left side  Posterior tibial pulses are 1+ on the right side and 1+ on the left side  Heart sounds: No murmur heard  No friction rub  No gallop  Pulmonary:      Breath sounds: Normal breath sounds  No wheezing, rhonchi or rales  Abdominal:      Palpations: Abdomen is soft  There is no hepatomegaly, splenomegaly or mass  Tenderness: There is no abdominal tenderness  Musculoskeletal:         General: Swelling (trace to+1 bilaterally in LEs) and deformity (kyphosis) present  Cervical back: Neck supple   No tenderness  Skin:     General: Skin is warm  Coloration: Skin is not jaundiced or pale  Findings: No bruising, erythema, lesion or rash  Neurological:      General: No focal deficit present  Mental Status: She is oriented to person, place, and time  Cranial Nerves: No cranial nerve deficit  Sensory: No sensory deficit  Motor: No weakness  Psychiatric:         Mood and Affect: Mood normal          Behavior: Behavior normal          Thought Content: Thought content normal          Judgment: Judgment normal          Discussion/Summary:  1  Paroxysmal atrial fibrillation  Suspect patient is now in atrial fibrillation more of the time and that is why she does have borderline tachycardia  She is asymptomatic from this  Since there appears to be a low potential to go back in sinus rhythm were she did junctional bradycardia I will increase her metoprolol XL to 25 mg daily from 12 5 mg daily  She will continue Eliquis 2 5 mg b i d  For stroke prevention  2  Hypertension  Well controlled on amlodipine/benazepril 5/20 daily and metoprolol at the above-noted dosage  3  Sick sinus syndrome with history of junctional bradycardia when in sinus rhythm on beta-blockers in the past   If this becomes a problem she would be an excellent candidate for a permanent pacemaker  4  Lower extremity edema  Mild on exam   Likely some element of diastolic heart failure due to relatively rapid rhythm  Will not treat this with diuretic therapy but rather increased rate control medication      Follow-up 6 months with associate (PCP recommended Dr Eugenia Santiago to patient)        Tricia Hurt MD

## 2022-06-30 DIAGNOSIS — M48.061 SPINAL STENOSIS OF LUMBAR REGION, UNSPECIFIED WHETHER NEUROGENIC CLAUDICATION PRESENT: ICD-10-CM

## 2022-07-21 DIAGNOSIS — F32.A DEPRESSION, UNSPECIFIED DEPRESSION TYPE: ICD-10-CM

## 2022-07-21 DIAGNOSIS — F41.9 ANXIETY: ICD-10-CM

## 2022-07-21 RX ORDER — SERTRALINE HYDROCHLORIDE 25 MG/1
TABLET, FILM COATED ORAL
Qty: 30 TABLET | Refills: 4 | Status: SHIPPED | OUTPATIENT
Start: 2022-07-21

## 2022-08-05 ENCOUNTER — TELEPHONE (OUTPATIENT)
Dept: FAMILY MEDICINE CLINIC | Facility: CLINIC | Age: 87
End: 2022-08-05

## 2022-08-05 NOTE — TELEPHONE ENCOUNTER
Patient called stating she is using the Lidocaine Patches that she received for her shoulder and stopped using them on her shoulder is now using on her lower back for muscle pain and it is working  Patient wants to make sure it's okay to use the patches on her back  Please advise patient at [phone]

## 2022-08-16 DIAGNOSIS — M25.512 LEFT SHOULDER PAIN: ICD-10-CM

## 2022-08-16 RX ORDER — LIDOCAINE 50 MG/G
1 PATCH TOPICAL DAILY
Qty: 30 PATCH | Refills: 0 | Status: SHIPPED | OUTPATIENT
Start: 2022-08-16 | End: 2022-09-27

## 2022-08-22 DIAGNOSIS — I48.0 PAROXYSMAL ATRIAL FIBRILLATION (HCC): ICD-10-CM

## 2022-08-23 DIAGNOSIS — I48.0 PAROXYSMAL ATRIAL FIBRILLATION (HCC): ICD-10-CM

## 2022-08-23 RX ORDER — APIXABAN 2.5 MG/1
TABLET, FILM COATED ORAL
Qty: 60 TABLET | Refills: 4 | Status: SHIPPED | OUTPATIENT
Start: 2022-08-23

## 2022-09-24 ENCOUNTER — HOSPITAL ENCOUNTER (EMERGENCY)
Facility: HOSPITAL | Age: 87
Discharge: HOME | End: 2022-09-24
Attending: EMERGENCY MEDICINE
Payer: COMMERCIAL

## 2022-09-24 ENCOUNTER — APPOINTMENT (EMERGENCY)
Dept: CT IMAGING | Facility: HOSPITAL | Age: 87
End: 2022-09-24
Payer: COMMERCIAL

## 2022-09-24 VITALS
HEART RATE: 106 BPM | SYSTOLIC BLOOD PRESSURE: 176 MMHG | TEMPERATURE: 98.4 F | RESPIRATION RATE: 15 BRPM | DIASTOLIC BLOOD PRESSURE: 89 MMHG | OXYGEN SATURATION: 100 %

## 2022-09-24 DIAGNOSIS — R42 LIGHTHEADEDNESS: Primary | ICD-10-CM

## 2022-09-24 DIAGNOSIS — I10 HIGH BLOOD PRESSURE: ICD-10-CM

## 2022-09-24 LAB
ALBUMIN SERPL BCP-MCNC: 4.3 G/DL (ref 3.5–5)
ALP SERPL-CCNC: 75 U/L (ref 46–116)
ALT SERPL W P-5'-P-CCNC: 41 U/L (ref 12–78)
ANION GAP SERPL CALCULATED.3IONS-SCNC: 10 MMOL/L (ref 4–13)
APTT PPP: 34 SECONDS (ref 23–37)
AST SERPL W P-5'-P-CCNC: 37 U/L (ref 5–45)
BACTERIA UR QL AUTO: ABNORMAL /HPF
BASOPHILS # BLD AUTO: 0.02 THOUSANDS/ΜL (ref 0–0.1)
BASOPHILS NFR BLD AUTO: 0 % (ref 0–1)
BILIRUB SERPL-MCNC: 0.44 MG/DL (ref 0.2–1)
BILIRUB UR QL STRIP: NEGATIVE
BUN SERPL-MCNC: 22 MG/DL (ref 5–25)
CALCIUM SERPL-MCNC: 10.4 MG/DL (ref 8.3–10.1)
CARDIAC TROPONIN I PNL SERPL HS: 11 NG/L
CHLORIDE SERPL-SCNC: 97 MMOL/L (ref 96–108)
CLARITY UR: CLEAR
CO2 SERPL-SCNC: 30 MMOL/L (ref 21–32)
COLOR UR: YELLOW
CREAT SERPL-MCNC: 1.01 MG/DL (ref 0.6–1.3)
EOSINOPHIL # BLD AUTO: 0.04 THOUSAND/ΜL (ref 0–0.61)
EOSINOPHIL NFR BLD AUTO: 1 % (ref 0–6)
ERYTHROCYTE [DISTWIDTH] IN BLOOD BY AUTOMATED COUNT: 15.3 % (ref 11.6–15.1)
GFR SERPL CREATININE-BSD FRML MDRD: 48 ML/MIN/1.73SQ M
GLUCOSE SERPL-MCNC: 103 MG/DL (ref 65–140)
GLUCOSE UR STRIP-MCNC: NEGATIVE MG/DL
HCT VFR BLD AUTO: 39.2 % (ref 34.8–46.1)
HGB BLD-MCNC: 12.6 G/DL (ref 11.5–15.4)
HGB UR QL STRIP.AUTO: NEGATIVE
IMM GRANULOCYTES # BLD AUTO: 0.03 THOUSAND/UL (ref 0–0.2)
IMM GRANULOCYTES NFR BLD AUTO: 0 % (ref 0–2)
INR PPP: 1.02 (ref 0.84–1.19)
KETONES UR STRIP-MCNC: NEGATIVE MG/DL
LEUKOCYTE ESTERASE UR QL STRIP: ABNORMAL
LYMPHOCYTES # BLD AUTO: 1.36 THOUSANDS/ΜL (ref 0.6–4.47)
LYMPHOCYTES NFR BLD AUTO: 17 % (ref 14–44)
MCH RBC QN AUTO: 29.1 PG (ref 26.8–34.3)
MCHC RBC AUTO-ENTMCNC: 32.1 G/DL (ref 31.4–37.4)
MCV RBC AUTO: 91 FL (ref 82–98)
MONOCYTES # BLD AUTO: 0.64 THOUSAND/ΜL (ref 0.17–1.22)
MONOCYTES NFR BLD AUTO: 8 % (ref 4–12)
NEUTROPHILS # BLD AUTO: 5.73 THOUSANDS/ΜL (ref 1.85–7.62)
NEUTS SEG NFR BLD AUTO: 74 % (ref 43–75)
NITRITE UR QL STRIP: NEGATIVE
NON-SQ EPI CELLS URNS QL MICRO: ABNORMAL /HPF
NRBC BLD AUTO-RTO: 0 /100 WBCS
NT-PROBNP SERPL-MCNC: 2320 PG/ML
PH UR STRIP.AUTO: 7.5 [PH] (ref 4.5–8)
PLATELET # BLD AUTO: 210 THOUSANDS/UL (ref 149–390)
PMV BLD AUTO: 9.6 FL (ref 8.9–12.7)
POTASSIUM SERPL-SCNC: 4 MMOL/L (ref 3.5–5.3)
PROT SERPL-MCNC: 8.8 G/DL (ref 6.4–8.4)
PROT UR STRIP-MCNC: NEGATIVE MG/DL
PROTHROMBIN TIME: 13.4 SECONDS (ref 11.6–14.5)
RBC # BLD AUTO: 4.33 MILLION/UL (ref 3.81–5.12)
RBC #/AREA URNS AUTO: ABNORMAL /HPF
SODIUM SERPL-SCNC: 137 MMOL/L (ref 135–147)
SP GR UR STRIP.AUTO: 1.01 (ref 1–1.03)
TSH SERPL DL<=0.05 MIU/L-ACNC: 3.25 UIU/ML (ref 0.45–4.5)
UROBILINOGEN UR QL STRIP.AUTO: 0.2 E.U./DL
WBC # BLD AUTO: 7.82 THOUSAND/UL (ref 4.31–10.16)
WBC #/AREA URNS AUTO: ABNORMAL /HPF

## 2022-09-24 PROCEDURE — 84443 ASSAY THYROID STIM HORMONE: CPT

## 2022-09-24 PROCEDURE — 83880 ASSAY OF NATRIURETIC PEPTIDE: CPT

## 2022-09-24 PROCEDURE — 99285 EMERGENCY DEPT VISIT HI MDM: CPT | Performed by: EMERGENCY MEDICINE

## 2022-09-24 PROCEDURE — 80053 COMPREHEN METABOLIC PANEL: CPT

## 2022-09-24 PROCEDURE — 85730 THROMBOPLASTIN TIME PARTIAL: CPT

## 2022-09-24 PROCEDURE — 85025 COMPLETE CBC W/AUTO DIFF WBC: CPT

## 2022-09-24 PROCEDURE — 36415 COLL VENOUS BLD VENIPUNCTURE: CPT

## 2022-09-24 PROCEDURE — 84484 ASSAY OF TROPONIN QUANT: CPT

## 2022-09-24 PROCEDURE — 99284 EMERGENCY DEPT VISIT MOD MDM: CPT

## 2022-09-24 PROCEDURE — 85610 PROTHROMBIN TIME: CPT

## 2022-09-24 PROCEDURE — 93005 ELECTROCARDIOGRAM TRACING: CPT

## 2022-09-24 PROCEDURE — 81001 URINALYSIS AUTO W/SCOPE: CPT

## 2022-09-24 PROCEDURE — G1004 CDSM NDSC: HCPCS

## 2022-09-24 PROCEDURE — 70450 CT HEAD/BRAIN W/O DYE: CPT

## 2022-09-24 NOTE — ED NOTES
Pt ambulated to the bathroom 3 times with steady gait and no dizziness     Lubna Saxena RN  09/24/22 2351

## 2022-09-24 NOTE — ED ATTENDING ATTESTATION
9/24/2022  IShania DO, saw and evaluated the patient  I have discussed the patient with the resident/non-physician practitioner and agree with the resident's/non-physician practitioner's findings, Plan of Care, and MDM as documented in the resident's/non-physician practitioner's note, except where noted  All available labs and Radiology studies were reviewed  I was present for key portions of any procedure(s) performed by the resident/non-physician practitioner and I was immediately available to provide assistance  Patient is a 80-year-old female with a history of AFib on Eliquis, sick sinus syndrome, hypertension, hyperlipidemia, hypothyroidism coming in today with complaints of dizziness  Patient cannot describe it and states that his resolved  She had a mild headache and lasted about an hour  She laid down and took an extra dose of metoprolol  She had no associated chest pain, palpitations, syncope  She had no nausea vomiting diarrhea, diaphoresis or shortness of breath  She states that she has not had it  Family at bedside states that she had no confusion or neurologic changes when they went to see her  She was not ataxic  On my exam patient is resting in bed in no acute distress  EOMI  PERRLA  Patient maintaining airway and secretions  Uvula midline without edema  Irregular rhythm normal rate  Abdomen soft nontender nondistended bowel sounds x4  Patient has full active range of motion of bilateral upper extremities and lower extremities moves independently of each other pain-free  Cranial nerves 2-12 grossly intact  No slurred speech, no facial asymmetry, no tongue deviation    Patient's labs reveal mild elevated proBNP however patient does not appear overloaded or hypoxic  Her vital signs are markedly improved without intervention  Pending CT head    Patient can ambulate without any ataxia will plan for DC home with family     At this point I agree with the current assessment done in the Emergency Department    I have conducted an independent evaluation of this patient a history and physical is as follows:    ED Course         Critical Care Time  Procedures

## 2022-09-24 NOTE — DISCHARGE INSTRUCTIONS
Thank you for coming to the ED for your care  We recommend following up with your family doctor as soon as possible for potential medication adjustments in regards to your blood pressures  Please closely monitor your symptoms at home and return to the ED with any further symptoms like today or new symptoms such as chest pain, severe headache, difficulty walking, or other concerns

## 2022-09-24 NOTE — ED PROVIDER NOTES
History  Chief Complaint   Patient presents with    Dizziness     Pt c/o dizziness started around 11:30 today  Pt also reports high blood pressure today   Pt reports took PRN blood pressure medication with no effect  Pt denies CP      80 y o F w/ h/o afib on eliquis, sick sinus syndrome, HTN, hypothyroidism, HLD, presents due to dizziness  She was at home today around 1130 when she had onset of this sensation  She has difficulty describing what she means by it but denies room spinning, vision changes, gait instability, sensation of passing out  She had a mild frontal headache following the onset of this  This episode lasted for about 1 hour and since then she has had flushed sensation in her cheeks and now describes a lightheaded sensation  She denies n/v, changes in hearing, numbness/weakness, speech difficulty, cp/sob, abd pain, or other subjective symptoms  At this time her only complaints are the flushed cheeks and a mild headache  Her family took her BP following the onset of these symptoms and it was elevated so she took a second BP medication and laid down  Prior to Admission Medications   Prescriptions Last Dose Informant Patient Reported? Taking?    ALPRAZolam (XANAX) 0 25 mg tablet Not Taking at Unknown time  No No   Sig: Take 1 tablet (0 25 mg total) by mouth daily at bedtime as needed for anxiety   Patient not taking: Reported on 2022   Diclofenac Sodium (VOLTAREN) 1 % 2022 at Unknown time  No Yes   Sig: Apply 2 g topically 4 (four) times a day To low back   Eliquis 2 5 MG 2022 at Unknown time  No Yes   Si TAB BY MOUTH TWICE DAILY   amLODIPine (NORVASC) 2 5 mg tablet 2022 at Unknown time  No Yes   Sig: Take 1 tablet (2 5 mg total) by mouth daily Can be take in in addition to regular blood pressure meds if BP >160/85   amLODIPine-benazepril (LOTREL 5-20) 5-20 MG per capsule 2022 at Unknown time  No Yes   Sig: Take 1 capsule by mouth daily   apixaban (Eliquis) 2 5 mg 2022 at Unknown time  No Yes   Sig: Take 1 tablet (2 5 mg total) by mouth 2 (two) times a day   levothyroxine 75 mcg tablet 2022 at Unknown time  No Yes   Si TAB BY MOUTH ONCE DAILY AS DIRECTED    lidocaine (Lidoderm) 5 % Not Taking at Unknown time  No No   Sig: Apply 1 patch topically daily Remove & Discard patch within 12 hours or as directed by MD   Patient not taking: Reported on 2022   metoprolol succinate (TOPROL-XL) 25 mg 24 hr tablet 2022 at Unknown time  No Yes   Sig: Take 1 tablet (25 mg total) by mouth daily   sertraline (ZOLOFT) 25 mg tablet 2022 at Unknown time  No Yes   Si TAB BY MOUTH ONCE DAILY W/ BREAKFAST      Facility-Administered Medications: None       Past Medical History:   Diagnosis Date    [facility]fib ([facility] )     Anxiety     Depression     Fainting     Hypertension     Sick sinus syndrome ([facility] )        History reviewed  No pertinent surgical history  Family History   Problem Relation Age of Onset    Atrial fibrillation Sister     Other Father         cardiac pacemaker     Pancreatic cancer Brother      I have reviewed and agree with the history as documented  E-Cigarette/Vaping     E-Cigarette/Vaping Substances     Social History     Tobacco Use    Smoking status: Former Smoker    Smokeless tobacco: Never Used   Substance Use Topics    Alcohol use:  Yes    Drug use: No        Review of Systems    Physical Exam  ED Triage Vitals   Temperature Pulse Respirations Blood Pressure SpO2   22 1453 22 1448 22 1448 22 1448 22 1448   98 4 °F (36 9 °C) 88 18 (!) 181/99 99 %      Temp Source Heart Rate Source Patient Position - Orthostatic VS BP Location FiO2 (%)   22 1453 22 1448 22 1448 22 1448 --   Oral Monitor Lying Left arm       Pain Score       22 1448       No Pain             Orthostatic Vital Signs  Vitals:    22 1448 22 1611 22 1730   BP: (!) 181/99 (!) 199/118 (!) 176/89 Pulse: 88 92 (!) 106   Patient Position - Orthostatic VS: Lying Lying Lying       Physical Exam    ED Medications  Medications - No data to display    Diagnostic Studies  Results Reviewed     Procedure Component Value Units Date/Time    Comprehensive metabolic panel [[id number]]  (Abnormal) Collected: 09/24/22 1645    Lab Status: Final result Specimen: Blood from Arm, Left Updated: 09/24/22 1729     Sodium 137 mmol/L      Potassium 4 0 mmol/L      Chloride 97 mmol/L      CO2 30 mmol/L      ANION GAP 10 mmol/L      BUN 22 mg/dL      Creatinine 1 01 mg/dL      Glucose 103 mg/dL      Calcium 10 4 mg/dL      AST 37 U/L      ALT 41 U/L      Alkaline Phosphatase 75 U/L      Total Protein 8 8 g/dL      Albumin 4 3 g/dL      Total Bilirubin 0 44 mg/dL      eGFR 48 ml/min/1 73sq m     Narrative:      [place] guidelines for Chronic Kidney Disease (CKD):     Stage 1 with normal or high GFR (GFR > 90 mL/min/1 73 square meters)    Stage 2 Mild CKD (GFR = 60-89 mL/min/1 73 square meters)    Stage 3A Moderate CKD (GFR = 45-59 mL/min/1 73 square meters)    Stage 3B Moderate CKD (GFR = 30-44 mL/min/1 73 square meters)    Stage 4 Severe CKD (GFR = 15-29 mL/min/1 73 square meters)    Stage 5 End Stage CKD (GFR <15 mL/min/1 73 square meters)  Note: GFR calculation is accurate only with a steady state creatinine    TSH, 3rd generation with Free T4 reflex [[id number]]  (Normal) Collected: 09/24/22 1645    Lab Status: Final result Specimen: Blood from Arm, Left Updated: 09/24/22 1729     TSH 3RD GENERATON 3 252 uIU/mL     Narrative:      Patients undergoing fluorescein dye angiography may retain small amounts of fluorescein in the body for 48-72 hours post procedure  Samples containing fluorescein can produce falsely depressed TSH values  If the patient had this procedure,a specimen should be resubmitted post fluorescein clearance        NT-BNP PRO [[id number]]  (Abnormal) Collected: 09/24/22 1645    Lab Status: Final result Specimen: Blood from Arm, Left Updated: 09/24/22 1729     NT-proBNP 2,320 pg/mL     HS Troponin 0hr (reflex protocol) [[id number]]  (Normal) Collected: 09/24/22 1645    Lab Status: Final result Specimen: Blood from Arm, Left Updated: 09/24/22 1716     hs TnI 0hr 11 ng/L     Protime-INR [[id number]]  (Normal) Collected: 09/24/22 1645    Lab Status: Final result Specimen: Blood from Arm, Left Updated: 09/24/22 1704     Protime 13 4 seconds      INR 1 02    APTT [[id number]]  (Normal) Collected: 09/24/22 1645    Lab Status: Final result Specimen: Blood from Arm, Left Updated: 09/24/22 1704     PTT 34 seconds     CBC and differential [[id number]]  (Abnormal) Collected: 09/24/22 1645    Lab Status: Final result Specimen: Blood from Arm, Left Updated: 09/24/22 1654     WBC 7 82 Thousand/uL      RBC 4 33 Million/uL      Hemoglobin 12 6 g/dL      Hematocrit 39 2 %      MCV 91 fL      MCH 29 1 pg      MCHC 32 1 g/dL      RDW 15 3 %      MPV 9 6 fL      Platelets 368 Thousands/uL      nRBC 0 /100 WBCs      Neutrophils Relative 74 %      Immat GRANS % 0 %      Lymphocytes Relative 17 %      Monocytes Relative 8 %      Eosinophils Relative 1 %      Basophils Relative 0 %      Neutrophils Absolute 5 73 Thousands/µL      Immature Grans Absolute 0 03 Thousand/uL      Lymphocytes Absolute 1 36 Thousands/µL      Monocytes Absolute 0 64 Thousand/µL      Eosinophils Absolute 0 04 Thousand/µL      Basophils Absolute 0 02 Thousands/µL     Urine Microscopic [[id number]]  (Abnormal) Collected: 09/24/22 1607    Lab Status: Final result Specimen: Urine, Clean Catch Updated: 09/24/22 1635     RBC, UA None Seen /hpf      WBC, UA 1-2 /hpf      Epithelial Cells Occasional /hpf      Bacteria, UA Occasional /hpf     Urine Macroscopic, POC [[id number]]  (Abnormal) Collected: 09/24/22 1607    Lab Status: Final result Specimen: Urine Updated: 09/24/22 1609     Color, UA Yellow     Clarity, UA Clear     pH, UA 7 5     Leukocytes, UA Trace     Nitrite, UA Negative     Protein, UA Negative mg/dl      Glucose, UA Negative mg/dl      Ketones, UA Negative mg/dl      Urobilinogen, UA 0 2 E U /dl      Bilirubin, UA Negative     Occult Blood, UA Negative     Specific Gravity, UA 1 015    Narrative:      CLIN[facility] RESULT                 CT head wo contrast   Final Result by Jarocho Appiah DO (09/24 1759)      No acute intracranial abnormality  Workstation performed: [id number]               Procedures  ECG 12 Lead Documentation Only    Date/Time: 9/24/2022 6:49 PM  Performed by: Nadiya Lowery MD  Authorized by: Nadiya Lowery MD     Patient location:  ED  Previous ECG:     Previous ECG:  Compared to current    Similarity:  No change  Interpretation:     Interpretation: abnormal    Rate:     ECG rate assessment: normal    Rhythm:     Rhythm: atrial fibrillation    QRS:     QRS axis:  Normal    QRS intervals:  Normal  ST segments:     ST segments:  Normal          ED Course                                       MDM  Number of Diagnoses or Management Options  High blood pressure  Lightheadedness  Diagnosis management comments: 80 y o F w/ transient episode of lightheadedness w/o other associated symptoms to indicate cause  BP was elevated and this may have been the trigger  Could also be ICH due to reported associated headache so will obtain CTH to evaluate  No concern for stroke although it is possible this was a TIA like event  Neurologic exam reassuring  Will obtain labs to screen for potential causes such as thyroid derangement, electrolyte abnormality, anemia, arrhythmia, among others  Discussed workup with family  Patient able to ambulate  Offered admission vs outpatient management  Patient and family would like to pursue outpatient course with close pcp f/u including potential BP medication management  Will return to ED with any further symptoms or concerns         Disposition  Final diagnoses:   Lightheadedness   High blood pressure     Time reflects when diagnosis was documented in both MDM as applicable and the Disposition within this note     Time User Action Codes Description Comment    9/24/2022  6:20 PM Mert Rede Add [R42] Dizziness     9/24/2022  6:20 PM Vin Bernal Remove [R42] Dizziness     9/24/2022  6:21 PM Mert Rede Add [R42] Lightheadedness     9/24/2022  6:21 PM Mert Rede Add [I10] High blood pressure       ED Disposition     ED Disposition   Discharge    Condition   Stable    Date/Time   Sat Sep 24, 2022  6:20 PM    Comment   Maurice Kapadia discharge to home/self care  Follow-up Information     Follow up With Specialties Details Why Contact Info    Chrissy Valdez DO Family Medicine Schedule an appointment as soon as possible for a visit   [address] [place] [address]   [phone]            Discharge Medication List as of 9/24/2022  6:22 PM      CONTINUE these medications which have NOT CHANGED    Details   amLODIPine (NORVASC) 2 5 mg tablet Take 1 tablet (2 5 mg total) by mouth daily Can be take in in addition to regular blood pressure meds if BP >160/85, Starting Wed 6/15/2022, No Print      amLODIPine-benazepril (LOTREL 5-20) 5-20 MG per capsule Take 1 capsule by mouth daily, Starting Wed 6/15/2022, Normal      !! apixaban (Eliquis) 2 5 mg Take 1 tablet (2 5 mg total) by mouth 2 (two) times a day, Starting Tue 8/23/2022, Normal      Diclofenac Sodium (VOLTAREN) 1 % Apply 2 g topically 4 (four) times a day To low back, Starting Wed 6/29/2022, Normal      !!  Eliquis 2 5 MG 1 TAB BY MOUTH TWICE DAILY, Normal      levothyroxine 75 mcg tablet 1 TAB BY MOUTH ONCE DAILY AS DIRECTED , Normal      metoprolol succinate (TOPROL-XL) 25 mg 24 hr tablet Take 1 tablet (25 mg total) by mouth daily, Starting Tue 6/28/2022, Normal      sertraline (ZOLOFT) 25 mg tablet 1 TAB BY MOUTH ONCE DAILY W/ BREAKFAST, Normal      ALPRAZolam (XANAX) 0 25 mg tablet Take 1 tablet (0 25 mg total) by mouth daily at bedtime as needed for anxiety, Starting Wed 5/18/2022, Normal      lidocaine (Lidoderm) 5 % Apply 1 patch topically daily Remove & Discard patch within 12 hours or as directed by MD, Starting Tue 8/16/2022, Normal       !! - Potential duplicate medications found  Please discuss with provider  No discharge procedures on file  PDMP Review       Value Time User    PDMP Reviewed  Yes 5/23/2022  1:64 PM Zach Carey DO           ED Provider  Attending physically available and evaluated Emily Zeng I managed the patient along with the ED Attending      Electronically Signed by         Laureano Vazquez MD  09/24/22 3140

## 2022-09-25 LAB
ATRIAL RATE: 80 BPM
QRS AXIS: 55 DEGREES
QRSD INTERVAL: 84 MS
QT INTERVAL: 348 MS
QTC INTERVAL: 435 MS
T WAVE AXIS: 68 DEGREES
VENTRICULAR RATE: 94 BPM

## 2022-09-25 PROCEDURE — 93010 ELECTROCARDIOGRAM REPORT: CPT | Performed by: INTERNAL MEDICINE

## 2022-09-27 ENCOUNTER — OFFICE VISIT (OUTPATIENT)
Dept: FAMILY MEDICINE CLINIC | Facility: CLINIC | Age: 87
End: 2022-09-27
Payer: COMMERCIAL

## 2022-09-27 VITALS
OXYGEN SATURATION: 98 % | DIASTOLIC BLOOD PRESSURE: 68 MMHG | TEMPERATURE: 97.1 F | SYSTOLIC BLOOD PRESSURE: 122 MMHG | HEART RATE: 79 BPM | BODY MASS INDEX: 21.54 KG/M2 | HEIGHT: 58 IN | WEIGHT: 102.6 LBS | RESPIRATION RATE: 16 BRPM

## 2022-09-27 DIAGNOSIS — I10 BENIGN ESSENTIAL HTN: ICD-10-CM

## 2022-09-27 DIAGNOSIS — Z00.00 MEDICARE ANNUAL WELLNESS VISIT, SUBSEQUENT: Primary | ICD-10-CM

## 2022-09-27 DIAGNOSIS — E03.9 ACQUIRED HYPOTHYROIDISM: ICD-10-CM

## 2022-09-27 PROCEDURE — 1170F FXNL STATUS ASSESSED: CPT | Performed by: FAMILY MEDICINE

## 2022-09-27 PROCEDURE — 3725F SCREEN DEPRESSION PERFORMED: CPT | Performed by: FAMILY MEDICINE

## 2022-09-27 PROCEDURE — 3288F FALL RISK ASSESSMENT DOCD: CPT | Performed by: FAMILY MEDICINE

## 2022-09-27 PROCEDURE — 1160F RVW MEDS BY RX/DR IN RCRD: CPT | Performed by: FAMILY MEDICINE

## 2022-09-27 PROCEDURE — 1090F PRES/ABSN URINE INCON ASSESS: CPT | Performed by: FAMILY MEDICINE

## 2022-09-27 PROCEDURE — 1125F AMNT PAIN NOTED PAIN PRSNT: CPT | Performed by: FAMILY MEDICINE

## 2022-09-27 PROCEDURE — G0439 PPPS, SUBSEQ VISIT: HCPCS | Performed by: FAMILY MEDICINE

## 2022-09-27 NOTE — PROGRESS NOTES
Assessment and Plan:   Maritza Samayoa was seen today for follow-up and medicare wellness visit  Diagnoses and all orders for this visit:    Medicare annual wellness visit, subsequent    Benign essential HTN    Acquired hypothyroidism      Problem List Items Addressed This Visit        Endocrine    Hypothyroidism       Cardiovascular and Mediastinum    Benign essential HTN      Other Visit Diagnoses     Medicare annual wellness visit, subsequent    -  Primary      Patient can still take amlodipine on a p r n  basis is systolic pressures greater than 434 systolic  Gerhardt Sep Patient currently on metoprolol patient is on amlodipine benazepril combination     Preventive health issues were discussed with patient, and age appropriate screening tests were ordered as noted in patient's After Visit Summary  Personalized health advice and appropriate referrals for health education or preventive services given if needed, as noted in patient's After Visit Summary  History of Present Illness:      Chief Complaint   Patient presents with    Follow-up     ER F/U  - Lightheaded / High Bloodpressure  Pt is not Covid vaccinated  Pt declines flu vaccine     Medicare Wellness Visit     Patient presents for a Medicare Wellness Visit    19-year-old female here for Medicare wellness but also follow-up for ED for blood pressure issue  Also her  recently passed away after extended illness   Dizziness   ED VISIT       Pt c/o dizziness started around 11:30 today  Pt also reports high blood pressure today   Pt reports took PRN blood pressure medication with no effect  Pt denies CP       80 y o F w/ h/o afib on eliquis, sick sinus syndrome, HTN, hypothyroidism, HLD, presents due to dizziness  She was at home today around 1130 when she had onset of this sensation  She has difficulty describing what she means by it but denies room spinning, vision changes, gait instability, sensation of passing out   She had a mild frontal headache following the onset of this  This episode lasted for about 1 hour and since then she has had flushed sensation in her cheeks and now describes a lightheaded sensation  She denies n/v, changes in hearing, numbness/weakness, speech difficulty, cp/sob, abd pain, or other subjective symptoms  At this time her only complaints are the flushed cheeks and a mild headache  Her family took her BP following the onset of these symptoms and it was elevated so she took a second BP medication and laid down  Workup with negative troponins  Slight elevated BNP, no chest x-ray performed    Patient Care Team:  Ze Roberts DO as PCP - Papi Buck MD     Review of Systems:     Review of Systems   Constitutional: Fatigue: Fair  Respiratory: Negative for shortness of breath  Cardiovascular: Negative for chest pain  Genitourinary: Negative for dysuria  Musculoskeletal: Positive for arthralgias  Neurological: Positive for dizziness  Problem List:     Patient Active Problem List   Diagnosis    Hypothyroidism    Anxiety    Depression    Mixed hyperlipidemia    Longstanding persistent atrial fibrillation (HCC)    Benign essential HTN    Junctional (jarett) bradycardia    Seasonal allergies    [facility] syndrome [facility] [address] [facility]    Spinal stenosis of lumbar region    High serum high density lipoprotein (HDL)    Lower extremity edema      Past Medical and Surgical History:     Past Medical History:   Diagnosis Date    [facility]     Anxiety     Depression     Fainting     Hypertension     Sick sinus syndrome ([facility] [address] )      History reviewed  No pertinent surgical history  Family History:     Family History   Problem Relation Age of Onset    Atrial fibrillation Sister     Other Father         cardiac pacemaker     Pancreatic cancer Brother       Social History:     Social History     Socioeconomic History    Marital status:       Spouse name: None    Number of children: 4    Years of education: high school graduate     Highest education level: None   Occupational History    Occupation: hosewife or homemaker    Tobacco Use    Smoking status: Former Smoker    Smokeless tobacco: Never Used   Substance and Sexual Activity    Alcohol use: Yes    Drug use: No    Sexual activity: None   Other Topics Concern    None   Social History Narrative    Lives with spouse      Social Determinants of Health     Financial Resource Strain: Low Risk     Difficulty of Paying Living Expenses: Not hard at all   Food Insecurity: Not on file   Transportation Needs: No Transportation Needs    Lack of Transportation (Medical): No    Lack of Transportation (Non-Medical): No   Physical Activity: Not on file   Stress: Not on file   Social Connections: Not on file   Intimate Partner Violence: Not on file   Housing Stability: Not on file      Medications and Allergies:     Current Outpatient Medications   Medication Sig Dispense Refill    amLODIPine (NORVASC) 2 5 mg tablet Take 1 tablet (2 5 mg total) by mouth daily Can be take in in addition to regular blood pressure meds if BP >160/85 30 tablet 5    amLODIPine-benazepril (LOTREL 5-20) 5-20 MG per capsule Take 1 capsule by mouth daily 30 capsule 11    apixaban (Eliquis) 2 5 mg Take 1 tablet (2 5 mg total) by mouth 2 (two) times a day 60 tablet 5    Diclofenac Sodium (VOLTAREN) 1 % Apply 2 g topically 4 (four) times a day To low back 350 g 0    Eliquis 2 5 MG 1 TAB BY MOUTH TWICE DAILY 60 tablet 4    levothyroxine 75 mcg tablet 1 TAB BY MOUTH ONCE DAILY AS DIRECTED   30 tablet 5    metoprolol succinate (TOPROL-XL) 25 mg 24 hr tablet Take 1 tablet (25 mg total) by mouth daily 30 tablet 5    sertraline (ZOLOFT) 25 mg tablet 1 TAB BY MOUTH ONCE DAILY W/ BREAKFAST 30 tablet 4    ALPRAZolam (XANAX) 0 25 mg tablet Take 1 tablet (0 25 mg total) by mouth daily at bedtime as needed for anxiety (Patient not taking: Reported on 9/24/2022) 30 tablet 0     No current facility-administered medications for this visit  Allergies   Allergen Reactions    Hydrochlorothiazide Rash    Losartan Palpitations    Penicillins Rash      Immunizations:     Immunization History   Administered Date(s) Administered    Pneumococcal Conjugate 13-Valent 09/11/2017    Pneumococcal Polysaccharide PPV23 09/11/2006      Health Maintenance: There are no preventive care reminders to display for this patient  Topic Date Due    COVID-19 Vaccine (1) Never done    Influenza Vaccine (1) 09/01/2022      Medicare Screening Tests and Risk Assessments:     Rodriguez Molina is here for her Subsequent Wellness visit  Health Risk Assessment:   Patient rates overall health as very good  Patient feels that their physical health rating is same  Patient is very satisfied with their life  Eyesight was rated as same  Hearing was rated as same  Patient feels that their emotional and mental health rating is slightly worse  Patients states they are never, rarely angry  Patient states they are never, rarely unusually tired/fatigued  Pain experienced in the last 7 days has been none  Patient states that she has experienced no weight loss or gain in last 6 months  Depression Screening:   PHQ-9 Score: 0      Fall Risk Screening: In the past year, patient has experienced: no history of falling in past year      Urinary Incontinence Screening:   Patient has not leaked urine accidently in the last six months  Home Safety:  Patient does not have trouble with stairs inside or outside of their home  Patient has working smoke alarms and has working carbon monoxide detector  Home safety hazards include: none  Nutrition:   Current diet is Regular  Medications:   Patient is currently taking over-the-counter supplements  OTC medications include: see medication list  Patient is able to manage medications       Activities of Daily Living (ADLs)/Instrumental Activities of Daily Living (IADLs):   Walk and transfer into and out of bed and chair?: Yes  Dress and groom yourself?: Yes    Bathe or shower yourself?: Yes    Feed yourself? Yes  Do your laundry/housekeeping?: Yes  Manage your money, pay your bills and track your expenses?: No  Make your own meals?: Yes    Do your own shopping?: No    Previous Hospitalizations:   Any hospitalizations or ED visits within the last 12 months?: Yes    How many hospitalizations have you had in the last year?: 1-2    Advance Care Planning:   Living will: Yes    Durable POA for healthcare: No    Advanced directive: Yes    End of Life Decisions reviewed with patient: Yes      Cognitive Screening:   Provider or family/friend/caregiver concerned regarding cognition?: No    PREVENTIVE SCREENINGS      Cardiovascular Screening:    General: Screening Not Indicated      Diabetes Screening:     General: Screening Current      Colorectal Cancer Screening:     General: Screening Not Indicated      Breast Cancer Screening:     General: Screening Not Indicated      Cervical Cancer Screening:    General: Screening Not Indicated      Osteoporosis Screening:    General: Screening Current      Lung Cancer Screening:     General: Screening Not Indicated    Screening, Brief Intervention, and Referral to Treatment (SBIRT)    Screening  Typical number of drinks in a day: 0  Typical number of drinks in a week: 1  Interpretation: Low risk drinking behavior  Single Item Drug Screening:  How often have you used an illegal drug (including marijuana) or a prescription medication for non-medical reasons in the past year? never    Single Item Drug Screen Score: 0  Interpretation: Negative screen for possible drug use disorder    No exam data present     Physical Exam:     /68   Pulse 79   Temp (!) 97 1 °F (36 2 °C) (Temporal)   Resp 16   Ht 4' 10 27" (1 48 m)   Wt 46 5 kg (102 lb 9 6 oz)   SpO2 98%   BMI 21 25 kg/m²     Physical Exam  Constitutional:       Appearance: Normal appearance        Comments: 22-year-old female who appears younger than her stated age   HENT:      Right Ear: Tympanic membrane normal       Left Ear: Tympanic membrane normal       Nose: Nose normal       Mouth/Throat:      Mouth: Mucous membranes are moist    Cardiovascular:      Rate and Rhythm: Normal rate  Rhythm irregular  Pulmonary:      Effort: Pulmonary effort is normal       Breath sounds: Normal breath sounds  Abdominal:      General: Bowel sounds are normal       Palpations: Abdomen is soft  Musculoskeletal:      Comments: Walks with walker   Neurological:      Mental Status: She is alert and oriented to person, place, and time  Psychiatric:         Mood and Affect: Mood normal          Behavior: Behavior normal          Thought Content:  Thought content normal          Judgment: Judgment normal           Galilea Brdoy DO

## 2022-09-27 NOTE — PATIENT INSTRUCTIONS
Medicare Preventive Visit Patient Instructions  Thank you for completing your Welcome to Medicare Visit or Medicare Annual Wellness Visit today  Your next wellness visit will be due in one year (9/28/2023)  The screening/preventive services that you may require over the next 5-10 years are detailed below  Some tests may not apply to you based off risk factors and/or age  Screening tests ordered at today's visit but not completed yet may show as past due  Also, please note that scanned in results may not display below  Preventive Screenings:  Service Recommendations Previous Testing/Comments   Colorectal Cancer Screening  * Colonoscopy    * Fecal Occult Blood Test (FOBT)/Fecal Immunochemical Test (FIT)  * Fecal DNA/Cologuard Test  * Flexible Sigmoidoscopy Age: 39-70 years old   Colonoscopy: every 10 years (may be performed more frequently if at higher risk)  OR  FOBT/FIT: every 1 year  OR  Cologuard: every 3 years  OR  Sigmoidoscopy: every 5 years  Screening may be recommended earlier than age 39 if at higher risk for colorectal cancer  Also, an individualized decision between you and your healthcare provider will decide whether screening between the ages of 74-80 would be appropriate  Colonoscopy: Not on file  FOBT/FIT: Not on file  Cologuard: Not on file  Sigmoidoscopy: Not on file    Screening Not Indicated     Breast Cancer Screening Age: 36 years old  Frequency: every 1-2 years  Not required if history of left and right mastectomy Mammogram: Not on file        Cervical Cancer Screening Between the ages of 21-29, pap smear recommended once every 3 years  Between the ages of 33-67, can perform pap smear with HPV co-testing every 5 years     Recommendations may differ for women with a history of total hysterectomy, cervical cancer, or abnormal pap smears in past  Pap Smear: Not on file    Screening Not Indicated   Hepatitis C Screening Once for adults born between 1945 and 1965  More frequently in patients at high risk for Hepatitis C Hep C Antibody: Not on file        Diabetes Screening 1-2 times per year if you're at risk for diabetes or have pre-diabetes Fasting glucose: No results in last 5 years (No results in last 5 years)  A1C: No results in last 5 years (No results in last 5 years)  Screening Current   Cholesterol Screening Once every 5 years if you don't have a lipid disorder  May order more often based on risk factors  Lipid panel: Not on file    Screening Not Indicated  History Lipid Disorder     Other Preventive Screenings Covered by Medicare:  1  Abdominal Aortic Aneurysm (AAA) Screening: covered once if your at risk  You're considered to be at risk if you have a family history of AAA  2  Lung Cancer Screening: covers low dose CT scan once per year if you meet all of the following conditions: (1) Age 50-69; (2) No signs or symptoms of lung cancer; (3) Current smoker or have quit smoking within the last 15 years; (4) You have a tobacco smoking history of at least 20 pack years (packs per day multiplied by number of years you smoked); (5) You get a written order from a healthcare provider  3  Glaucoma Screening: covered annually if you're considered high risk: (1) You have diabetes OR (2) Family history of glaucoma OR (3)  aged 48 and older OR (3)  American aged 72 and older  3  Osteoporosis Screening: covered every 2 years if you meet one of the following conditions: (1) You're estrogen deficient and at risk for osteoporosis based off medical history and other findings; (2) Have a vertebral abnormality; (3) On glucocorticoid therapy for more than 3 months; (4) Have primary hyperparathyroidism; (5) On osteoporosis medications and need to assess response to drug therapy  · Last bone density test (DXA Scan): Not on file  5  HIV Screening: covered annually if you're between the age of 12-76   Also covered annually if you are younger than 13 and older than 72 with risk factors for HIV infection  For pregnant patients, it is covered up to 3 times per pregnancy  Immunizations:  Immunization Recommendations   Influenza Vaccine Annual influenza vaccination during flu season is recommended for all persons aged >= 6 months who do not have contraindications   Pneumococcal Vaccine   * Pneumococcal conjugate vaccine = PCV13 (Prevnar 13), PCV15 (Vaxneuvance), PCV20 (Prevnar 20)  * Pneumococcal polysaccharide vaccine = PPSV23 (Pneumovax) Adults 25-60 years old: 1-3 doses may be recommended based on certain risk factors  Adults 72 years old: 1-2 doses may be recommended based off what pneumonia vaccine you previously received   Hepatitis B Vaccine 3 dose series if at intermediate or high risk (ex: diabetes, end stage renal disease, liver disease)   Tetanus (Td) Vaccine - COST NOT COVERED BY MEDICARE PART B Following completion of primary series, a booster dose should be given every 10 years to maintain immunity against tetanus  Td may also be given as tetanus wound prophylaxis  Tdap Vaccine - COST NOT COVERED BY MEDICARE PART B Recommended at least once for all adults  For pregnant patients, recommended with each pregnancy  Shingles Vaccine (Shingrix) - COST NOT COVERED BY MEDICARE PART B  2 shot series recommended in those aged 48 and above     Health Maintenance Due:  There are no preventive care reminders to display for this patient  Immunizations Due:      Topic Date Due    COVID-19 Vaccine (1) Never done    Influenza Vaccine (1) 09/01/2022     Advance Directives   What are advance directives? Advance directives are legal documents that state your wishes and plans for medical care  These plans are made ahead of time in case you lose your ability to make decisions for yourself  Advance directives can apply to any medical decision, such as the treatments you want, and if you want to donate organs  What are the types of advance directives?   There are many types of advance directives, and each state has rules about how to use them  You may choose a combination of any of the following:  · Living will: This is a written record of the treatment you want  You can also choose which treatments you do not want, which to limit, and which to stop at a certain time  This includes surgery, medicine, IV fluid, and tube feedings  · Durable power of  for healthcare [facility]): This is a written record that states who you want to make healthcare choices for you when you are unable to make them for yourself  This person, called a proxy, is usually a family member or a friend  You may choose more than 1 proxy  · Do not resuscitate (DNR) order:  A DNR order is used in case your heart stops beating or you stop breathing  It is a request not to have certain forms of treatment, such as CPR  A DNR order may be included in other types of advance directives  · Medical directive: This covers the care that you want if you are in a coma, near death, or unable to make decisions for yourself  You can list the treatments you want for each condition  Treatment may include pain medicine, surgery, blood transfusions, dialysis, IV or tube feedings, and a ventilator (breathing machine)  · Values history: This document has questions about your views, beliefs, and how you feel and think about life  This information can help others choose the care that you would choose  Why are advance directives important? An advance directive helps you control your care  Although spoken wishes may be used, it is better to have your wishes written down  Spoken wishes can be misunderstood, or not followed  Treatments may be given even if you do not want them  An advance directive may make it easier for your family to make difficult choices about your care  © Copyright [employer] 2018 Information is for End User's use only and may not be sold, redistributed or otherwise used for commercial purposes   All illustrations and images included in [employer] [address] are the copyrighted property of A [employer] M [employer] Inc  or [facility] Atul Alvares

## 2022-10-02 NOTE — ED CARE HANDOFF
Quick note to addend initial with physical exam and ROS    Review of Systems   Constitutional: Negative for chills and fever  HENT: Negative for ear pain and sore throat  Eyes: Negative for pain and visual disturbance  Respiratory: Negative for cough and shortness of breath  Cardiovascular: Negative for chest pain and palpitations  Gastrointestinal: Negative for abdominal pain and vomiting  Genitourinary: Negative for dysuria and hematuria  Musculoskeletal: Negative for arthralgias and back pain  Skin: Negative for color change and rash  Neurological: Positive for dizziness and headaches  Negative for seizures and syncope  All other systems reviewed and are negative  Physical Exam  Vitals and nursing note reviewed  Constitutional:       General: She is not in acute distress  Appearance: She is well-developed  HENT:      Head: Normocephalic and atraumatic  Right Ear: Tympanic membrane and external ear normal       Left Ear: Tympanic membrane and external ear normal       Nose: Nose normal       Mouth/Throat:      Mouth: Mucous membranes are moist    Eyes:      Conjunctiva/sclera: Conjunctivae normal    Cardiovascular:      Rate and Rhythm: Normal rate and regular rhythm  Heart sounds: No murmur heard  Pulmonary:      Effort: Pulmonary effort is normal  No respiratory distress  Breath sounds: Normal breath sounds  Abdominal:      Palpations: Abdomen is soft  Tenderness: There is no abdominal tenderness  Musculoskeletal:      Cervical back: Neck supple  Right lower leg: No edema  Left lower leg: No edema  Skin:     General: Skin is warm and dry  Neurological:      General: No focal deficit present  Mental Status: She is alert  Cranial Nerves: No cranial nerve deficit  Sensory: No sensory deficit  Motor: No weakness        Coordination: Coordination normal       Gait: Gait normal    Psychiatric:         Mood and Affect: Mood normal           Juana Liu MD  10/02/22 [id number]

## 2022-10-11 DIAGNOSIS — I10 BENIGN ESSENTIAL HTN: ICD-10-CM

## 2022-10-11 RX ORDER — AMLODIPINE BESYLATE 2.5 MG/1
2.5 TABLET ORAL DAILY
Qty: 30 TABLET | Refills: 4 | Status: SHIPPED | OUTPATIENT
Start: 2022-10-11

## 2022-12-28 ENCOUNTER — OFFICE VISIT (OUTPATIENT)
Dept: CARDIOLOGY CLINIC | Facility: CLINIC | Age: 87
End: 2022-12-28

## 2022-12-28 VITALS
HEIGHT: 58 IN | BODY MASS INDEX: 19.98 KG/M2 | HEART RATE: 82 BPM | DIASTOLIC BLOOD PRESSURE: 80 MMHG | SYSTOLIC BLOOD PRESSURE: 118 MMHG | WEIGHT: 95.2 LBS

## 2022-12-28 DIAGNOSIS — I48.11 LONGSTANDING PERSISTENT ATRIAL FIBRILLATION (HCC): Primary | ICD-10-CM

## 2022-12-28 DIAGNOSIS — I10 BENIGN ESSENTIAL HTN: ICD-10-CM

## 2022-12-28 NOTE — PROGRESS NOTES
Cardiology             Yolie Mazariegos  3/28/1931  [id number]              Assessment/Plan:    Persistent atrial fibrillation  Hypertension  Sick sinus syndrome with history of junctional bradycardia high-dose metoprolol  Chronic diastolic CHF      Heart rate controlled on auscultation, continue metoprolol succinate 25 mg daily  No episodes of lightheadedness or dizziness  Continue Eliquis anticoagulation  Blood pressure well controlled, continue quetiapine/benazepril        Follow-up in 1 year  Patient encouraged to call if any changes      Interval History: This is a 35-year-old female with persistent atrial fibrillation, hypertension, sick sinus syndrome with history of junctional bradycardia, and chronic diastolic CHF  She has been following with Dr Kenya Napoles in the past     She was last seen 6/2022, at which time she was noted to have atrial fibrillation with borderline tachycardia  She was asymptomatic on this  Metoprolol is increased from 12 5 mg daily to 25 mg daily and Eliquis 2 5 mg twice daily was continued  Amlodipine and benazepril continued for hypertension    She presents today for follow-up with no complaints  She denies chest pain, shortness of breath, dizziness, palpitations, lower extremity edema  Vitals:  Vitals:    12/28/22 1116   BP: 118/80   BP Location: Right arm   Patient Position: Sitting   Cuff Size: Adult   Pulse: 82   Weight: 43 2 kg (95 lb 3 2 oz)   Height: 4' 10 27" (1 48 m)         Past Medical History:   Diagnosis Date   • A-fib [facility])    • Anxiety    • Depression    • Fainting    • Hypertension    • Sick sinus syndrome (HCC)      Social History     Socioeconomic History   • Marital status:       Spouse name: Not on file   • Number of children: 4   • Years of education: high school graduate    • Highest education level: Not on file   Occupational History   • Occupation: hosewife or homemaker    Tobacco Use   • Smoking status: Former   • Smokeless tobacco: Never   Substance and Sexual Activity   • Alcohol use: Yes   • Drug use: No   • Sexual activity: Not on file   Other Topics Concern   • Not on file   Social History Narrative    Lives with spouse      Social Determinants of Health     Financial Resource Strain: Low Risk    • Difficulty of Paying Living Expenses: Not hard at all   Food Insecurity: Not on file   Transportation Needs: No Transportation Needs   • Lack of Transportation (Medical): No   • Lack of Transportation (Non-Medical): No   Physical Activity: Not on file   Stress: Not on file   Social Connections: Not on file   Intimate Partner Violence: Not on file   Housing Stability: Not on file      Family History   Problem Relation Age of Onset   • Atrial fibrillation Sister    • Other Father         cardiac pacemaker    • Pancreatic cancer Brother      History reviewed  No pertinent surgical history      Current Outpatient Medications:   •  amLODIPine-benazepril (LOTREL 5-20) 5-20 MG per capsule, Take 1 capsule by mouth daily, Disp: 30 capsule, Rfl: 11  •  apixaban (Eliquis) 2 5 mg, Take 1 tablet (2 5 mg total) by mouth 2 (two) times a day, Disp: 60 tablet, Rfl: 5  •  Eliquis 2 5 MG, 1 TAB BY MOUTH TWICE DAILY, Disp: 60 tablet, Rfl: 4  •  levothyroxine 75 mcg tablet, 1 TAB BY MOUTH ONCE DAILY AS DIRECTED , Disp: 30 tablet, Rfl: 5  •  metoprolol succinate (TOPROL-XL) 25 mg 24 hr tablet, Take 1 tablet (25 mg total) by mouth daily, Disp: 30 tablet, Rfl: 5  •  sertraline (ZOLOFT) 25 mg tablet, 1 TAB BY MOUTH ONCE DAILY W/ BREAKFAST, Disp: 30 tablet, Rfl: 4  •  ALPRAZolam (XANAX) 0 25 mg tablet, Take 1 tablet (0 25 mg total) by mouth daily at bedtime as needed for anxiety (Patient not taking: Reported on 9/24/2022), Disp: 30 tablet, Rfl: 0  •  amLODIPine (NORVASC) 2 5 mg tablet, TAKE 1 TABLET (2 5 MG TOTAL) BY MOUTH DAILY CAN BE TAKE IN IN ADDITION TO REGULAR BLOOD PRESSURE MEDS IF BP >160/85 (Patient not taking: Reported on 12/28/2022), Disp: 30 tablet, Rfl: 4  •  Diclofenac Sodium (VOLTAREN) 1 %, Apply 2 g topically 4 (four) times a day To low back (Patient not taking: Reported on 12/28/2022), Disp: 350 g, Rfl: 0        Review of Systems:  Review of Systems   Constitutional: Negative for activity change, fever and unexpected weight change  HENT: Negative for facial swelling, nosebleeds and voice change  Respiratory: Negative for chest tightness, shortness of breath and wheezing  Cardiovascular: Negative for chest pain, palpitations and leg swelling  Gastrointestinal: Negative for abdominal distention  Genitourinary: Negative for hematuria  Musculoskeletal: Negative for arthralgias  Skin: Negative for color change, pallor, rash and wound  Neurological: Negative for dizziness, seizures and syncope  Psychiatric/Behavioral: Negative for agitation  Physical Exam:  Physical Exam  Vitals reviewed  Constitutional:       Appearance: She is well-developed  HENT:      Head: Normocephalic and atraumatic  Cardiovascular:      Rate and Rhythm: Normal rate  Rhythm irregular  Heart sounds: Normal heart sounds  Pulmonary:      Effort: Pulmonary effort is normal       Breath sounds: Normal breath sounds  Abdominal:      Palpations: Abdomen is soft  Musculoskeletal:         General: Normal range of motion  Cervical back: Normal range of motion and neck supple  Skin:     General: Skin is warm and dry  Neurological:      Mental Status: She is alert and oriented to person, place, and time  Psychiatric:         Behavior: Behavior normal          Thought Content: Thought content normal          Judgment: Judgment normal          This note was completed in part utilizing M-Modal Fluency Direct Software  Grammatical errors, random word insertions, spelling mistakes, and incomplete sentences can be an occasional consequence of this system secondary to software limitations, ambient noise, and hardware issues    If you have any questions or concerns about the content, text, or information contained within the body of this dictation, please contact the provider for clarification

## 2023-01-05 DIAGNOSIS — I48.0 PAROXYSMAL ATRIAL FIBRILLATION (HCC): ICD-10-CM

## 2023-01-05 RX ORDER — METOPROLOL SUCCINATE 25 MG/1
25 TABLET, EXTENDED RELEASE ORAL DAILY
Qty: 30 TABLET | Refills: 5 | Status: SHIPPED | OUTPATIENT
Start: 2023-01-05

## 2023-01-05 NOTE — TELEPHONE ENCOUNTER
Pt called requesting a refill on metoprolol she stated she has been trying to get in touch with cardiologist's office with no success, pt is wondering if you can please call in for her med   Please advise   Thanks

## 2023-02-15 DIAGNOSIS — M48.061 SPINAL STENOSIS OF LUMBAR REGION, UNSPECIFIED WHETHER NEUROGENIC CLAUDICATION PRESENT: ICD-10-CM

## 2023-03-30 ENCOUNTER — OFFICE VISIT (OUTPATIENT)
Dept: FAMILY MEDICINE CLINIC | Facility: CLINIC | Age: 88
End: 2023-03-30

## 2023-03-30 VITALS
SYSTOLIC BLOOD PRESSURE: 128 MMHG | HEIGHT: 58 IN | DIASTOLIC BLOOD PRESSURE: 68 MMHG | WEIGHT: 99 LBS | HEART RATE: 77 BPM | OXYGEN SATURATION: 99 % | TEMPERATURE: 98.2 F | BODY MASS INDEX: 20.78 KG/M2

## 2023-03-30 DIAGNOSIS — I10 BENIGN ESSENTIAL HTN: Primary | ICD-10-CM

## 2023-03-30 DIAGNOSIS — E78.2 MIXED HYPERLIPIDEMIA: ICD-10-CM

## 2023-03-30 DIAGNOSIS — I48.11 LONGSTANDING PERSISTENT ATRIAL FIBRILLATION (HCC): ICD-10-CM

## 2023-03-30 DIAGNOSIS — E03.9 ACQUIRED HYPOTHYROIDISM: ICD-10-CM

## 2023-03-30 DIAGNOSIS — N18.31 STAGE 3A CHRONIC KIDNEY DISEASE (HCC): ICD-10-CM

## 2023-03-30 NOTE — PROGRESS NOTES
Name: Cj Chappell      : 3/28/1931      MRN: [id number]  Encounter Provider: Nova Fan DO  Encounter Date: 3/30/2023   Encounter department: [facility] PRIMARY CARE    Assessment & Plan     1  Benign essential HTN  -     Comprehensive metabolic panel; Future; Expected date: 2023    2  Mixed hyperlipidemia    3  Acquired hypothyroidism  -     T3, free; Future; Expected date: 2023  -     T4, free; Future; Expected date: 2023  -     TSH, 3rd generation; Future; Expected date: 2023    4  Stage 3a chronic kidney disease (HCC)  -     Comprehensive metabolic panel; Future; Expected date: 2023    5  Longstanding persistent atrial fibrillation ([facility] )           Subjective      Patient is a pleasant 80-year-old female here for follow-up on blood pressure and chronic medical issues  She had some issues with her right knee but she worked it out with homeopathic natural treatments  She is compliant with her medication  Everything was reconciled  Patient is unaccompanied  There is been no inquiries from family members with regards to any medical issues or memory/cognitive     Chief Complaint   Patient presents with   • Follow-up     Here for follow up or chronic medical conditions  Has fluid in her right knee     Patient is scheduled for cardiology in December  She does go once a year  She has longstanding persistent atrial fibrillation  She is on Eliquis  Patient will need updated blood work in September  Review of Systems   Constitutional: Negative for fatigue  Eyes: Negative for visual disturbance  Respiratory: Negative for cough and shortness of breath  Cardiovascular: Negative for chest pain and palpitations     Musculoskeletal:        Recent knee issue which is resolved with homeopathic treatment       Current Outpatient Medications on File Prior to Visit   Medication Sig   • amLODIPine (NORVASC) 2 5 mg tablet TAKE 1 TABLET (2 5 MG TOTAL) BY "MOUTH DAILY CAN BE TAKE IN IN ADDITION TO REGULAR BLOOD PRESSURE MEDS IF BP >160/85   • amLODIPine-benazepril (LOTREL 5-20) 5-20 MG per capsule Take 1 capsule by mouth daily   • apixaban (Eliquis) 2 5 mg Take 1 tablet (2 5 mg total) by mouth 2 (two) times a day   • Eliquis 2 5 MG 1 TAB BY MOUTH TWICE DAILY   • levothyroxine 75 mcg tablet 1 TAB BY MOUTH ONCE DAILY AS DIRECTED  • metoprolol succinate (TOPROL-XL) 25 mg 24 hr tablet Take 1 tablet (25 mg total) by mouth daily       Objective     /68   Pulse 77   Temp 98 2 °F (36 8 °C) (Temporal)   Ht 4' 10 25\" (1 48 m)   Wt 44 9 kg (99 lb)   SpO2 99%   BMI 20 51 kg/m²     Physical Exam  Constitutional:       Appearance: Normal appearance  She is normal weight  Comments: 26-year-old female who appears younger than her stated age   HENT:      Mouth/Throat:      Mouth: Mucous membranes are moist    Eyes:      Pupils: Pupils are equal, round, and reactive to light  Cardiovascular:      Rate and Rhythm: Normal rate  Rhythm irregular  Pulses: Normal pulses  Pulmonary:      Effort: Pulmonary effort is normal       Breath sounds: Normal breath sounds  Abdominal:      General: Bowel sounds are normal       Palpations: Abdomen is soft  Musculoskeletal:      Comments: Ambulation steady with walker   Neurological:      Mental Status: She is alert and oriented to person, place, and time  Psychiatric:         Mood and Affect: Mood normal          Behavior: Behavior normal          Thought Content:  Thought content normal          Judgment: Judgment normal        Carey Lizzy, DO  "

## 2023-04-04 PROBLEM — N18.31 STAGE 3A CHRONIC KIDNEY DISEASE (HCC): Status: ACTIVE | Noted: 2023-04-04

## 2023-04-05 ENCOUNTER — TELEPHONE (OUTPATIENT)
Dept: FAMILY MEDICINE CLINIC | Facility: CLINIC | Age: 88
End: 2023-04-05

## 2023-04-05 NOTE — TELEPHONE ENCOUNTER
----- Message from Roger Gonzalez DO sent at 7/4/0895  5:27 PM EDT -----  Regarding: Labs due in June  I forgot to give patient a lab slip which is due in June  We can mail her the lab slip    She does then contact the nursing supervisor and they have and [facility] come to the patient's apartment for the lab draw again this is due in June

## 2023-06-20 DIAGNOSIS — I10 ESSENTIAL HYPERTENSION: ICD-10-CM

## 2023-06-20 RX ORDER — AMLODIPINE BESYLATE AND BENAZEPRIL HYDROCHLORIDE 5; 20 MG/1; MG/1
CAPSULE ORAL
Qty: 30 CAPSULE | Refills: 4 | Status: SHIPPED | OUTPATIENT
Start: 2023-06-20

## 2023-06-30 DIAGNOSIS — I48.0 PAROXYSMAL ATRIAL FIBRILLATION (HCC): ICD-10-CM

## 2023-06-30 RX ORDER — METOPROLOL SUCCINATE 25 MG/1
TABLET, EXTENDED RELEASE ORAL
Qty: 30 TABLET | Refills: 4 | Status: SHIPPED | OUTPATIENT
Start: 2023-06-30

## 2023-07-17 DIAGNOSIS — I48.0 PAROXYSMAL ATRIAL FIBRILLATION (HCC): ICD-10-CM

## 2023-07-17 DIAGNOSIS — E03.9 ACQUIRED HYPOTHYROIDISM: ICD-10-CM

## 2023-07-18 RX ORDER — APIXABAN 2.5 MG/1
TABLET, FILM COATED ORAL
Qty: 60 TABLET | Refills: 4 | Status: SHIPPED | OUTPATIENT
Start: 2023-07-18

## 2023-07-18 RX ORDER — LEVOTHYROXINE SODIUM 0.07 MG/1
TABLET ORAL
Qty: 30 TABLET | Refills: 2 | Status: SHIPPED | OUTPATIENT
Start: 2023-07-18

## 2023-09-25 ENCOUNTER — OFFICE VISIT (OUTPATIENT)
Dept: FAMILY MEDICINE CLINIC | Facility: CLINIC | Age: 88
End: 2023-09-25
Payer: COMMERCIAL

## 2023-09-25 VITALS
RESPIRATION RATE: 16 BRPM | HEIGHT: 58 IN | TEMPERATURE: 96.9 F | HEART RATE: 69 BPM | BODY MASS INDEX: 21.5 KG/M2 | DIASTOLIC BLOOD PRESSURE: 62 MMHG | OXYGEN SATURATION: 99 % | SYSTOLIC BLOOD PRESSURE: 112 MMHG | WEIGHT: 102.4 LBS

## 2023-09-25 DIAGNOSIS — E03.9 ACQUIRED HYPOTHYROIDISM: ICD-10-CM

## 2023-09-25 DIAGNOSIS — R00.1 JUNCTIONAL (NODAL) BRADYCARDIA: Primary | ICD-10-CM

## 2023-09-25 DIAGNOSIS — I10 ESSENTIAL HYPERTENSION: ICD-10-CM

## 2023-09-25 DIAGNOSIS — I48.0 PAROXYSMAL ATRIAL FIBRILLATION (HCC): ICD-10-CM

## 2023-09-25 PROCEDURE — 99214 OFFICE O/P EST MOD 30 MIN: CPT | Performed by: FAMILY MEDICINE

## 2023-09-25 NOTE — PROGRESS NOTES
Name: Saeid Tracy      : 3/28/1931      MRN: [id number]  Encounter Provider: Kenya Torrez DO  Encounter Date: 2023   Encounter department: [facility] PRIMARY CARE    Assessment & Plan     1. Junctional (jarett) bradycardia  -     Echo complete w/ contrast if indicated; Future; Expected date: 2023    2. Paroxysmal atrial fibrillation (HCC)  -     Echo complete w/ contrast if indicated; Future; Expected date: 2023    3. Acquired hypothyroidism  -     T3, free; Future  -     T4, free; Future  -     TSH, 3rd generation; Future  -     Comprehensive metabolic panel; Future    4. Essential hypertension  -     Comprehensive metabolic panel; Future         The current medical regimen is effective;  continue present plan and medications. Suggest updating echocardiogram.  I have spent a total time of 25 minutesin caring for this patient including Diagnostic results, Instructions for management, Patient and family education, Impressions, Counseling / Coordination of care, Documenting in the medical record, Reviewing / ordering tests, medicine, procedures   and Obtaining or reviewing history  . Subjective     Patient is a pleasant 77-year-old female here for 6-month follow-up. Also reviewing labs from thyroid in . TSH is borderline equal 5.19, T3 was slightly low, free T4 was normal.     Chief Complaint   Patient presents with   • Follow-up     6 month follow up      Media Information      Document Information    Lab Results Ext   CMP, T3 FREE, T4 FREE, TSH,    2023 4:49 PM   Attached To:   EXTERNAL ORDER PANEL [[id number]]   Scanned Document on 23 with Kenya Torrez DO     Source Information    Kenya Torrez DO       Review of Systems   Constitutional:        Living independently at senior apartment   All other systems reviewed and are negative.       Past Medical History:   Diagnosis Date   • A-fib [facility])    • Anxiety    • Depression    • Fainting    • Hypertension    • Sick sinus syndrome ([address] )      History reviewed. No pertinent surgical history. Family History   Problem Relation Age of Onset   • Atrial fibrillation Sister    • Other Father         cardiac pacemaker    • Pancreatic cancer Brother      Social History     Socioeconomic History   • Marital status:      Spouse name: None   • Number of children: 4   • Years of education: high school graduate    • Highest education level: None   Occupational History   • Occupation: hosewife or homemaker    Tobacco Use   • Smoking status: Former   • Smokeless tobacco: Never   Substance and Sexual Activity   • Alcohol use: Yes   • Drug use: No   • Sexual activity: None   Other Topics Concern   • None   Social History Narrative    Lives with spouse      Social Determinants of Health     Financial Resource Strain: Low Risk  (9/27/2022)    Overall Financial Resource Strain (CARDIA)    • Difficulty of Paying Living Expenses: Not hard at all   Food Insecurity: Not on file   Transportation Needs: No Transportation Needs (9/27/2022)    PRAPARE - Transportation    • Lack of Transportation (Medical): No    • Lack of Transportation (Non-Medical): No   Physical Activity: Not on file   Stress: Not on file   Social Connections: Not on file   Intimate Partner Violence: Not on file   Housing Stability: Not on file     Current Outpatient Medications on File Prior to Visit   Medication Sig   • amLODIPine (NORVASC) 2.5 mg tablet TAKE 1 TABLET (2.5 MG TOTAL) BY MOUTH DAILY CAN BE TAKE IN IN ADDITION TO REGULAR BLOOD PRESSURE MEDS IF BP >160/85   • amLODIPine-benazepril (LOTREL 5-20) 5-20 MG per capsule 1 CAP BY MOUTH ONCE DAILY   • Eliquis 2.5 MG TAKE 1 TABLET (2.5 MG TOTAL) BY MOUTH 2 (TWO) TIMES A DAY   • levothyroxine 75 mcg tablet 1 TAB BY MOUTH ONCE DAILY AS DIRECTED.    • metoprolol succinate (TOPROL-XL) 25 mg 24 hr tablet TAKE 1 TABLET BY MOUTH DAILY **SWALLOW WHOLE-DO NOT CHEW OR CRUSH*     Allergies   Allergen Reactions • Hydrochlorothiazide Rash   • Losartan Palpitations   • Penicillins Rash     Immunization History   Administered Date(s) Administered   • Pneumococcal Conjugate 13-Valent 09/11/2017   • Pneumococcal Polysaccharide PPV23 09/11/2006       Objective     /62   Pulse 69   Temp (!) 96.9 °F (36.1 °C) (Temporal)   Resp 16   Ht 4' 10" (1.473 m)   Wt 46.4 kg (102 lb 6.4 oz)   SpO2 99%   BMI 21.40 kg/m²     Physical Exam  Constitutional:       Appearance: Normal appearance. Comments: 55-year-old female who appears younger than her stated age stable ambulation with walker   HENT:      Mouth/Throat:      Mouth: Mucous membranes are moist.   Eyes:      Pupils: Pupils are equal, round, and reactive to light. Cardiovascular:      Rate and Rhythm: Normal rate. Rhythm irregular. Pulmonary:      Effort: Pulmonary effort is normal.   Abdominal:      General: Bowel sounds are normal.      Palpations: Abdomen is soft. Neurological:      Mental Status: She is alert and oriented to person, place, and time. Psychiatric:         Mood and Affect: Mood normal.         Behavior: Behavior normal.         Thought Content:  Thought content normal.         Judgment: Judgment normal.       Fay Hanson,

## 2023-11-28 DIAGNOSIS — I48.0 PAROXYSMAL ATRIAL FIBRILLATION (HCC): ICD-10-CM

## 2023-11-28 DIAGNOSIS — E03.9 ACQUIRED HYPOTHYROIDISM: ICD-10-CM

## 2023-11-29 ENCOUNTER — TELEPHONE (OUTPATIENT)
Dept: FAMILY MEDICINE CLINIC | Facility: CLINIC | Age: 88
End: 2023-11-29

## 2023-11-29 RX ORDER — METOPROLOL SUCCINATE 25 MG/1
TABLET, EXTENDED RELEASE ORAL
Qty: 30 TABLET | Refills: 4 | Status: SHIPPED | OUTPATIENT
Start: 2023-11-29

## 2023-11-29 RX ORDER — LEVOTHYROXINE SODIUM 0.07 MG/1
TABLET ORAL
Qty: 30 TABLET | Refills: 5 | Status: SHIPPED | OUTPATIENT
Start: 2023-11-29

## 2023-11-29 NOTE — TELEPHONE ENCOUNTER
Patient called requesting a script for a walker with 4 wheels, brakes, seat w/ basket. Please fax the script over to [facility] for the patient.     Please advise patient at  [phone]

## 2023-11-30 LAB
DME PARACHUTE DELIVERY DATE ACTUAL: NORMAL
DME PARACHUTE DELIVERY DATE REQUESTED: NORMAL
DME PARACHUTE ITEM DESCRIPTION: NORMAL
DME PARACHUTE ITEM DESCRIPTION: NORMAL
DME PARACHUTE ORDER STATUS: NORMAL
DME PARACHUTE SUPPLIER NAME: NORMAL
DME PARACHUTE SUPPLIER PHONE: NORMAL

## 2023-12-18 ENCOUNTER — OFFICE VISIT (OUTPATIENT)
Dept: CARDIOLOGY CLINIC | Facility: CLINIC | Age: 88
End: 2023-12-18
Payer: COMMERCIAL

## 2023-12-18 VITALS
SYSTOLIC BLOOD PRESSURE: 130 MMHG | HEART RATE: 92 BPM | DIASTOLIC BLOOD PRESSURE: 60 MMHG | HEIGHT: 58 IN | BODY MASS INDEX: 21.58 KG/M2 | WEIGHT: 102.8 LBS

## 2023-12-18 DIAGNOSIS — I48.11 LONGSTANDING PERSISTENT ATRIAL FIBRILLATION (HCC): Primary | ICD-10-CM

## 2023-12-18 DIAGNOSIS — I10 BENIGN ESSENTIAL HTN: ICD-10-CM

## 2023-12-18 PROCEDURE — 99214 OFFICE O/P EST MOD 30 MIN: CPT | Performed by: INTERNAL MEDICINE

## 2023-12-18 PROCEDURE — 93000 ELECTROCARDIOGRAM COMPLETE: CPT | Performed by: INTERNAL MEDICINE

## 2023-12-18 NOTE — PROGRESS NOTES
"      Cardiology             Denise Garcia  3/28/1931  [id number]              Assessment/Plan:    Persistent atrial fibrillation  Hypertension  Sick sinus syndrome with history of junctional bradycardia high-dose metoprolol  Chronic diastolic CHF      HR controlled, continue metoprolol  Tolerating Eliquis, continue the same  BP controlled, continue amlodipine, benazepril        Follow-up in 1 year.  Patient encouraged to call if any changes      Interval History:     This is a 89-year-old female with persistent atrial fibrillation, hypertension, sick sinus syndrome with history of junctional bradycardia, and chronic diastolic CHF.  She has been following with Dr. Tavares in the past.    She was last seen 6/2022, at which time she was noted to have atrial fibrillation with borderline tachycardia.  She was asymptomatic on this.  Metoprolol is increased from 12.5 mg daily to 25 mg daily and Eliquis 2.5 mg twice daily was continued.  Amlodipine and benazepril continued for hypertension    She presents today for follow-up with no complaints.  She denies chest pain, shortness of breath, dizziness, palpitations, lower extremity edema.          Vitals:  Vitals:    12/18/23 1049   BP: 130/60   BP Location: Right arm   Patient Position: Sitting   Cuff Size: Adult   Pulse: 92   Weight: 46.6 kg (102 lb 12.8 oz)   Height: 4' 10\" (1.473 m)         Past Medical History:   Diagnosis Date   • A-fib (HCC)    • Anxiety    • Depression    • Fainting    • Hypertension    • Sick sinus syndrome (HCC)      Social History     Socioeconomic History   • Marital status:      Spouse name: Not on file   • Number of children: 4   • Years of education: high school graduate    • Highest education level: Not on file   Occupational History   • Occupation: hosewife or homemaker    Tobacco Use   • Smoking status: Former   • Smokeless tobacco: Never   Substance and Sexual Activity   • Alcohol use: Yes   • Drug use: No   • Sexual " activity: Not on file   Other Topics Concern   • Not on file   Social History Narrative    Lives with spouse      Social Determinants of Health     Financial Resource Strain: Low Risk  (9/27/2022)    Overall Financial Resource Strain (CARDIA)    • Difficulty of Paying Living Expenses: Not hard at all   Food Insecurity: Not on file   Transportation Needs: No Transportation Needs (9/27/2022)    PRAPARE - Transportation    • Lack of Transportation (Medical): No    • Lack of Transportation (Non-Medical): No   Physical Activity: Not on file   Stress: Not on file   Social Connections: Not on file   Intimate Partner Violence: Not on file   Housing Stability: Not on file      Family History   Problem Relation Age of Onset   • Atrial fibrillation Sister    • Other Father         cardiac pacemaker    • Pancreatic cancer Brother      No past surgical history on file.    Current Outpatient Medications:   •  amLODIPine (NORVASC) 2.5 mg tablet, TAKE 1 TABLET (2.5 MG TOTAL) BY MOUTH DAILY CAN BE TAKE IN IN ADDITION TO REGULAR BLOOD PRESSURE MEDS IF BP >160/85, Disp: 30 tablet, Rfl: 4  •  amLODIPine-benazepril (LOTREL 5-20) 5-20 MG per capsule, 1 CAP BY MOUTH ONCE DAILY, Disp: 30 capsule, Rfl: 4  •  Eliquis 2.5 MG, TAKE 1 TABLET (2.5 MG TOTAL) BY MOUTH 2 (TWO) TIMES A DAY, Disp: 60 tablet, Rfl: 4  •  levothyroxine 75 mcg tablet, 1 TAB BY MOUTH ONCE DAILY AS DIRECTED., Disp: 30 tablet, Rfl: 5  •  metoprolol succinate (TOPROL-XL) 25 mg 24 hr tablet, TAKE 1 TABLET BY MOUTH DAILY **SWALLOW WHOLE-DO NOT CHEW OR CRUSH*, Disp: 30 tablet, Rfl: 4        Review of Systems:  Review of Systems   Constitutional:  Negative for activity change, fever and unexpected weight change.   HENT:  Negative for facial swelling, nosebleeds and voice change.    Respiratory:  Negative for chest tightness, shortness of breath and wheezing.    Cardiovascular:  Negative for chest pain, palpitations and leg swelling.   Gastrointestinal:  Negative for abdominal  distention.   Genitourinary:  Negative for hematuria.   Musculoskeletal:  Negative for arthralgias.   Skin:  Negative for color change, pallor, rash and wound.   Neurological:  Negative for dizziness, seizures, syncope and light-headedness.   Psychiatric/Behavioral:  Negative for agitation.          Physical Exam:  Physical Exam  Vitals reviewed.   Constitutional:       Appearance: She is well-developed.   HENT:      Head: Normocephalic and atraumatic.   Cardiovascular:      Rate and Rhythm: Normal rate. Rhythm irregular.      Heart sounds: Normal heart sounds.   Pulmonary:      Effort: Pulmonary effort is normal.      Breath sounds: Normal breath sounds.   Abdominal:      Palpations: Abdomen is soft.   Musculoskeletal:         General: Normal range of motion.      Cervical back: Normal range of motion and neck supple.      Right lower leg: No edema.      Left lower leg: No edema.   Skin:     General: Skin is warm and dry.   Neurological:      Mental Status: She is alert and oriented to person, place, and time.   Psychiatric:         Behavior: Behavior normal.         Thought Content: Thought content normal.         Judgment: Judgment normal.         This note was completed in part utilizing M-Modal Fluency Direct Software.  Grammatical errors, random word insertions, spelling mistakes, and incomplete sentences can be an occasional consequence of this system secondary to software limitations, ambient noise, and hardware issues.  If you have any questions or concerns about the content, text, or information contained within the body of this dictation, please contact the provider for clarification.

## 2023-12-27 ENCOUNTER — HOSPITAL ENCOUNTER (OUTPATIENT)
Dept: NON INVASIVE DIAGNOSTICS | Facility: HOSPITAL | Age: 88
Discharge: HOME | End: 2023-12-27
Payer: COMMERCIAL

## 2023-12-27 VITALS
DIASTOLIC BLOOD PRESSURE: 60 MMHG | WEIGHT: 102 LBS | BODY MASS INDEX: 21.41 KG/M2 | SYSTOLIC BLOOD PRESSURE: 130 MMHG | HEIGHT: 58 IN | HEART RATE: 70 BPM

## 2023-12-27 DIAGNOSIS — I48.0 PAROXYSMAL ATRIAL FIBRILLATION (HCC): ICD-10-CM

## 2023-12-27 DIAGNOSIS — R00.1 JUNCTIONAL (NODAL) BRADYCARDIA: ICD-10-CM

## 2023-12-27 PROCEDURE — 93306 TTE W/DOPPLER COMPLETE: CPT

## 2023-12-28 LAB
AORTIC ROOT: 1.8 CM
APICAL FOUR CHAMBER EJECTION FRACTION: 57 %
FRACTIONAL SHORTENING: 43 (ref 28–44)
INTERVENTRICULAR SEPTUM IN DIASTOLE (PARASTERNAL SHORT AXIS VIEW): 0.8 CM
INTERVENTRICULAR SEPTUM: 0.8 CM (ref 0.6–1.1)
LAAS-AP2: 14.1 CM2
LAAS-AP4: 16.6 CM2
LEFT ATRIUM AREA SYSTOLE SINGLE PLANE A4C: 15.1 CM2
LEFT ATRIUM SIZE: 3.5 CM
LEFT ATRIUM VOLUME (MOD BIPLANE): 34 ML
LEFT ATRIUM VOLUME INDEX (MOD BIPLANE): 24.8 ML/M2
LEFT INTERNAL DIMENSION IN SYSTOLE: 1.7 CM (ref 2.1–4)
LEFT VENTRICULAR INTERNAL DIMENSION IN DIASTOLE: 3 CM (ref 3.5–6)
LEFT VENTRICULAR POSTERIOR WALL IN END DIASTOLE: 0.8 CM
LEFT VENTRICULAR STROKE VOLUME: 26 ML
LVSV (TEICH): 26 ML
MV E'TISSUE VEL-SEP: 8 CM/S
RIGHT ATRIUM AREA SYSTOLE A4C: 22.9 CM2
RIGHT VENTRICLE ID DIMENSION: 4.2 CM
SL CV LEFT ATRIUM LENGTH A2C: 5.5 CM
SL CV LV EF: 63
SL CV PED ECHO LEFT VENTRICLE DIASTOLIC VOLUME (MOD BIPLANE) 2D: 35 ML
SL CV PED ECHO LEFT VENTRICLE SYSTOLIC VOLUME (MOD BIPLANE) 2D: 9 ML
TR MAX PG: 22 MMHG
TR PEAK VELOCITY: 2.4 M/S
TRICUSPID ANNULAR PLANE SYSTOLIC EXCURSION: 1.6 CM
TRICUSPID VALVE PEAK REGURGITATION VELOCITY: 2.36 M/S

## 2023-12-28 PROCEDURE — 93306 TTE W/DOPPLER COMPLETE: CPT | Performed by: INTERNAL MEDICINE

## 2023-12-28 NOTE — RESULT ENCOUNTER NOTE
"I know that you recently saw patient for cardiac update.  I had ordered an updated echocardiogram which was done after you had seen her on the 18th.  Looks like some progression of tricuspid regurgitation, patient appears asymptomatic      \"All compared to report from October 16, 2017, there is now severe tricuspid regurgitation.\"    "

## 2024-03-08 ENCOUNTER — TELEPHONE (OUTPATIENT)
Age: 89
End: 2024-03-08

## 2024-03-08 NOTE — TELEPHONE ENCOUNTER
"Patient requesting medication for her swollen ankle x 2 weeks.  Patient declined to schedule office visit, stated \"everyone has COVID at the facility she is in and did not want to go anywhere\".    Pharmacy- [facility]    Please review and advice  Thank you  "

## 2024-03-24 ENCOUNTER — RA CDI HCC (OUTPATIENT)
Dept: OTHER | Facility: HOSPITAL | Age: 89
End: 2024-03-24

## 2024-04-01 ENCOUNTER — OFFICE VISIT (OUTPATIENT)
Dept: FAMILY MEDICINE CLINIC | Facility: CLINIC | Age: 89
End: 2024-04-01
Payer: COMMERCIAL

## 2024-04-01 VITALS
WEIGHT: 108 LBS | DIASTOLIC BLOOD PRESSURE: 62 MMHG | HEIGHT: 57 IN | HEART RATE: 89 BPM | SYSTOLIC BLOOD PRESSURE: 122 MMHG | BODY MASS INDEX: 23.3 KG/M2 | RESPIRATION RATE: 16 BRPM | TEMPERATURE: 97.6 F | OXYGEN SATURATION: 97 %

## 2024-04-01 DIAGNOSIS — R60.0 LOWER EXTREMITY EDEMA: ICD-10-CM

## 2024-04-01 DIAGNOSIS — Z00.00 MEDICARE ANNUAL WELLNESS VISIT, SUBSEQUENT: Primary | ICD-10-CM

## 2024-04-01 DIAGNOSIS — I48.11 LONGSTANDING PERSISTENT ATRIAL FIBRILLATION (HCC): ICD-10-CM

## 2024-04-01 DIAGNOSIS — N18.31 STAGE 3A CHRONIC KIDNEY DISEASE (HCC): ICD-10-CM

## 2024-04-01 PROCEDURE — G0439 PPPS, SUBSEQ VISIT: HCPCS | Performed by: FAMILY MEDICINE

## 2024-04-01 RX ORDER — FUROSEMIDE 20 MG/1
10 TABLET ORAL DAILY
Qty: 30 TABLET | Refills: 0 | Status: SHIPPED | OUTPATIENT
Start: 2024-04-01

## 2024-04-01 NOTE — PROGRESS NOTES
Assessment and Plan:   Denise was seen today for medicare wellness visit.    Diagnoses and all orders for this visit:    Medicare annual wellness visit, subsequent    Lower extremity edema  -     furosemide (LASIX) 20 mg tablet; Take 0.5 tablets (10 mg total) by mouth daily Prn for ankle swellin    Longstanding persistent atrial fibrillation (HCC)  Comments:  Recheck cardiology December 2024 Dr. Ball    Stage 3a chronic kidney disease (HCC)  Comments:  Stable       Problem List Items Addressed This Visit        Cardiovascular and Mediastinum    Longstanding persistent atrial fibrillation (HCC)       Genitourinary    Stage 3a chronic kidney disease (HCC)    Relevant Medications    furosemide (LASIX) 20 mg tablet       Surgery/Wound/Pain    Lower extremity edema    Relevant Medications    furosemide (LASIX) 20 mg tablet   Other Visit Diagnoses     Medicare annual wellness visit, subsequent    -  Primary          Depression Screening and Follow-up Plan: Patient was screened for depression during today's encounter. They screened negative with a PHQ-9 score of 0.    Urinary Incontinence Plan of Care: counseling topics discussed: practice Kegel (pelvic floor strengthening) exercises, use restroom every 2 hours and preventing constipation. Patient denies issues..       Preventive health issues were discussed with patient, and age appropriate screening tests were ordered as noted in patient's After Visit Summary.  Personalized health advice and appropriate referrals for health education or preventive services given if needed, as noted in patient's After Visit Summary.     History of Present Illness:     Patient presents for a Medicare Wellness Visit    Patient is a 89 female here for Medicare wellness and follow-up.    Last lab work in January     Media Information      Document Information    Lab Results Ext   HNL T4, T3, CMP,   01/04/2024 12:59 PM   Attached To:   EXTERNAL ORDER PANEL [[id number]]   Scanned Document on  1/4/24 with Rosie Vasquez DO   Source Information    Rosie Vasquez DO     Chief Complaint   Patient presents with   • Medicare Wellness Visit      Patient Care Team:  Rsoie Vasquez DO as PCP - General  Deonte Tavares MD  Patient overdue to see cardiology.  Echocardiogram was ordered by myself and done in December ejection fraction and left ventricular function normal.  Left and right atrial enlargement.  Mild mitral regurgitation, severe tricuspid regurgitation  Compared to report from October 16, 2017, there is now severe tricuspid regurgitation.          Review of Systems:     Review of Systems   Cardiovascular:  Positive for leg swelling (ankle).   Genitourinary:         Gets up to pee   Musculoskeletal:  Positive for back pain (started aleve and homeopathic). Negative for arthralgias and myalgias.   Psychiatric/Behavioral: Negative.          Problem List:     Patient Active Problem List   Diagnosis   • Hypothyroidism   • Anxiety   • Depression   • Mixed hyperlipidemia   • Longstanding persistent atrial fibrillation (HCC)   • Benign essential HTN   • Junctional (jarett) bradycardia   • Seasonal allergies   • Sick sinus syndrome (HCC)   • Spinal stenosis of lumbar region   • High serum high density lipoprotein (HDL)   • Lower extremity edema   • Stage 3a chronic kidney disease (HCC)      Past Medical and Surgical History:     Past Medical History:   Diagnosis Date   • A-fib (HCC)    • Anxiety    • Depression    • Fainting    • Hypertension    • Sick sinus syndrome (HCC)      History reviewed. No pertinent surgical history.   Family History:     Family History   Problem Relation Age of Onset   • Atrial fibrillation Sister    • Other Father         cardiac pacemaker    • Pancreatic cancer Brother       Social History:     Social History     Socioeconomic History   • Marital status:      Spouse name: None   • Number of children: 4   • Years of education: high school graduate    • Highest education  level: None   Occupational History   • Occupation: hosewife or homemaker    Tobacco Use   • Smoking status: Former   • Smokeless tobacco: Never   Vaping Use   • Vaping status: Never Used   Substance and Sexual Activity   • Alcohol use: Yes   • Drug use: No   • Sexual activity: None   Other Topics Concern   • None   Social History Narrative    Lives with spouse      Social Determinants of Health     Financial Resource Strain: Low Risk  (9/27/2022)    Overall Financial Resource Strain (CARDIA)    • Difficulty of Paying Living Expenses: Not hard at all   Food Insecurity: No Food Insecurity (4/1/2024)    Hunger Vital Sign    • Worried About Running Out of Food in the Last Year: Never true    • Ran Out of Food in the Last Year: Never true   Transportation Needs: No Transportation Needs (4/1/2024)    PRAPARE - Transportation    • Lack of Transportation (Medical): No    • Lack of Transportation (Non-Medical): No   Physical Activity: Not on file   Stress: Not on file   Social Connections: Not on file   Intimate Partner Violence: Not on file   Housing Stability: Low Risk  (4/1/2024)    Housing Stability Vital Sign    • Unable to Pay for Housing in the Last Year: No    • Number of Places Lived in the Last Year: 1    • Unstable Housing in the Last Year: No      Medications and Allergies:     Current Outpatient Medications   Medication Sig Dispense Refill   • amLODIPine (NORVASC) 2.5 mg tablet TAKE 1 TABLET (2.5 MG TOTAL) BY MOUTH DAILY CAN BE TAKE IN IN ADDITION TO REGULAR BLOOD PRESSURE MEDS IF BP >160/85 30 tablet 4   • amLODIPine-benazepril (LOTREL 5-20) 5-20 MG per capsule 1 CAP BY MOUTH ONCE DAILY 30 capsule 4   • Eliquis 2.5 MG TAKE 1 TABLET (2.5 MG TOTAL) BY MOUTH 2 (TWO) TIMES A DAY 60 tablet 4   • furosemide (LASIX) 20 mg tablet Take 0.5 tablets (10 mg total) by mouth daily Prn for ankle swellin 30 tablet 0   • levothyroxine 75 mcg tablet 1 TAB BY MOUTH ONCE DAILY AS DIRECTED. 30 tablet 5   • metoprolol succinate  (TOPROL-XL) 25 mg 24 hr tablet TAKE 1 TABLET BY MOUTH DAILY **SWALLOW WHOLE-DO NOT CHEW OR CRUSH* 30 tablet 4     No current facility-administered medications for this visit.     Allergies   Allergen Reactions   • Hydrochlorothiazide Rash   • Losartan Palpitations   • Penicillins Rash      Immunizations:     Immunization History   Administered Date(s) Administered   • Pneumococcal Conjugate 13-Valent 09/11/2017   • Pneumococcal Polysaccharide PPV23 09/11/2006      Health Maintenance:     There are no preventive care reminders to display for this patient.      Topic Date Due   • Influenza Vaccine (1) Never done   • COVID-19 Vaccine (1 - 2023-24 season) Never done      Medicare Screening Tests and Risk Assessments:     Denise is here for her Subsequent Wellness visit.     Health Risk Assessment:   Patient rates overall health as very good. Patient feels that their physical health rating is same. Patient is very satisfied with their life. Eyesight was rated as same. Hearing was rated as same. Patient feels that their emotional and mental health rating is same. Patients states they are never, rarely angry. Patient states they are never, rarely unusually tired/fatigued. Pain experienced in the last 7 days has been some. Patient's pain rating has been 2/10. Patient states that she has experienced no weight loss or gain in last 6 months.     Depression Screening:   PHQ-9 Score: 0      Fall Risk Screening:   In the past year, patient has experienced: no history of falling in past year      Urinary Incontinence Screening:   Patient has leaked urine accidently in the last six months.     Home Safety:  Patient does not have trouble with stairs inside or outside of their home. Patient has working smoke alarms and has working carbon monoxide detector. Home safety hazards include: none.     Nutrition:   Current diet is Regular.     Medications:   Patient is not currently taking any over-the-counter supplements. Patient is able to  manage medications.     Activities of Daily Living (ADLs)/Instrumental Activities of Daily Living (IADLs):   Walk and transfer into and out of bed and chair?: Yes  Dress and groom yourself?: Yes    Bathe or shower yourself?: Yes    Feed yourself? Yes  Do your laundry/housekeeping?: No  Manage your money, pay your bills and track your expenses?: No  Make your own meals?: No    Do your own shopping?: No    Previous Hospitalizations:   Any hospitalizations or ED visits within the last 12 months?: No      Advance Care Planning:   Living will: Yes    Durable POA for healthcare: Yes    Advanced directive: Yes    End of Life Decisions reviewed with patient: Yes    Provider agrees with end of life decisions: Yes      Cognitive Screening:   Provider or family/friend/caregiver concerned regarding cognition?: No    PREVENTIVE SCREENINGS      Cardiovascular Screening:    General: Screening Not Indicated and History Lipid Disorder      Diabetes Screening:     General: Screening Not Indicated      Colorectal Cancer Screening:     General: Screening Not Indicated      Breast Cancer Screening:     General: Screening Not Indicated      Cervical Cancer Screening:    General: Screening Not Indicated      Osteoporosis Screening:    General: Screening Not Indicated      Abdominal Aortic Aneurysm (AAA) Screening:        General: Screening Not Indicated      Lung Cancer Screening:     General: Screening Not Indicated    Screening, Brief Intervention, and Referral to Treatment (SBIRT)    Screening  Typical number of drinks in a day: 0  Typical number of drinks in a week: 0  Interpretation: Low risk drinking behavior.    Single Item Drug Screening:  How often have you used an illegal drug (including marijuana) or a prescription medication for non-medical reasons in the past year? never    Single Item Drug Screen Score: 0  Interpretation: Negative screen for possible drug use disorder    No results found.     Physical Exam:     /62    "Pulse 89   Temp 97.6 °F (36.4 °C) (Temporal)   Resp 16   Ht 4' 9.48\" (1.46 m)   Wt 49 kg (108 lb)   SpO2 97%   BMI 22.98 kg/m²     Physical Exam  Constitutional:       Appearance: Normal appearance. She is normal weight.      Comments: 89-year-old female appears younger than stated age   HENT:      Mouth/Throat:      Mouth: Mucous membranes are moist.   Neck:      Vascular: No carotid bruit.   Cardiovascular:      Rate and Rhythm: Normal rate. Rhythm irregular.      Pulses: Normal pulses.   Pulmonary:      Effort: Pulmonary effort is normal.      Breath sounds: Normal breath sounds.   Abdominal:      General: Bowel sounds are normal.      Palpations: Abdomen is soft. There is no mass.   Musculoskeletal:      Right lower leg: No edema.      Left lower leg: No edema.   Skin:     Findings: No rash.   Neurological:      Mental Status: She is alert and oriented to person, place, and time.   Psychiatric:         Mood and Affect: Mood normal.         Behavior: Behavior normal.         Thought Content: Thought content normal.         Judgment: Judgment normal.          Rosie Vasquez, DO  "

## 2024-04-01 NOTE — PATIENT INSTRUCTIONS
Medicare Preventive Visit Patient Instructions  Thank you for completing your Welcome to Medicare Visit or Medicare Annual Wellness Visit today. Your next wellness visit will be due in one year (4/2/2025).  The screening/preventive services that you may require over the next 5-10 years are detailed below. Some tests may not apply to you based off risk factors and/or age. Screening tests ordered at today's visit but not completed yet may show as past due. Also, please note that scanned in results may not display below.  Preventive Screenings:  Service Recommendations Previous Testing/Comments   Colorectal Cancer Screening  * Colonoscopy    * Fecal Occult Blood Test (FOBT)/Fecal Immunochemical Test (FIT)  * Fecal DNA/Cologuard Test  * Flexible Sigmoidoscopy Age: 45-75 years old   Colonoscopy: every 10 years (may be performed more frequently if at higher risk)  OR  FOBT/FIT: every 1 year  OR  Cologuard: every 3 years  OR  Sigmoidoscopy: every 5 years  Screening may be recommended earlier than age 45 if at higher risk for colorectal cancer. Also, an individualized decision between you and your healthcare provider will decide whether screening between the ages of 76-85 would be appropriate. Colonoscopy: Not on file  FOBT/FIT: Not on file  Cologuard: Not on file  Sigmoidoscopy: Not on file    Screening Not Indicated     Breast Cancer Screening Age: 40+ years old  Frequency: every 1-2 years  Not required if history of left and right mastectomy Mammogram: Not on file        Cervical Cancer Screening Between the ages of 21-29, pap smear recommended once every 3 years.   Between the ages of 30-65, can perform pap smear with HPV co-testing every 5 years.   Recommendations may differ for women with a history of total hysterectomy, cervical cancer, or abnormal pap smears in past. Pap Smear: Not on file    Screening Not Indicated   Hepatitis C Screening Once for adults born between 1945 and 1965  More frequently in patients at  high risk for Hepatitis C Hep C Antibody: Not on file        Diabetes Screening 1-2 times per year if you're at risk for diabetes or have pre-diabetes Fasting glucose: No results in last 5 years (No results in last 5 years)  A1C: No results in last 5 years (No results in last 5 years)      Cholesterol Screening Once every 5 years if you don't have a lipid disorder. May order more often based on risk factors. Lipid panel: Not on file    Screening Not Indicated  History Lipid Disorder     Other Preventive Screenings Covered by Medicare:  Abdominal Aortic Aneurysm (AAA) Screening: covered once if your at risk. You're considered to be at risk if you have a family history of AAA.  Lung Cancer Screening: covers low dose CT scan once per year if you meet all of the following conditions: (1) Age 55-77; (2) No signs or symptoms of lung cancer; (3) Current smoker or have quit smoking within the last 15 years; (4) You have a tobacco smoking history of at least 20 pack years (packs per day multiplied by number of years you smoked); (5) You get a written order from a healthcare provider.  Glaucoma Screening: covered annually if you're considered high risk: (1) You have diabetes OR (2) Family history of glaucoma OR (3)  aged 50 and older OR (4)  American aged 65 and older  Osteoporosis Screening: covered every 2 years if you meet one of the following conditions: (1) You're estrogen deficient and at risk for osteoporosis based off medical history and other findings; (2) Have a vertebral abnormality; (3) On glucocorticoid therapy for more than 3 months; (4) Have primary hyperparathyroidism; (5) On osteoporosis medications and need to assess response to drug therapy.   Last bone density test (DXA Scan): Not on file.  HIV Screening: covered annually if you're between the age of 15-65. Also covered annually if you are younger than 15 and older than 65 with risk factors for HIV infection. For pregnant patients,  it is covered up to 3 times per pregnancy.    Immunizations:  Immunization Recommendations   Influenza Vaccine Annual influenza vaccination during flu season is recommended for all persons aged >= 6 months who do not have contraindications   Pneumococcal Vaccine   * Pneumococcal conjugate vaccine = PCV13 (Prevnar 13), PCV15 (Vaxneuvance), PCV20 (Prevnar 20)  * Pneumococcal polysaccharide vaccine = PPSV23 (Pneumovax) Adults 19-63 yo with certain risk factors or if 65+ yo  If never received any pneumonia vaccine: recommend Prevnar 20 (PCV20)  Give PCV20 if previously received 1 dose of PCV13 or PPSV23   Hepatitis B Vaccine 3 dose series if at intermediate or high risk (ex: diabetes, end stage renal disease, liver disease)   Respiratory syncytial virus (RSV) Vaccine - COVERED BY MEDICARE PART D  * RSVPreF3 (Arexvy) CDC recommends that adults 60 years of age and older may receive a single dose of RSV vaccine using shared clinical decision-making (SCDM)   Tetanus (Td) Vaccine - COST NOT COVERED BY MEDICARE PART B Following completion of primary series, a booster dose should be given every 10 years to maintain immunity against tetanus. Td may also be given as tetanus wound prophylaxis.   Tdap Vaccine - COST NOT COVERED BY MEDICARE PART B Recommended at least once for all adults. For pregnant patients, recommended with each pregnancy.   Shingles Vaccine (Shingrix) - COST NOT COVERED BY MEDICARE PART B  2 shot series recommended in those 19 years and older who have or will have weakened immune systems or those 50 years and older     Health Maintenance Due:  There are no preventive care reminders to display for this patient.  Immunizations Due:      Topic Date Due   • Influenza Vaccine (1) Never done   • COVID-19 Vaccine (1 - 2023-24 season) Never done     Advance Directives   What are advance directives?  Advance directives are legal documents that state your wishes and plans for medical care. These plans are made ahead of  time in case you lose your ability to make decisions for yourself. Advance directives can apply to any medical decision, such as the treatments you want, and if you want to donate organs.   What are the types of advance directives?  There are many types of advance directives, and each state has rules about how to use them. You may choose a combination of any of the following:  Living will:  This is a written record of the treatment you want. You can also choose which treatments you do not want, which to limit, and which to stop at a certain time. This includes surgery, medicine, IV fluid, and tube feedings.   Durable power of  for healthcare (DPAHC):  This is a written record that states who you want to make healthcare choices for you when you are unable to make them for yourself. This person, called a proxy, is usually a family member or a friend. You may choose more than 1 proxy.  Do not resuscitate (DNR) order:  A DNR order is used in case your heart stops beating or you stop breathing. It is a request not to have certain forms of treatment, such as CPR. A DNR order may be included in other types of advance directives.  Medical directive:  This covers the care that you want if you are in a coma, near death, or unable to make decisions for yourself. You can list the treatments you want for each condition. Treatment may include pain medicine, surgery, blood transfusions, dialysis, IV or tube feedings, and a ventilator (breathing machine).  Values history:  This document has questions about your views, beliefs, and how you feel and think about life. This information can help others choose the care that you would choose.  Why are advance directives important?  An advance directive helps you control your care. Although spoken wishes may be used, it is better to have your wishes written down. Spoken wishes can be misunderstood, or not followed. Treatments may be given even if you do not want them. An advance  directive may make it easier for your family to make difficult choices about your care.   Urinary Incontinence   Urinary incontinence (UI)  is when you lose control of your bladder. UI develops because your bladder cannot store or empty urine properly. The 3 most common types of UI are stress incontinence, urge incontinence, or both.  Medicines:   May be given to help strengthen your bladder control. Report any side effects of medication to your healthcare provider.  Do pelvic muscle exercises often:  Your pelvic muscles help you stop urinating. Squeeze these muscles tight for 5 seconds, then relax for 5 seconds. Gradually work up to squeezing for 10 seconds. Do 3 sets of 15 repetitions a day, or as directed. This will help strengthen your pelvic muscles and improve bladder control.  Train your bladder:  Go to the bathroom at set times, such as every 2 hours, even if you do not feel the urge to go. You can also try to hold your urine when you feel the urge to go. For example, hold your urine for 5 minutes when you feel the urge to go. As that becomes easier, hold your urine for 10 minutes.   Self-care:   Keep a UI record.  Write down how often you leak urine and how much you leak. Make a note of what you were doing when you leaked urine.  Drink liquids as directed. You may need to limit the amount of liquid you drink to help control your urine leakage. Do not drink any liquid right before you go to bed. Limit or do not have drinks that contain caffeine or alcohol.   Prevent constipation.  Eat a variety of high-fiber foods. Good examples are high-fiber cereals, beans, vegetables, and whole-grain breads. Walking is the best way to trigger your intestines to have a bowel movement.  Exercise regularly and maintain a healthy weight.  Weight loss and exercise will decrease pressure on your bladder and help you control your leakage.   Use a catheter as directed  to help empty your bladder. A catheter is a tiny, plastic  tube that is put into your bladder to drain your urine.   Go to behavior therapy as directed.  Behavior therapy may be used to help you learn to control your urge to urinate.     © Copyright [employer] 2018 Information is for End User's use only and may not be sold, redistributed or otherwise used for commercial purposes. All illustrations and images included in CareNotes® are the copyrighted property of [employer] or [employer]

## 2024-05-28 DIAGNOSIS — I48.0 PAROXYSMAL ATRIAL FIBRILLATION (HCC): ICD-10-CM

## 2024-05-29 RX ORDER — METOPROLOL SUCCINATE 25 MG/1
TABLET, EXTENDED RELEASE ORAL
Qty: 30 TABLET | Refills: 5 | Status: SHIPPED | OUTPATIENT
Start: 2024-05-29

## 2024-06-03 DIAGNOSIS — E03.9 ACQUIRED HYPOTHYROIDISM: ICD-10-CM

## 2024-06-03 RX ORDER — LEVOTHYROXINE SODIUM 0.07 MG/1
TABLET ORAL
Qty: 90 TABLET | Refills: 1 | Status: SHIPPED | OUTPATIENT
Start: 2024-06-03

## 2024-06-12 DIAGNOSIS — I10 ESSENTIAL HYPERTENSION: ICD-10-CM

## 2024-06-12 DIAGNOSIS — I48.0 PAROXYSMAL ATRIAL FIBRILLATION (HCC): ICD-10-CM

## 2024-06-12 RX ORDER — AMLODIPINE BESYLATE AND BENAZEPRIL HYDROCHLORIDE 5; 20 MG/1; MG/1
CAPSULE ORAL
Qty: 30 CAPSULE | Refills: 5 | Status: SHIPPED | OUTPATIENT
Start: 2024-06-12

## 2024-06-12 RX ORDER — APIXABAN 2.5 MG/1
TABLET, FILM COATED ORAL
Qty: 60 TABLET | Refills: 0 | Status: SHIPPED | OUTPATIENT
Start: 2024-06-12

## 2024-06-12 NOTE — TELEPHONE ENCOUNTER
Patient needs updated blood work. Please place orders. A courtesy refill was provided.     - Needs updated CBC

## 2024-07-16 DIAGNOSIS — I48.0 PAROXYSMAL ATRIAL FIBRILLATION (HCC): ICD-10-CM

## 2024-07-17 DIAGNOSIS — I48.0 PAROXYSMAL ATRIAL FIBRILLATION (HCC): ICD-10-CM

## 2024-07-17 RX ORDER — APIXABAN 2.5 MG/1
TABLET, FILM COATED ORAL
Qty: 60 TABLET | Refills: 2 | Status: SHIPPED | OUTPATIENT
Start: 2024-07-17

## 2024-07-17 NOTE — TELEPHONE ENCOUNTER
Reason for call:   [x] Refill   [] Prior Auth  [] Other:     Office: [facility]   [x] PCP/Provider - Rosie Vasquez   [] Specialty/Provider -     Medication: apixaban (Eliquis)     Dose/Frequency: 2.5 mg/ twice daily     Quantity: 30 day supply    Pharmacy: [facility] in [place]     Does the patient have enough for 3 days?   [] Yes   [x] No - Send as HP to POD- pt is out completely.

## 2024-09-06 ENCOUNTER — NURSE TRIAGE (OUTPATIENT)
Age: 89
End: 2024-09-06

## 2024-09-06 NOTE — TELEPHONE ENCOUNTER
Patient has been constipated for about a week, she states she's tried stool softener and enema's and nothing has worked. She states she feels hard lumps and has a white tongue. Pt is asking your advice on what to take for the constipation.

## 2024-09-09 ENCOUNTER — APPOINTMENT (EMERGENCY)
Dept: CT IMAGING | Facility: HOSPITAL | Age: 89
End: 2024-09-09
Payer: COMMERCIAL

## 2024-09-09 ENCOUNTER — HOSPITAL ENCOUNTER (EMERGENCY)
Facility: HOSPITAL | Age: 89
Discharge: HOME | End: 2024-09-09
Attending: EMERGENCY MEDICINE
Payer: COMMERCIAL

## 2024-09-09 VITALS
TEMPERATURE: 98.1 F | RESPIRATION RATE: 16 BRPM | DIASTOLIC BLOOD PRESSURE: 87 MMHG | HEART RATE: 105 BPM | SYSTOLIC BLOOD PRESSURE: 137 MMHG | OXYGEN SATURATION: 99 %

## 2024-09-09 DIAGNOSIS — K86.2 PANCREATIC CYST: ICD-10-CM

## 2024-09-09 DIAGNOSIS — K20.90 ESOPHAGITIS: ICD-10-CM

## 2024-09-09 DIAGNOSIS — K59.00 CONSTIPATION: Primary | ICD-10-CM

## 2024-09-09 LAB
ALBUMIN SERPL BCG-MCNC: 4.1 G/DL (ref 3.5–5)
ALP SERPL-CCNC: 74 U/L (ref 34–104)
ALT SERPL W P-5'-P-CCNC: 21 U/L (ref 7–52)
ANION GAP SERPL CALCULATED.3IONS-SCNC: 9 MMOL/L (ref 4–13)
AST SERPL W P-5'-P-CCNC: 27 U/L (ref 13–39)
ATRIAL RATE: 97 BPM
BASOPHILS # BLD AUTO: 0.02 THOUSANDS/ÂΜL (ref 0–0.1)
BASOPHILS NFR BLD AUTO: 0 % (ref 0–1)
BILIRUB SERPL-MCNC: 0.42 MG/DL (ref 0.2–1)
BILIRUB UR QL STRIP: NEGATIVE
BUN SERPL-MCNC: 15 MG/DL (ref 5–25)
CALCIUM SERPL-MCNC: 10.2 MG/DL (ref 8.4–10.2)
CHLORIDE SERPL-SCNC: 101 MMOL/L (ref 96–108)
CLARITY UR: CLEAR
CO2 SERPL-SCNC: 25 MMOL/L (ref 21–32)
COLOR UR: YELLOW
CREAT SERPL-MCNC: 0.95 MG/DL (ref 0.6–1.3)
EOSINOPHIL # BLD AUTO: 0.07 THOUSAND/ÂΜL (ref 0–0.61)
EOSINOPHIL NFR BLD AUTO: 1 % (ref 0–6)
ERYTHROCYTE [DISTWIDTH] IN BLOOD BY AUTOMATED COUNT: 15.3 % (ref 11.6–15.1)
GFR SERPL CREATININE-BSD FRML MDRD: 51 ML/MIN/1.73SQ M
GLUCOSE SERPL-MCNC: 94 MG/DL (ref 65–140)
GLUCOSE UR STRIP-MCNC: NEGATIVE MG/DL
HCT VFR BLD AUTO: 36.3 % (ref 34.8–46.1)
HGB BLD-MCNC: 11.6 G/DL (ref 11.5–15.4)
HGB UR QL STRIP.AUTO: NEGATIVE
IMM GRANULOCYTES # BLD AUTO: 0.02 THOUSAND/UL (ref 0–0.2)
IMM GRANULOCYTES NFR BLD AUTO: 0 % (ref 0–2)
KETONES UR STRIP-MCNC: ABNORMAL MG/DL
LEUKOCYTE ESTERASE UR QL STRIP: NEGATIVE
LYMPHOCYTES # BLD AUTO: 1.2 THOUSANDS/ÂΜL (ref 0.6–4.47)
LYMPHOCYTES NFR BLD AUTO: 20 % (ref 14–44)
MCH RBC QN AUTO: 27 PG (ref 26.8–34.3)
MCHC RBC AUTO-ENTMCNC: 32 G/DL (ref 31.4–37.4)
MCV RBC AUTO: 84 FL (ref 82–98)
MONOCYTES # BLD AUTO: 0.57 THOUSAND/ÂΜL (ref 0.17–1.22)
MONOCYTES NFR BLD AUTO: 10 % (ref 4–12)
NEUTROPHILS # BLD AUTO: 4.12 THOUSANDS/ÂΜL (ref 1.85–7.62)
NEUTS SEG NFR BLD AUTO: 69 % (ref 43–75)
NITRITE UR QL STRIP: NEGATIVE
NRBC BLD AUTO-RTO: 0 /100 WBCS
PH UR STRIP.AUTO: 8.5 [PH] (ref 4.5–8)
PLATELET # BLD AUTO: 273 THOUSANDS/UL (ref 149–390)
PMV BLD AUTO: 8.7 FL (ref 8.9–12.7)
POTASSIUM SERPL-SCNC: 4.3 MMOL/L (ref 3.5–5.3)
PR INTERVAL: 192 MS
PROT SERPL-MCNC: 7.2 G/DL (ref 6.4–8.4)
PROT UR STRIP-MCNC: NEGATIVE MG/DL
QRS AXIS: 161 DEGREES
QRSD INTERVAL: 114 MS
QT INTERVAL: 408 MS
QTC INTERVAL: 518 MS
RBC # BLD AUTO: 4.3 MILLION/UL (ref 3.81–5.12)
SODIUM SERPL-SCNC: 135 MMOL/L (ref 135–147)
SP GR UR STRIP.AUTO: 1.01 (ref 1–1.03)
T WAVE AXIS: 187 DEGREES
UROBILINOGEN UR QL STRIP.AUTO: 0.2 E.U./DL
VENTRICULAR RATE: 97 BPM
WBC # BLD AUTO: 6 THOUSAND/UL (ref 4.31–10.16)

## 2024-09-09 PROCEDURE — 80053 COMPREHEN METABOLIC PANEL: CPT | Performed by: EMERGENCY MEDICINE

## 2024-09-09 PROCEDURE — 85025 COMPLETE CBC W/AUTO DIFF WBC: CPT | Performed by: EMERGENCY MEDICINE

## 2024-09-09 PROCEDURE — 93005 ELECTROCARDIOGRAM TRACING: CPT

## 2024-09-09 PROCEDURE — 81003 URINALYSIS AUTO W/O SCOPE: CPT

## 2024-09-09 PROCEDURE — 36415 COLL VENOUS BLD VENIPUNCTURE: CPT | Performed by: EMERGENCY MEDICINE

## 2024-09-09 PROCEDURE — 99285 EMERGENCY DEPT VISIT HI MDM: CPT | Performed by: EMERGENCY MEDICINE

## 2024-09-09 PROCEDURE — 74177 CT ABD & PELVIS W/CONTRAST: CPT

## 2024-09-09 PROCEDURE — 99284 EMERGENCY DEPT VISIT MOD MDM: CPT

## 2024-09-09 RX ADMIN — IOHEXOL 85 ML: 350 INJECTION, SOLUTION INTRAVENOUS at 15:56

## 2024-09-09 NOTE — DISCHARGE INSTRUCTIONS
Bowel Regimen:  1.) Colace, take one tablet twice a day.  This can be continued indefinitely.  2.) Miralax.  Take one capful twice a day until you start having bowel movements, then decrease to one capful per day.  Once you feel your bowels have returned to normal, then you can stop the miralax.    Your CT resulted with several incidental findings includin.) Thickening of the distal esophagus/gastric cardia region which may be related to esophagitis/hiatus hernia and/or gastritis.  Evaluation by a gastroenterologist is recommended.  2.) Multiple pancreatic cysts with the largest measuring up to 2.2 cm. MR/MRCP is recommended, you can discuss this with your family physician

## 2024-09-09 NOTE — ED PROVIDER NOTES
History  Chief Complaint   Patient presents with    Constipation     Last BM about 2 weeks ago. Mild abdominal pain.      A 89-year-old female with past medical history of anxiety, depression, hyperlipidemia, A-fib, hypertension and CKD; presents with concerns for constipation.  Patient states she has not been able to have a bowel movement in the past 2 weeks.  She states she has passed very small amounts of loose stool in the past few days.  Stool is nonbloody.  She does report lower abdominal discomfort.  Patient otherwise denies fever, chills, chest pain, shortness of breath, nausea, vomiting, dysuria, peripheral edema and rashes.  Patient reports she has had a regular appetite, however believes she has been eating less.  She does report a 5 pound weight loss.  Patient has tried Colace and natural remedies without improvement.  Patient denies a history of constipation, she has never had a colonoscopy.      History provided by:  Patient, medical records and relative  Constipation  Associated symptoms: abdominal pain        Prior to Admission Medications   Prescriptions Last Dose Informant Patient Reported? Taking?   amLODIPine (NORVASC) 2.5 mg tablet   No No   Sig: TAKE 1 TABLET (2.5 MG TOTAL) BY MOUTH DAILY CAN BE TAKE IN IN ADDITION TO REGULAR BLOOD PRESSURE MEDS IF BP >160/85   amLODIPine-benazepril (LOTREL 5-20) 5-20 MG per capsule   No No   Si CAP BY MOUTH ONCE DAILY   apixaban (Eliquis) 2.5 mg   No No   Sig: TAKE 1 TABLET (2.5 MG TOTAL) BY MOUTH 2 (TWO) TIMES A DAY   furosemide (LASIX) 20 mg tablet   No No   Sig: Take 0.5 tablets (10 mg total) by mouth daily Prn for ankle swellin   levothyroxine 75 mcg tablet   No No   Si TAB BY MOUTH ONCE DAILY AS DIRECTED.   metoprolol succinate (TOPROL-XL) 25 mg 24 hr tablet   No No   Sig: TAKE 1 TABLET BY MOUTH DAILY **SWALLOW WHOLE-DO NOT CHEW OR CRUSH*      Facility-Administered Medications: None       Past Medical History:   Diagnosis Date    A-fib (HCC)      Anxiety     Depression     Fainting     Hypertension     Sick sinus syndrome (HCC)        History reviewed. No pertinent surgical history.    Family History   Problem Relation Age of Onset    Atrial fibrillation Sister     Other Father         cardiac pacemaker     Pancreatic cancer Brother      I have reviewed and agree with the history as documented.    E-Cigarette/Vaping    E-Cigarette Use Never User      E-Cigarette/Vaping Substances     Social History     Tobacco Use    Smoking status: Former    Smokeless tobacco: Never   Vaping Use    Vaping status: Never Used   Substance Use Topics    Alcohol use: Yes    Drug use: No       Review of Systems   Constitutional:  Positive for unexpected weight change.   Gastrointestinal:  Positive for abdominal pain and constipation.   All other systems reviewed and are negative.      Physical Exam  Physical Exam  General Appearance: alert and oriented, nad, non toxic appearing  Skin:  Warm, dry, intact.  No cyanosis  HEENT: Atraumatic, normocephalic.  No eye drainage.  Normal hearing.  Moist mucous membranes.    Neck: Supple, trachea midline  Cardiac: RRR; no murmurs, rub, gallops.  No pedal edema, 2+ pulses  Pulmonary: lungs CTAB; no wheezes, rales, rhonchi  Gastrointestinal: abdomen soft, nontender, nondistended; no guarding or rebound tenderness; good bowel sounds, no mass or bruits  Extremities:  No deformities.  No calf tenderness, no clubbing  Neuro:  no focal motor or sensory deficits, CN 2-12 grossly intact  Psych:  Normal mood and affect, normal judgement and insight      Vital Signs  ED Triage Vitals   Temperature Pulse Respirations Blood Pressure SpO2   09/09/24 1321 09/09/24 1321 09/09/24 1321 09/09/24 1321 09/09/24 1321   98.1 °F (36.7 °C) 104 18 (!) 178/80 100 %      Temp src Heart Rate Source Patient Position - Orthostatic VS BP Location FiO2 (%)   -- -- -- -- --             Pain Score       09/09/24 1616       5           Vitals:    09/09/24 1321 09/09/24 1616    BP: (!) 178/80 137/87   Pulse: 104 105         Visual Acuity      ED Medications  Medications   iohexol (OMNIPAQUE) 350 MG/ML injection (SINGLE-DOSE) 85 mL (85 mL Intravenous Given 9/9/24 1556)       Diagnostic Studies  Results Reviewed       Procedure Component Value Units Date/Time    Urine Macroscopic, POC [[id number]]  (Abnormal) Collected: 09/09/24 1747    Lab Status: Final result Specimen: Urine Updated: 09/09/24 1749     Color, UA Yellow     Clarity, UA Clear     pH, UA 8.5     Leukocytes, UA Negative     Nitrite, UA Negative     Protein, UA Negative mg/dl      Glucose, UA Negative mg/dl      Ketones, UA 15 (1+) mg/dl      Urobilinogen, UA 0.2 E.U./dl      Bilirubin, UA Negative     Occult Blood, UA Negative     Specific Gravity, UA 1.015    Narrative:      [facility] RESULT    Comprehensive metabolic panel [[id number]] Collected: 09/09/24 1506    Lab Status: Final result Specimen: Blood from Arm, Left Updated: 09/09/24 1527     Sodium 135 mmol/L      Potassium 4.3 mmol/L      Chloride 101 mmol/L      CO2 25 mmol/L      ANION GAP 9 mmol/L      BUN 15 mg/dL      Creatinine 0.95 mg/dL      Glucose 94 mg/dL      Calcium 10.2 mg/dL      AST 27 U/L      ALT 21 U/L      Alkaline Phosphatase 74 U/L      Total Protein 7.2 g/dL      Albumin 4.1 g/dL      Total Bilirubin 0.42 mg/dL      eGFR 51 ml/min/1.73sq m     Narrative:      National Kid[employer] Foundation guidelines for Chronic Kidney Disease (CKD):     Stage 1 with normal or high GFR (GFR > 90 mL/min/1.73 square meters)    Stage 2 Mild CKD (GFR = 60-89 mL/min/1.73 square meters)    Stage 3A Moderate CKD (GFR = 45-59 mL/min/1.73 square meters)    Stage 3B Moderate CKD (GFR = 30-44 mL/min/1.73 square meters)    Stage 4 Severe CKD (GFR = 15-29 mL/min/1.73 square meters)    Stage 5 End Stage CKD (GFR <15 mL/min/1.73 square meters)  Note: GFR calculation is accurate only with a steady state creatinine    CBC and differential [[id number]]  (Abnormal) Collected:  09/09/24 1506    Lab Status: Final result Specimen: Blood from Arm, Left Updated: 09/09/24 1511     WBC 6.00 Thousand/uL      RBC 4.30 Million/uL      Hemoglobin 11.6 g/dL      Hematocrit 36.3 %      MCV 84 fL      MCH 27.0 pg      MCHC 32.0 g/dL      RDW 15.3 %      MPV 8.7 fL      Platelets 273 Thousands/uL      nRBC 0 /100 WBCs      Segmented % 69 %      Immature Grans % 0 %      Lymphocytes % 20 %      Monocytes % 10 %      Eosinophils Relative 1 %      Basophils Relative 0 %      Absolute Neutrophils 4.12 Thousands/µL      Absolute Immature Grans 0.02 Thousand/uL      Absolute Lymphocytes 1.20 Thousands/µL      Absolute Monocytes 0.57 Thousand/µL      Eosinophils Absolute 0.07 Thousand/µL      Basophils Absolute 0.02 Thousands/µL                    CT abdomen pelvis with contrast   Final Result by Colton Gann MD (09/09 1643)      Thickening of the distal esophagus/gastric cardia region which may be related to esophagitis/hiatus hernia and/or gastritis. Further GI work-up is advised.      No evidence of small bowel obstruction or colonic distention.      Multiple pancreatic cysts with the largest measuring up to 2.2 cm. Further evaluation with MR/MRCP is advised.      Gallbladder wall thickening without obvious stone. This should be correlated any right upper quadrant pain. Trace pericholecystic fluid may be present versus partial voluming of fat. Ultrasound may be helpful if there is right upper quadrant tenderness.      Moderate bladder distention with wall thickening should be correlated with urinalysis for any evidence of inflammation.      The study was marked in EPIC for immediate notification.         Workstation performed: [id number]                    Procedures  Procedures   ECG 12 Lead Documentation  Date/Time: today/date: 9/9/2024  Performed by: Chastity Goins    ECG reviewed by me, the ED Provider: yes    Patient location:  ED   Previous ECG:  Compared to current, no change   Rate:  97  ECG  rate assessment: normal    Rhythm: atrial fibrillation    Ectopy:  none    QRS axis:  Normal  Intervals: RBBB   Q waves: None   ST segments:  Normal  T waves: normal      Impression: Atrial fibrillation, rate controlled, with RBBB      ED Course  ED Course as of 09/09/24 2035   Mon Sep 09, 2024   1529 Comprehensive metabolic panel  WNL   1717 CT abdomen pelvis with contrast  1.) Thickening of the distal esophagus/gastric cardia region which may be related to esophagitis/hiatus hernia and/or gastritis. Further GI work-up is advised.  2.) No evidence of small bowel obstruction or colonic distention.  3.) Multiple pancreatic cysts with the largest measuring up to 2.2 cm. Further evaluation with MR/MRCP is advised.  4.) Gallbladder wall thickening without obvious stone. This should be correlated any right upper quadrant pain. Trace pericholecystic fluid may be present versus partial voluming of fat. Ultrasound may be helpful if there is right upper quadrant tenderness.  5.) Moderate bladder distention with wall thickening should be correlated with urinalysis for any evidence of inflammation.    Pt without significant abdominal tenderness.  Will refer to GI for further evaluation of pancreatic cysts and esophageal thickening.  Will check UA for infection.  Will plan to start bowel regimen.   1735 Pt and daughter updated on CT results.  Pt will follow up with her family physician to discuss further testing based on incidental findings.  Discussed bowel regimen to assist in the constipation, pt in agreement.   1752 Urine Macroscopic, POC(!)  Negative for infection                                               Medical Decision Making  A 89-year-old female presents with abdominal pain and constipation that has been ongoing for the past 2 weeks.  She is overall well-appearing with a benign abdominal exam.  Given acute change in bowels, will check labs and CT scan to evaluate for obstruction, malignancy and stool  burden.    Amount and/or Complexity of Data Reviewed  Labs: ordered. Decision-making details documented in ED Course.  Radiology: ordered. Decision-making details documented in ED Course.    Risk  Prescription drug management.                 Disposition  Final diagnoses:   Constipation   Esophagitis   Pancreatic cyst     Time reflects when diagnosis was documented in both MDM as applicable and the Disposition within this note       Time User Action Codes Description Comment    9/9/2024  5:56 PM Chastity Goins Add [K59.00] Constipation     9/9/2024  5:56 PM Chastity Goins Add [K20.90] Esophagitis     9/9/2024  5:56 PM Leopoldo Goinsyssa Add [K86.2] Pancreatic cyst           ED Disposition       ED Disposition   Discharge    Condition   Stable    Date/Time   Mon Sep 9, 2024  5:56 PM    Comment   Denise Garcia discharge to home/self care.                   Follow-up Information       Follow up With Specialties Details Why Contact Cynthia Vasquez DO Family Medicine Schedule an appointment as soon as possible for a visit  For re-evaluation and to discuss incidental findings (listed below) [address]  [address]  [facility] [address]  [phone]              Discharge Medication List as of 9/9/2024  5:57 PM        CONTINUE these medications which have NOT CHANGED    Details   amLODIPine (NORVASC) 2.5 mg tablet TAKE 1 TABLET (2.5 MG TOTAL) BY MOUTH DAILY CAN BE TAKE IN IN ADDITION TO REGULAR BLOOD PRESSURE MEDS IF BP >160/85, Starting Tue 10/11/2022, Normal      amLODIPine-benazepril (LOTREL 5-20) 5-20 MG per capsule 1 CAP BY MOUTH ONCE DAILY, Normal      apixaban (Eliquis) 2.5 mg TAKE 1 TABLET (2.5 MG TOTAL) BY MOUTH 2 (TWO) TIMES A DAY, Normal      furosemide (LASIX) 20 mg tablet Take 0.5 tablets (10 mg total) by mouth daily Prn for ankle swellin, Starting Mon 4/1/2024, Normal      levothyroxine 75 mcg tablet 1 TAB BY MOUTH ONCE DAILY AS DIRECTED., Normal      metoprolol succinate (TOPROL-XL) 25 mg  24 hr tablet TAKE 1 TABLET BY MOUTH DAILY **SWALLOW WHOLE-DO NOT CHEW OR CRUSH*, Normal             No discharge procedures on file.    PDMP Review         Value Time User    PDMP Reviewed  Yes 5/23/2022  6:08 PM Rosie Vasquez DO            ED Provider  Electronically Signed by             Chastity Goins DO  09/09/24 2035

## 2024-09-09 NOTE — TELEPHONE ENCOUNTER
"Patient reports she is having very small bowel movements but not any large ones. It has been 2 weeks since she had a normal BM and she never had issues with constipation before. She has been using enemas, laxatives, and suppositories with no relief. She reports she was checked by a nurse this morning and told she has bowel sounds but they are decreased and she could have an impaction. Patient reports her abdomen is hard and she is going to have one of her children take her to the ED to rule out impaction.     Reason for Disposition   Abdomen is more swollen than usual    Answer Assessment - Initial Assessment Questions  1. STOOL PATTERN OR FREQUENCY: \"How often do you pass bowel movements (BMs)?\"  (Normal range: tid to q 3 days)  \"When was the last BM passed?\"        Normally daily, over 2 weeks since LBM  2. STRAINING: \"Do you have to strain to have a BM?\"       Yes   3. RECTAL PAIN: \"Does your rectum hurt when the stool comes out?\" If Yes, ask: \"Do you have hemorrhoids? How bad is the pain?\"  (Scale 1-10; or mild, moderate, severe)      Hemorrhoids  4. STOOL COMPOSITION: \"Are the stools hard?\"       Yes  5. BLOOD ON STOOLS: \"Has there been any blood on the toilet tissue or on the surface of the BM?\" If Yes, ask: \"When was the last time?\"       Denies   6. CHRONIC CONSTIPATION: \"Is this a new problem for you?\"  If no, ask: \"How long have you had this problem?\" (days, weeks, months)       Denies   7. CHANGES IN DIET OR HYDRATION: \"Have there been any recent changes in your diet?\" \"How much fluids are you drinking consuming on a daily basis?\"  \"How much have you had to drink today?\"      Eating less   8. MEDICATIONS: \"Have you been taking any new medications?\" \"Are you taking any narcotic pain medications?\" (e.g., Vicoden, Percocet, morphine, dilaudid)      Denies  9. LAXATIVES: \"Have you been using any stool softeners, laxatives, or enemas?\"  If yes, ask \"What, how often, and when was the last time?\"  10.ACTIVITY:  " "\"How much walking do you do every day? on a daily basis?\"  \"Has your activity level decreased in the past week?\"         Tried all, no relief  11. CAUSE: \"What do you think is causing the constipation?\"         Unaware  12. OTHER SYMPTOMS: \"Do you have any other symptoms?\" (e.g., abdominal pain, bloating, fever, vomiting)        Denies   13. MEDICAL HISTORY: \"Do you have a history of hemorrhoids, rectal fissures, or rectal surgery or rectal abscess?\"          Denies   14. PREGNANCY: \"Is there any chance you are pregnant?\" \"When was your last menstrual period?\"        N/A    Protocols used: Constipation-ADULT-[place]    "

## 2024-09-10 ENCOUNTER — VBI (OUTPATIENT)
Dept: FAMILY MEDICINE CLINIC | Facility: CLINIC | Age: 89
End: 2024-09-10

## 2024-09-10 LAB
QRS AXIS: 101 DEGREES
QRSD INTERVAL: 126 MS
QT INTERVAL: 394 MS
QTC INTERVAL: 500 MS
T WAVE AXIS: -56 DEGREES
VENTRICULAR RATE: 97 BPM

## 2024-09-10 PROCEDURE — 93010 ELECTROCARDIOGRAM REPORT: CPT | Performed by: INTERNAL MEDICINE

## 2024-09-10 NOTE — TELEPHONE ENCOUNTER
09/10/24 11:02 AM    Patient contacted post ED visit, [facility] department spoke with patient/caregiver and outreach was successful.    Thank you.  Regine Pacheco MA  [facility]

## 2024-09-20 ENCOUNTER — OFFICE VISIT (OUTPATIENT)
Dept: FAMILY MEDICINE CLINIC | Facility: CLINIC | Age: 89
End: 2024-09-20
Payer: COMMERCIAL

## 2024-09-20 ENCOUNTER — TELEPHONE (OUTPATIENT)
Age: 89
End: 2024-09-20

## 2024-09-20 VITALS
OXYGEN SATURATION: 99 % | DIASTOLIC BLOOD PRESSURE: 76 MMHG | TEMPERATURE: 96.8 F | BODY MASS INDEX: 21.79 KG/M2 | HEIGHT: 57 IN | HEART RATE: 90 BPM | SYSTOLIC BLOOD PRESSURE: 158 MMHG | WEIGHT: 101 LBS

## 2024-09-20 DIAGNOSIS — B02.9 HERPES ZOSTER WITHOUT COMPLICATION: Primary | ICD-10-CM

## 2024-09-20 PROCEDURE — 99214 OFFICE O/P EST MOD 30 MIN: CPT | Performed by: NURSE PRACTITIONER

## 2024-09-20 RX ORDER — VALACYCLOVIR HYDROCHLORIDE 1 G/1
1000 TABLET, FILM COATED ORAL DAILY
Qty: 7 TABLET | Refills: 0 | Status: SHIPPED | OUTPATIENT
Start: 2024-09-20 | End: 2024-09-27

## 2024-09-20 RX ORDER — LIDOCAINE 50 MG/G
OINTMENT TOPICAL 3 TIMES DAILY PRN
Qty: 50 G | Refills: 0 | Status: SHIPPED | OUTPATIENT
Start: 2024-09-20 | End: 2024-09-20

## 2024-09-20 RX ORDER — LIDOCAINE 50 MG/G
OINTMENT TOPICAL 3 TIMES DAILY PRN
Qty: 50 G | Refills: 0 | Status: SHIPPED | OUTPATIENT
Start: 2024-09-20 | End: 2024-10-20

## 2024-09-20 NOTE — PROGRESS NOTES
"Ambulatory Visit  Name: Denise Garcia      : 3/28/1931      MRN: [id number]  Encounter Provider: CAMI Williamson  Encounter Date: 2024   Encounter department: [facility] PRIMARY CARE    Assessment & Plan  Herpes zoster without complication  Discussed rash with patient and management including pain management and anti-viral.  Creatinine clearance 27mL/min- per guidelines renal dosing for Valtrex if usual dose 1000 mg every 8 hours would be 1000 mg daily.   Topical lidocaine ointment PRN for pain management.   Advised on other symptom management measures such as using a donut cushion for sitting down being very gentle with wiping or using a water bottle to help clean the area versus wiping to prevent any aggravation.  Orders:    valACYclovir (VALTREX) 1,000 mg tablet; Take 1 tablet (1,000 mg total) by mouth daily for 7 days    lidocaine (XYLOCAINE) 5 % ointment; Apply topically 3 (three) times a day as needed for mild pain       History of Present Illness     Here for evaluation of rash on buttock area. This started with pain on Wednesday. Using neosporin without any relief. Pain with touch/sitting. BMs normal. No fevers or chills. No bleeding present.  Patient denies any history of herpes.  She states she has had a cold sore before in the past.       Rash  Pertinent negatives include no diarrhea or fever.          Review of Systems   Constitutional:  Negative for chills and fever.   Gastrointestinal:  Positive for rectal pain. Negative for abdominal pain, blood in stool, constipation and diarrhea.   Skin:  Positive for rash.           Objective     /76 (BP Location: Left arm, Patient Position: Sitting, Cuff Size: Adult)   Pulse 90   Temp (!) 96.8 °F (36 °C) (Temporal)   Ht 4' 9\" (1.448 m)   Wt 45.8 kg (101 lb)   SpO2 99%   BMI 21.86 kg/m²     Physical Exam  Vitals and nursing note reviewed.   Constitutional:       General: She is not in acute distress.     " Appearance: Normal appearance. She is well-developed. She is not diaphoretic.   HENT:      Head: Normocephalic and atraumatic.      Right Ear: External ear normal.      Left Ear: External ear normal.   Eyes:      General: Lids are normal.         Right eye: No discharge.         Left eye: No discharge.      Conjunctiva/sclera: Conjunctivae normal.   Pulmonary:      Effort: Pulmonary effort is normal. No respiratory distress.   Genitourinary:      Musculoskeletal:         General: No deformity.   Skin:     General: Skin is warm and dry.   Neurological:      General: No focal deficit present.      Mental Status: She is alert and oriented to person, place, and time.   Psychiatric:         Speech: Speech normal.         Behavior: Behavior normal.         Thought Content: Thought content normal.         Judgment: Judgment normal.

## 2024-09-20 NOTE — TELEPHONE ENCOUNTER
Patient reports a rash on her buttocks that started Wednesday. She used Neosporin for it but has not relieved her pain. She has issues sitting and walking due to it. Scheduled for today at 11:10 with Jessica.

## 2024-09-23 ENCOUNTER — TELEPHONE (OUTPATIENT)
Dept: FAMILY MEDICINE CLINIC | Facility: CLINIC | Age: 89
End: 2024-09-23

## 2024-09-23 NOTE — TELEPHONE ENCOUNTER
PA for Lidocaine 5% Ointment SUBMITTED     via    [x]CMM-KEY: [id number]  []Surescripts  []Faxed to plan   []Other website   []Phone call Case ID #     Office notes sent, clinical questions answered. Awaiting determination    Turnaround time for your insurance to make a decision on your Prior Authorization can take 7-21 business days.

## 2024-09-24 ENCOUNTER — TELEPHONE (OUTPATIENT)
Dept: FAMILY MEDICINE CLINIC | Facility: CLINIC | Age: 89
End: 2024-09-24

## 2024-09-24 NOTE — TELEPHONE ENCOUNTER
PA for Lidocaine 5% Ointment DENIED    Reason:    Message sent to office clinical pool Yes    Denial letter scanned into Media Yes    Appeal started No (Provider will need to decide if appeal is warranted and send clinical documentation to Prior Authorization Team for initiation.)    **Please follow up with your patient regarding denial and next steps**

## 2024-09-24 NOTE — TELEPHONE ENCOUNTER
Duplicate encounter created, please see telephone encounter from 09/23/2024 regarding Lidocaine PA status. Please review patient's chart to see if there is already an encounter regarding the medication in question and to document anything regarding this medication in regards to anything regarding the authorization process etc before creating another encounter Thank You.

## 2024-09-27 ENCOUNTER — RA CDI HCC (OUTPATIENT)
Dept: OTHER | Facility: HOSPITAL | Age: 89
End: 2024-09-27

## 2024-10-04 ENCOUNTER — OFFICE VISIT (OUTPATIENT)
Dept: FAMILY MEDICINE CLINIC | Facility: CLINIC | Age: 89
End: 2024-10-04
Payer: COMMERCIAL

## 2024-10-04 VITALS
BODY MASS INDEX: 22.22 KG/M2 | WEIGHT: 103 LBS | HEIGHT: 57 IN | SYSTOLIC BLOOD PRESSURE: 122 MMHG | OXYGEN SATURATION: 97 % | HEART RATE: 111 BPM | TEMPERATURE: 97.2 F | DIASTOLIC BLOOD PRESSURE: 68 MMHG

## 2024-10-04 DIAGNOSIS — I10 BENIGN ESSENTIAL HTN: Primary | ICD-10-CM

## 2024-10-04 DIAGNOSIS — I48.0 PAROXYSMAL ATRIAL FIBRILLATION (HCC): ICD-10-CM

## 2024-10-04 DIAGNOSIS — E03.9 ACQUIRED HYPOTHYROIDISM: ICD-10-CM

## 2024-10-04 PROCEDURE — G2211 COMPLEX E/M VISIT ADD ON: HCPCS | Performed by: FAMILY MEDICINE

## 2024-10-04 PROCEDURE — 99214 OFFICE O/P EST MOD 30 MIN: CPT | Performed by: FAMILY MEDICINE

## 2024-10-04 NOTE — PROGRESS NOTES
Ambulatory Visit  Name: Denise Garcia      : 3/28/1931      MRN: [id number]  Encounter Provider: Rosie Vasquez DO  Encounter Date: 10/4/2024   Encounter department: [facility] PRIMARY CARE    Assessment & Plan  Paroxysmal atrial fibrillation (HCC)    Orders:    apixaban (Eliquis) 2.5 mg; Take 1 tablet (2.5 mg total) by mouth 2 (two) times a day    Benign essential HTN    Orders:    Comprehensive metabolic panel; Future    Comprehensive metabolic panel    Acquired hypothyroidism    Orders:    TSH, 3rd generation; Future    TSH, 3rd generation      Depression Screening and Follow-up Plan: Clincally patient does not have depression. No treatment is required.       History of Present Illness     89 year old female here for 6 month check up    Does follow-up with cardiology for atrial fibrillation.  Was seen recently  with shingles diagnosis.  Did not have a ED visit for constipation with no evidence of small bowel obstruction.  Incidental findings reviewed: (No previous comparison)  Thickening of the distal esophagus/gastric cardia region which may be related to esophagitis/hiatus hernia and/or gastritis. Further GI work-up is advised.    Multiple pancreatic cysts with the largest measuring up to 2.2 cm. Further evaluation with MR/MRCP is advised.     Gallbladder wall thickening without obvious stone. This should be correlated any right upper quadrant pain. Trace pericholecystic fluid may be present versus partial voluming of fat. Ultrasound may be helpful if there is right upper quadrant tenderness.     Moderate bladder distention with wall thickening should be correlated with urinalysis for any evidence of inflammation.  Chief Complaint   Patient presents with    Follow-up     6 month follow up         Review of Systems   Constitutional:  Negative for appetite change (Normal).   Gastrointestinal:  Positive for constipation.        No reflux   Genitourinary: Negative.   "  Psychiatric/Behavioral:          -good support with family           Objective     /68   Pulse (!) 111   Temp (!) 97.2 °F (36.2 °C)   Ht 4' 9\" (1.448 m)   Wt 46.7 kg (103 lb)   SpO2 97%   BMI 22.29 kg/m²     Physical Exam  Constitutional:       General: She is not in acute distress.     Appearance: Normal appearance. She is well-developed and normal weight.      Comments: Petite 89 year old female appears younger than stated age   HENT:      Head: Normocephalic.      Right Ear: Tympanic membrane normal.      Left Ear: Tympanic membrane normal.      Nose: Nose normal.      Mouth/Throat:      Mouth: Mucous membranes are moist.   Eyes:      General: No scleral icterus.     Conjunctiva/sclera: Conjunctivae normal.      Pupils: Pupils are equal, round, and reactive to light.   Neck:      Vascular: No carotid bruit.   Cardiovascular:      Rate and Rhythm: Normal rate. Rhythm irregular.      Pulses: Normal pulses.      Heart sounds: No murmur heard.  Pulmonary:      Effort: Pulmonary effort is normal.      Breath sounds: Normal breath sounds.   Abdominal:      General: Bowel sounds are normal.      Palpations: Abdomen is soft. There is no mass.      Tenderness: There is no abdominal tenderness.   Musculoskeletal:         General: Normal range of motion.      Comments: Gait steady with walker   Skin:     Findings: No rash.   Neurological:      Mental Status: She is alert and oriented to person, place, and time.      Deep Tendon Reflexes: Reflexes are normal and symmetric.   Psychiatric:         Mood and Affect: Mood normal.         Behavior: Behavior normal.         Thought Content: Thought content normal.         Judgment: Judgment normal.         "

## 2024-12-11 DIAGNOSIS — I10 ESSENTIAL HYPERTENSION: ICD-10-CM

## 2024-12-12 RX ORDER — AMLODIPINE AND BENAZEPRIL HYDROCHLORIDE 5; 20 MG/1; MG/1
1 CAPSULE ORAL DAILY
Qty: 30 CAPSULE | Refills: 5 | Status: SHIPPED | OUTPATIENT
Start: 2024-12-12

## 2025-01-02 DIAGNOSIS — I48.0 PAROXYSMAL ATRIAL FIBRILLATION (HCC): ICD-10-CM

## 2025-01-03 RX ORDER — METOPROLOL SUCCINATE 25 MG/1
TABLET, EXTENDED RELEASE ORAL
Qty: 30 TABLET | Refills: 5 | Status: SHIPPED | OUTPATIENT
Start: 2025-01-03

## 2025-01-07 ENCOUNTER — TELEPHONE (OUTPATIENT)
Age: OVER 89
End: 2025-01-07

## 2025-01-07 LAB
ALBUMIN SERPL-MCNC: 3.7 G/DL (ref 3.5–5.7)
ALP SERPL-CCNC: 69 U/L (ref 35–120)
ALT SERPL-CCNC: 17 U/L
ANION GAP SERPL CALCULATED.3IONS-SCNC: 5 MMOL/L (ref 3–11)
AST SERPL-CCNC: 22 U/L
BILIRUB SERPL-MCNC: 0.4 MG/DL (ref 0.2–1)
BUN SERPL-MCNC: 19 MG/DL (ref 7–25)
CALCIUM SERPL-MCNC: 9.5 MG/DL (ref 8.5–10.5)
CHLORIDE SERPL-SCNC: 103 MMOL/L (ref 100–109)
CO2 SERPL-SCNC: 27 MMOL/L (ref 21–31)
CREAT SERPL-MCNC: 0.9 MG/DL (ref 0.4–1.1)
CYTOLOGY CMNT CVX/VAG CYTO-IMP: ABNORMAL
GFR/BSA.PRED SERPLBLD CYS-BASED-ARV: 59 ML/MIN/{1.73_M2}
GLUCOSE SERPL-MCNC: 89 MG/DL (ref 65–99)
POTASSIUM SERPL-SCNC: 4.5 MMOL/L (ref 3.5–5.2)
PROT SERPL-MCNC: 6.4 G/DL (ref 6.3–8.3)
SODIUM SERPL-SCNC: 135 MMOL/L (ref 135–145)
TSH SERPL-ACNC: 4.13 UIU/ML (ref 0.45–5.33)

## 2025-01-07 NOTE — TELEPHONE ENCOUNTER
I'm not sure what they are referring to. Her GFR is stable, renal labs look stable over the past 2 years. Are they talking about the GFR of 59?

## 2025-01-07 NOTE — TELEPHONE ENCOUNTER
Just FYI- not sure why they called to report this so just passing along if there is additional information I'm missing.

## 2025-01-15 ENCOUNTER — NURSE TRIAGE (OUTPATIENT)
Age: OVER 89
End: 2025-01-15

## 2025-01-15 ENCOUNTER — TELEPHONE (OUTPATIENT)
Age: OVER 89
End: 2025-01-15

## 2025-01-15 NOTE — TELEPHONE ENCOUNTER
"Patient stated that starting today didn't feel well, experienced slight headache which comes and goes. Took a good nap symptoms subsided. Doesn't check BP everyday, only when not feeling well. Noticed today running high 164/91 and 167/95. No missed doses of Amlodipine, taken at lunch time. Did take half a Xanax in case it was related to anxiety. BP still elevated. Asking for further recommendation from pcp at this time. I did instruct if symptoms would worsen she should be evaluated at an UC/ED, patient verbalized understanding.    Reason for Disposition   Systolic BP >= 160 OR Diastolic >= 100    Answer Assessment - Initial Assessment Questions  1. BLOOD PRESSURE: \"What is your blood pressure?\" \"Did you take at least two measurements 5 minutes apart?\"      164/91 167/95  2. ONSET: \"When did you take your blood pressure?\"      Prior to phone call  3. HOW: \"How did you take your blood pressure?\" (e.g., automatic home BP monitor, visiting nurse)      At home BP monitor  4. HISTORY: \"Do you have a history of high blood pressure?\"      yes  5. MEDICINES: \"Are you taking any medicines for blood pressure?\" \"Have you missed any doses recently?\"      amLODIPine-benazepril (LOTREL 5-20) 5-20 MG per capsule (no missed doses)  6. OTHER SYMPTOMS: \"Do you have any symptoms?\" (e.g., blurred vision, chest pain, difficulty breathing, headache, weakness)      No symptoms besides not feeling well (did have slight headache comes and goes, dizziness, took a good nap and symptoms subsided)   7. PREGNANCY: \"Is there any chance you are pregnant?\" \"When was your last menstrual period?\"      N/A    Protocols used: Blood Pressure - High-Adult-OH    "

## 2025-01-15 NOTE — TELEPHONE ENCOUNTER
"Advised not to \"panic \"over these numbers they are not extremely out of range.  Patient should continue to take some blood pressure readings over the next 24 to 48 hours and report them to us.  It is okay to take the anxiety medicine if she feels she needs it.  "

## 2025-01-15 NOTE — TELEPHONE ENCOUNTER
Patient with high BP today of 167/91. She has a headache and slight dizziness. She took her xanax and her daily amlodipine. Warm transfer to nurse line.

## 2025-01-16 NOTE — TELEPHONE ENCOUNTER
Advised pt she will do so and she will try to keep calm. Pt feels better after hearing that from Dr Wheeler

## 2025-02-04 ENCOUNTER — OFFICE VISIT (OUTPATIENT)
Dept: CARDIOLOGY CLINIC | Facility: CLINIC | Age: OVER 89
End: 2025-02-04
Payer: COMMERCIAL

## 2025-02-04 VITALS
BODY MASS INDEX: 22.87 KG/M2 | OXYGEN SATURATION: 97 % | HEIGHT: 57 IN | WEIGHT: 106 LBS | SYSTOLIC BLOOD PRESSURE: 134 MMHG | HEART RATE: 84 BPM | DIASTOLIC BLOOD PRESSURE: 68 MMHG

## 2025-02-04 DIAGNOSIS — I10 BENIGN ESSENTIAL HTN: Primary | ICD-10-CM

## 2025-02-04 DIAGNOSIS — I48.21 PERMANENT ATRIAL FIBRILLATION (HCC): ICD-10-CM

## 2025-02-04 PROCEDURE — 99214 OFFICE O/P EST MOD 30 MIN: CPT | Performed by: INTERNAL MEDICINE

## 2025-02-04 NOTE — PROGRESS NOTES
"      Cardiology             Denise Garcia  3/28/1931  [id number]              Assessment/Plan:    Persistent atrial fibrillation  Hypertension  Sick sinus syndrome with history of junctional bradycardia high-dose metoprolol  Chronic diastolic CHF      Heart rate is well-controlled, continue metoprolol  Continue Eliquis anticoagulation, tolerating well with no abnormal bleeding or heavy bruising.  She denies any falls since her last visit.  Blood pressure well-controlled, continue amlodipine/benazepril        Follow-up in 1 year.  Patient encouraged to call if any changes      Interval History:     This is a 89-year-old female with persistent atrial fibrillation, hypertension, sick sinus syndrome with history of junctional bradycardia, and chronic diastolic CHF.  She has been following with Dr. Tavares in the past.    She was last seen 6/2022, at which time she was noted to have atrial fibrillation with borderline tachycardia.  She was asymptomatic on this.  Metoprolol is increased from 12.5 mg daily to 25 mg daily and Eliquis 2.5 mg twice daily was continued.  Amlodipine and benazepril continued for hypertension    She presents today for follow-up with no complaints.  Specifically, she denies any chest discomfort, dyspnea, lightheadedness, palpitations, lower extremity edema.          Vitals:  Vitals:    02/04/25 1124   BP: 134/68   Pulse: 84   SpO2: 97%   Weight: 48.1 kg (106 lb)   Height: 4' 9\" (1.448 m)         Past Medical History:   Diagnosis Date   • A-fib (HCC)    • Anxiety    • Depression    • Fainting    • Hypertension    • Sick sinus syndrome (HCC)      Social History     Socioeconomic History   • Marital status:      Spouse name: Not on file   • Number of children: 4   • Years of education: high school graduate    • Highest education level: Not on file   Occupational History   • Occupation: hosewife or homemaker    Tobacco Use   • Smoking status: Former   • Smokeless tobacco: Never "   Vaping Use   • Vaping status: Never Used   Substance and Sexual Activity   • Alcohol use: Yes   • Drug use: No   • Sexual activity: Not on file   Other Topics Concern   • Not on file   Social History Narrative    Lives with spouse      Social Drivers of Health     Financial Resource Strain: Low Risk  (9/27/2022)    Overall Financial Resource Strain (CARDIA)    • Difficulty of Paying Living Expenses: Not hard at all   Food Insecurity: No Food Insecurity (4/1/2024)    Nursing - Inadequate Food Risk Classification    • Worried About Running Out of Food in the Last Year: Never true    • Ran Out of Food in the Last Year: Never true    • Ran Out of Food in the Last Year: Not on file   Transportation Needs: No Transportation Needs (4/1/2024)    PRAPARE - Transportation    • Lack of Transportation (Medical): No    • Lack of Transportation (Non-Medical): No   Physical Activity: Not on file   Stress: Not on file   Social Connections: Not on file   Intimate Partner Violence: Not on file   Housing Stability: Low Risk  (4/1/2024)    Housing Stability Vital Sign    • Unable to Pay for Housing in the Last Year: No    • Number of Times Moved in the Last Year: 1    • Homeless in the Last Year: No      Family History   Problem Relation Age of Onset   • Atrial fibrillation Sister    • Other Father         cardiac pacemaker    • Pancreatic cancer Brother      No past surgical history on file.    Current Outpatient Medications:   •  amLODIPine-benazepril (LOTREL 5-20) 5-20 MG per capsule, 1 CAP BY MOUTH ONCE DAILY, Disp: 30 capsule, Rfl: 5  •  apixaban (Eliquis) 2.5 mg, Take 1 tablet (2.5 mg total) by mouth 2 (two) times a day, Disp: 60 tablet, Rfl: 4  •  levothyroxine 75 mcg tablet, 1 TAB BY MOUTH ONCE DAILY AS DIRECTED., Disp: 90 tablet, Rfl: 1  •  lidocaine (XYLOCAINE) 5 % ointment, Apply topically 3 (three) times a day as needed for mild pain, Disp: 50 g, Rfl: 0  •  metoprolol succinate (TOPROL-XL) 25 mg 24 hr tablet, TAKE 1  TABLET BY MOUTH DAILY **SWALLOW WHOLE-DO NOT CHEW OR CRUSH*, Disp: 30 tablet, Rfl: 5        Review of Systems:  Review of Systems   Constitutional:  Negative for activity change, fever and unexpected weight change.   HENT:  Negative for facial swelling, nosebleeds and voice change.    Respiratory:  Negative for cough, chest tightness, shortness of breath and wheezing.    Cardiovascular:  Negative for chest pain, palpitations and leg swelling.   Gastrointestinal:  Negative for abdominal distention.   Genitourinary:  Negative for hematuria.   Musculoskeletal:  Negative for arthralgias.   Skin:  Negative for color change, pallor, rash and wound.   Neurological:  Negative for dizziness, seizures, syncope and light-headedness.   Psychiatric/Behavioral:  Negative for agitation.          Physical Exam:  Physical Exam  Vitals reviewed.   Constitutional:       Appearance: She is well-developed.   HENT:      Head: Normocephalic and atraumatic.   Cardiovascular:      Rate and Rhythm: Normal rate. Rhythm irregular.      Heart sounds: Normal heart sounds. No murmur heard.  Pulmonary:      Effort: Pulmonary effort is normal.      Breath sounds: Normal breath sounds.   Abdominal:      Palpations: Abdomen is soft.   Musculoskeletal:         General: Normal range of motion.      Cervical back: Normal range of motion and neck supple.      Right lower leg: No edema.      Left lower leg: No edema.   Skin:     General: Skin is warm and dry.   Neurological:      Mental Status: She is alert and oriented to person, place, and time.   Psychiatric:         Behavior: Behavior normal.         Thought Content: Thought content normal.         Judgment: Judgment normal.         This note was completed in part utilizing M-Modal Fluency Direct Software.  Grammatical errors, random word insertions, spelling mistakes, and incomplete sentences can be an occasional consequence of this system secondary to software limitations, ambient noise, and  hardware issues.  If you have any questions or concerns about the content, text, or information contained within the body of this dictation, please contact the provider for clarification.

## 2025-02-25 DIAGNOSIS — E03.9 ACQUIRED HYPOTHYROIDISM: ICD-10-CM

## 2025-02-26 RX ORDER — LEVOTHYROXINE SODIUM 75 UG/1
TABLET ORAL
Qty: 90 TABLET | Refills: 1 | Status: SHIPPED | OUTPATIENT
Start: 2025-02-26

## 2025-03-10 DIAGNOSIS — I48.0 PAROXYSMAL ATRIAL FIBRILLATION (HCC): ICD-10-CM

## 2025-03-11 RX ORDER — APIXABAN 2.5 MG/1
TABLET, FILM COATED ORAL
Qty: 60 TABLET | Refills: 5 | Status: SHIPPED | OUTPATIENT
Start: 2025-03-11

## 2025-03-17 DIAGNOSIS — M48.061 SPINAL STENOSIS OF LUMBAR REGION WITHOUT NEUROGENIC CLAUDICATION: Primary | ICD-10-CM

## 2025-03-17 RX ORDER — LIDOCAINE 50 MG/G
1 PATCH TOPICAL DAILY
Qty: 30 PATCH | Refills: 3 | Status: SHIPPED | OUTPATIENT
Start: 2025-03-17

## 2025-04-14 ENCOUNTER — RA CDI HCC (OUTPATIENT)
Dept: OTHER | Facility: HOSPITAL | Age: OVER 89
End: 2025-04-14

## 2025-04-21 ENCOUNTER — OFFICE VISIT (OUTPATIENT)
Dept: FAMILY MEDICINE CLINIC | Facility: CLINIC | Age: OVER 89
End: 2025-04-21
Payer: COMMERCIAL

## 2025-04-21 VITALS
SYSTOLIC BLOOD PRESSURE: 132 MMHG | WEIGHT: 107.4 LBS | DIASTOLIC BLOOD PRESSURE: 70 MMHG | HEART RATE: 89 BPM | HEIGHT: 57 IN | OXYGEN SATURATION: 99 % | TEMPERATURE: 98.1 F | BODY MASS INDEX: 23.17 KG/M2

## 2025-04-21 DIAGNOSIS — I48.21 PERMANENT ATRIAL FIBRILLATION (HCC): ICD-10-CM

## 2025-04-21 DIAGNOSIS — F41.9 ANXIETY: ICD-10-CM

## 2025-04-21 DIAGNOSIS — M48.061 SPINAL STENOSIS OF LUMBAR REGION WITHOUT NEUROGENIC CLAUDICATION: ICD-10-CM

## 2025-04-21 DIAGNOSIS — E03.9 ACQUIRED HYPOTHYROIDISM: ICD-10-CM

## 2025-04-21 DIAGNOSIS — Z00.00 MEDICARE ANNUAL WELLNESS VISIT, SUBSEQUENT: Primary | ICD-10-CM

## 2025-04-21 DIAGNOSIS — N18.31 STAGE 3A CHRONIC KIDNEY DISEASE (HCC): ICD-10-CM

## 2025-04-21 DIAGNOSIS — I10 ESSENTIAL HYPERTENSION: ICD-10-CM

## 2025-04-21 DIAGNOSIS — L71.0 PERIORAL DERMATITIS: ICD-10-CM

## 2025-04-21 PROCEDURE — G2211 COMPLEX E/M VISIT ADD ON: HCPCS | Performed by: FAMILY MEDICINE

## 2025-04-21 PROCEDURE — G0439 PPPS, SUBSEQ VISIT: HCPCS | Performed by: FAMILY MEDICINE

## 2025-04-21 PROCEDURE — 99213 OFFICE O/P EST LOW 20 MIN: CPT | Performed by: FAMILY MEDICINE

## 2025-04-21 RX ORDER — ALPRAZOLAM 0.25 MG
0.12 TABLET ORAL 2 TIMES DAILY PRN
Qty: 30 TABLET | Refills: 0 | Status: SHIPPED | OUTPATIENT
Start: 2025-04-21

## 2025-04-21 RX ORDER — CLOTRIMAZOLE 1 %
CREAM (GRAM) TOPICAL 2 TIMES DAILY
Qty: 30 G | Refills: 0 | Status: SHIPPED | OUTPATIENT
Start: 2025-04-21

## 2025-04-21 NOTE — ASSESSMENT & PLAN NOTE
Uses very rarely  Orders:  •  ALPRAZolam (XANAX) 0.25 mg tablet; Take 0.5 tablets (0.125 mg total) by mouth 2 (two) times a day as needed for anxiety

## 2025-04-21 NOTE — PROGRESS NOTES
Name: Denise Garcia      : 3/28/1931      MRN: [id number]  Encounter Provider: Rosie Vasquez DO  Encounter Date: 2025   Encounter department: [facility] PRIMARY CARE  :  Assessment & Plan  Medicare annual wellness visit, subsequent  Return in about 6 months (around 10/21/2025) for Recheck with Jessica Fields, nurse practitioner.        Acquired hypothyroidism  Patient's lab work currently euthyroid.  Repeat yearly unless patient is symptomatic       Essential hypertension  ATgoal continue current medical regimen       Spinal stenosis of lumbar region without neurogenic claudication  Patient prefers to use more natural approach.  Trial of additional topical NSAID  Orders:  •  Diclofenac Sodium (VOLTAREN) 1 %; Apply 2 g topically 4 (four) times a day    Anxiety  Uses very rarely  Orders:  •  ALPRAZolam (XANAX) 0.25 mg tablet; Take 0.5 tablets (0.125 mg total) by mouth 2 (two) times a day as needed for anxiety    Perioral dermatitis  Reviewed cause and treatment of perioral dermatitis  Orders:  •  clotrimazole (LOTRIMIN) 1 % cream; Apply topically 2 (two) times a day To corners of mouth    Permanent atrial fibrillation (HCC)  Patient currently on Eliquis for stroke prevention and metoprolol for rate control.  Patient remains asymptomatic       Stage 3a chronic kidney disease (HCC)  Lab Results   Component Value Date    EGFR 59 (L) 2025    EGFR 51 2024    EGFR 48 2022    CREATININE 0.90 2025    CREATININE 0.95 2024    CREATININE 1.01 2022   GFR stable           Depression Screening and Follow-up Plan: Patient was screened for depression during today's encounter. They screened negative with a PHQ-9 score of 0.        Preventive health issues were discussed with patient, and age appropriate screening tests were ordered as noted in patient's After Visit Summary. Personalized health advice and appropriate referrals for health education or preventive  services given if needed, as noted in patient's After Visit Summary.    History of Present Illness   Chief Complaint   Patient presents with   • Annual Exam     Medicare Annual Wellness    C/O corners of her mouth has little red bumps, doesn't hurt.      Patient is pleasant 89-year-old female who appears much younger than her stated age here for Medicare wellness and review.    She is up-to-date with Dr. Ball cardiology once yearly.  Patient Care Team:  Rosie Vasquez DO as PCP - General  Kimmy Ball DO (Cardiology)  Component      Latest Ref Rng 1/7/2025   GLUCOSE      65 - 99 mg/dL 89    BUN      7 - 25 mg/dL 19    Creatinine      0.40 - 1.10 mg/dL 0.90    Sodium      135 - 145 mmol/L 135    Potassium      3.5 - 5.2 mmol/L 4.5    Chloride      100 - 109 mmol/L 103    Carbon Dioxide      21 - 31 mmol/L 27    Calcium      8.5 - 10.5 mg/dL 9.5    ALK PHOS      35 - 120 U/L 69    Albumin      3.5 - 5.7 g/dL 3.7    Total Bilirubin      0.2 - 1.0 mg/dL 0.4    Total Protein      6.3 - 8.3 g/dL 6.4    AST      <41 U/L 22    ALT      <56 U/L 17    ANION GAP      3 - 11  5    GFR, Calculated      >59  59 (L)    Comment (Note)    TSH, POC      0.45 - 5.33 uIU/mL 4.13         Review of Systems   Constitutional:         Doing well   HENT:  Negative for mouth sores (Has little red bumps at the corners of her mouth, they do not hurt).    Respiratory:  Negative for shortness of breath.    Cardiovascular:  Negative for chest pain.   Musculoskeletal:  Positive for back pain (Tolerable, uses all-natural products).   Patient is compliant with all her medications for hypertension, thyroid, Eliquis for A-fib stroke prevention  :  Tobacco  Allergies  Meds  Problems  Med Hx  Surg Hx  Fam Hx       Annual Wellness Visit Questionnaire   Denise is here for her Subsequent Wellness visit.     Health Risk Assessment:   Patient rates overall health as good. Patient feels that their physical health rating is same. Patient is very  satisfied with their life. Eyesight was rated as same. Hearing was rated as same. Patient feels that their emotional and mental health rating is same. Patients states they are never, rarely angry. Pain experienced in the last 7 days has been none. Patient states that she has experienced no weight loss or gain in last 6 months.     Depression Screening:   PHQ-9 Score: 0      Fall Risk Screening:   In the past year, patient has experienced: no history of falling in past year      Urinary Incontinence Screening:   Patient has not leaked urine accidently in the last six months.     Home Safety:  Patient does not have trouble with stairs inside or outside of their home. Patient has working smoke alarms and has working carbon monoxide detector. Home safety hazards include: none.     Medications:   Patient is currently taking over-the-counter supplements. OTC medications include: see medication list. Patient is able to manage medications.     Activities of Daily Living (ADLs)/Instrumental Activities of Daily Living (IADLs):   Walk and transfer into and out of bed and chair?: Yes  Dress and groom yourself?: Yes    Bathe or shower yourself?: Yes    Feed yourself? Yes  Do your laundry/housekeeping?: Yes  Manage your money, pay your bills and track your expenses?: Yes  Make your own meals?: Yes    Do your own shopping?: Yes    Previous Hospitalizations:   Any hospitalizations or ED visits within the last 12 months?: No      Advance Care Planning:   Living will: Yes    Durable POA for healthcare: Yes    Advanced directive: Yes    Advanced directive counseling given: Yes    End of Life Decisions reviewed with patient: Yes    Provider agrees with end of life decisions: Yes      Cognitive Screening:   Provider or family/friend/caregiver concerned regarding cognition?: No    Preventive Screenings      Cardiovascular Screening:    General: Screening Not Indicated and History Lipid Disorder      Diabetes Screening:     General:  "Screening Current      Colorectal Cancer Screening:     General: Screening Not Indicated      Breast Cancer Screening:     General: Patient Declines      Cervical Cancer Screening:    General: Screening Not Indicated      Osteoporosis Screening:    General: Patient Declines      Abdominal Aortic Aneurysm (AAA) Screening:        General: Screening Not Indicated      Lung Cancer Screening:     General: Screening Not Indicated      Hepatitis C Screening:    General: Screening Not Indicated    Hep C Screening Accepted: No     Immunizations:  - Immunizations due: Zoster (Shingrix)    Screening, Brief Intervention, and Referral to Treatment (SBIRT)     Screening  Typical number of drinks in a day: 1  Typical number of drinks in a week: 7  Interpretation: Low risk drinking behavior.    Social Drivers of Health     Financial Resource Strain: Low Risk  (9/27/2022)    Overall Financial Resource Strain (CARDIA)    • Difficulty of Paying Living Expenses: Not hard at all   Food Insecurity: No Food Insecurity (4/21/2025)    Nursing - Inadequate Food Risk Classification    • Worried About Running Out of Food in the Last Year: Never true    • Ran Out of Food in the Last Year: Never true   Transportation Needs: No Transportation Needs (4/21/2025)    PRAPARE - Transportation    • Lack of Transportation (Medical): No    • Lack of Transportation (Non-Medical): No   Housing Stability: Unknown (4/21/2025)    Housing Stability Vital Sign    • Unable to Pay for Housing in the Last Year: No    • Number of Times Moved in the Last Year: 0   Utilities: Not At Risk (4/21/2025)    [facility] Utilities    • Threatened with loss of utilities: No     No results found.    Objective   /70   Pulse 89   Temp 98.1 °F (36.7 °C) (Temporal)   Ht 4' 9\" (1.448 m)   Wt 48.7 kg (107 lb 6.4 oz)   SpO2 99%   BMI 23.24 kg/m²     Physical Exam  Vitals reviewed.   Constitutional:       Appearance: Normal appearance.      Comments: 89-year-old female who " appears younger than her stated age with normal BMI   HENT:      Mouth/Throat:      Mouth: Mucous membranes are moist.     Neck:      Vascular: No carotid bruit.     Cardiovascular:      Rate and Rhythm: Normal rate. Rhythm irregular.   Pulmonary:      Effort: Pulmonary effort is normal.      Breath sounds: Normal breath sounds.   Abdominal:      Palpations: Abdomen is soft.     Musculoskeletal:      Cervical back: Neck supple.      Right lower leg: No edema.      Left lower leg: No edema.      Comments: Gait steady with walker     Neurological:      Mental Status: She is alert.     Psychiatric:         Mood and Affect: Mood normal.         Behavior: Behavior normal.         Thought Content: Thought content normal.         Judgment: Judgment normal.

## 2025-04-21 NOTE — ASSESSMENT & PLAN NOTE
Patient prefers to use more natural approach.  Trial of additional topical NSAID  Orders:  •  Diclofenac Sodium (VOLTAREN) 1 %; Apply 2 g topically 4 (four) times a day

## 2025-04-23 ENCOUNTER — TELEPHONE (OUTPATIENT)
Age: OVER 89
End: 2025-04-23

## 2025-04-23 NOTE — TELEPHONE ENCOUNTER
Patient called and stated she received her prescription of the Volteran however patient stated on the box it states that the topical ointment is to not be used on her back. Patient states she asked for this ointment for her back pain specifically. Patient is asking if she received the correct medication at this time. I confirmed with patient that the medication received was correct by name/dosage. Patient is asking for pcp advise due to the box stating not to be used for back pain. Please advise and return patient call.

## 2025-05-18 NOTE — ASSESSMENT & PLAN NOTE
Lab Results   Component Value Date    EGFR 59 (L) 01/07/2025    EGFR 51 09/09/2024    EGFR 48 09/24/2022    CREATININE 0.90 01/07/2025    CREATININE 0.95 09/09/2024    CREATININE 1.01 09/24/2022   GFR stable

## 2025-05-18 NOTE — ASSESSMENT & PLAN NOTE
Patient currently on Eliquis for stroke prevention and metoprolol for rate control.  Patient remains asymptomatic

## 2025-05-19 DIAGNOSIS — I10 ESSENTIAL HYPERTENSION: ICD-10-CM

## 2025-05-19 NOTE — TELEPHONE ENCOUNTER
Patient called to request a refill for their amLODIPine (NORVASC) 2.5 mg tablet        Pharmacy : [facility] - [place] - [id number]      Does the patient have enough for 3 days?   [] Yes   [x] No - Send as HP to POD

## 2025-05-20 RX ORDER — AMLODIPINE AND BENAZEPRIL HYDROCHLORIDE 5; 20 MG/1; MG/1
1 CAPSULE ORAL DAILY
Qty: 30 CAPSULE | Refills: 5 | Status: SHIPPED | OUTPATIENT
Start: 2025-05-20

## 2025-08-04 ENCOUNTER — TELEPHONE (OUTPATIENT)
Age: OVER 89
End: 2025-08-04

## 2025-08-04 DIAGNOSIS — I48.0 PAROXYSMAL ATRIAL FIBRILLATION (HCC): ICD-10-CM

## 2025-08-05 RX ORDER — METOPROLOL SUCCINATE 25 MG/1
25 TABLET, EXTENDED RELEASE ORAL DAILY
Qty: 30 TABLET | Refills: 2 | Status: SHIPPED | OUTPATIENT
Start: 2025-08-05